# Patient Record
Sex: FEMALE | Race: WHITE | Employment: OTHER | URBAN - METROPOLITAN AREA
[De-identification: names, ages, dates, MRNs, and addresses within clinical notes are randomized per-mention and may not be internally consistent; named-entity substitution may affect disease eponyms.]

---

## 2018-08-01 ENCOUNTER — APPOINTMENT (OUTPATIENT)
Dept: GENERAL RADIOLOGY | Age: 83
End: 2018-08-01
Payer: MEDICARE

## 2018-08-01 ENCOUNTER — HOSPITAL ENCOUNTER (EMERGENCY)
Age: 83
Discharge: HOME | End: 2018-08-01
Payer: MEDICARE

## 2018-08-01 VITALS
RESPIRATION RATE: 14 BRPM | DIASTOLIC BLOOD PRESSURE: 81 MMHG | OXYGEN SATURATION: 97 % | WEIGHT: 220 LBS | HEART RATE: 81 BPM | TEMPERATURE: 98.3 F | SYSTOLIC BLOOD PRESSURE: 174 MMHG | HEIGHT: 62 IN | BODY MASS INDEX: 40.48 KG/M2

## 2018-08-01 DIAGNOSIS — M62.838 SPASM OF MUSCLE: Primary | ICD-10-CM

## 2018-08-01 DIAGNOSIS — M25.512 ARTHRALGIA OF LEFT ACROMIOCLAVICULAR JOINT: ICD-10-CM

## 2018-08-01 PROCEDURE — 73060 X-RAY EXAM OF HUMERUS: CPT

## 2018-08-01 PROCEDURE — 99283 EMERGENCY DEPT VISIT LOW MDM: CPT

## 2018-08-01 RX ORDER — LIDOCAINE 50 MG/G
1 PATCH TOPICAL DAILY
Qty: 30 PATCH | Refills: 0 | Status: SHIPPED | OUTPATIENT
Start: 2018-08-01

## 2018-08-01 ASSESSMENT — PAIN SCALES - GENERAL: PAINLEVEL_OUTOF10: 10

## 2018-08-01 ASSESSMENT — PAIN DESCRIPTION - LOCATION: LOCATION: ARM

## 2018-08-01 ASSESSMENT — PAIN DESCRIPTION - ORIENTATION: ORIENTATION: LEFT

## 2018-08-01 ASSESSMENT — PAIN DESCRIPTION - PAIN TYPE: TYPE: ACUTE PAIN

## 2018-08-01 NOTE — ED PROVIDER NOTES
[facility]      HPI:  8/1/18,   Time: 5:28 PM         Luna Willis is a 82 y.o. female presenting to the ED for  left upper arm pain for 3 days. Patient states that she slept on her arm  underneath of her and woke up with a lump in her upper arm. Patient states that it only hurts when she lays on her arm at night. She denies  any chest pain shortness of breath nausea vomiting jaw pain or neck pain. Patient states that she can feel a tender lump in her arm. The patient denies any direct trauma or injury. He shouldn't denies any numbness tingling or weakness to the bilateral upper extremities. ROS:   Pertinent positives and negatives are stated within HPI, all other systems reviewed and are negative.  --------------------------------------------- PAST HISTORY ---------------------------------------------  Past Medical History:  has a past medical history of Arthritis; CAD (coronary artery disease); Depression; Diabetes mellitus ([facility] [address] H/O cardiovascular stress test; History of kidney stones; Hx of blood clots; Hyperlipidemia; Hypertension; Myocardial infarct; Preoperative clearance; Reflux; Thyroid disease; and Vitamin D deficiency. Past Surgical History:  has a past surgical history that includes Hysterectomy; Parathyroid gland surgery (2720'D); other surgical history (Right, 5/23/2013); Cardiac catheterization (1997 and 1998); Total knee arthroplasty (Right, 2/19/15); and Knee Arthroplasty (Left, 07/19/2016). Social History:  reports that she quit smoking about 44 years ago. She has never used smokeless tobacco. She reports that she does not drink alcohol or use drugs. Family History: family history is not on file. The patients home medications have been reviewed. Allergies: Patient has no known allergies.     -------------------------------------------------- RESULTS -------------------------------------------------  All laboratory and radiology results have been personally reviewed by myself   LABS:  No results found for this visit on 08/01/18. RADIOLOGY:  Interpreted by Radiologist.  XR HUMERUS LEFT (MIN 2 VIEWS)   Final Result   Symptoms should be correlated with clinical findings regarding the   possibility of rotator cuff impingement, given narrowing of the   acromial-humeral interspace. No evidence of fracture or other significant acute pathology. MRI could better characterize rotator cuff anatomy and upper arm soft   tissues at further imaging is clinically warranted. ------------------------- NURSING NOTES AND VITALS REVIEWED ---------------------------   The nursing notes within the ED encounter and vital signs as below have been reviewed. BP (!) 174/81   Pulse 81   Temp 98.3 °F (36.8 °C) (Oral)   Resp 14   Ht 5' 2\" (1.575 m)   Wt 220 lb (99.8 kg)   SpO2 97%   BMI 40.24 kg/m²   Oxygen Saturation Interpretation: Normal      ---------------------------------------------------PHYSICAL EXAM--------------------------------------      Constitutional/General: Alert and oriented x3, well appearing, non toxic in NAD  Head: NC/AT  Eyes: PERRL, EOMI  Mouth: Oropharynx clear, handling secretions, no trismus  Neck: Supple, full ROM, no meningeal signs  Pulmonary: Lungs clear to auscultation bilaterally, no wheezes, rales, or rhonchi. Not in respiratory distress  Cardiovascular:  Regular rate and rhythm, no murmurs, gallops, or rubs. 2+ distal pulses  Abdomen: Soft, non tender, non distended,   Extremities: Moves all extremities x 4. Warm and well perfused. Muscle spasming felt to the left deltoid. Range of motion to the shoulder and elbow without pain. Positive impingement sign. Negative erythema ecchymosis or edema. Brachial radial and ulnar pulses are 2+ capillary refill is brisk. Skin is warm dry and intact.   Skin: warm and dry without rash  Neurologic: GCS 15,  Psych: Normal Affect      ------------------------------ ED COURSE/MEDICAL DECISION

## 2021-01-27 ENCOUNTER — IMMUNIZATION (OUTPATIENT)
Dept: PRIMARY CARE CLINIC | Age: 86
End: 2021-01-27
Payer: MEDICARE

## 2021-01-27 PROCEDURE — 0001A COVID-19, PFIZER VACCINE 30MCG/0.3ML DOSE: CPT | Performed by: PHYSICIAN ASSISTANT

## 2021-01-27 PROCEDURE — 91300 COVID-19, PFIZER VACCINE 30MCG/0.3ML DOSE: CPT | Performed by: PHYSICIAN ASSISTANT

## 2021-02-17 ENCOUNTER — IMMUNIZATION (OUTPATIENT)
Dept: PRIMARY CARE CLINIC | Age: 86
End: 2021-02-17
Payer: MEDICARE

## 2021-02-17 PROCEDURE — 0002A COVID-19, PFIZER VACCINE 30MCG/0.3ML DOSE: CPT | Performed by: NURSE PRACTITIONER

## 2021-02-17 PROCEDURE — 91300 COVID-19, PFIZER VACCINE 30MCG/0.3ML DOSE: CPT | Performed by: NURSE PRACTITIONER

## 2021-05-17 RX ORDER — LEVOTHYROXINE SODIUM 0.12 MG/1
125 TABLET ORAL DAILY
COMMUNITY

## 2021-05-17 RX ORDER — ASPIRIN 325 MG
325 TABLET ORAL DAILY
COMMUNITY

## 2021-05-17 RX ORDER — BLOOD SUGAR DIAGNOSTIC
STRIP MISCELLANEOUS
COMMUNITY
Start: 2021-02-16

## 2021-05-17 RX ORDER — OMEPRAZOLE 20 MG/1
20 CAPSULE, DELAYED RELEASE ORAL DAILY
COMMUNITY

## 2021-05-17 NOTE — PROGRESS NOTES
[facility] PRE-ADMISSION TESTING INSTRUCTIONS    The Preadmission Testing patient is instructed accordingly using the following criteria (check applicable):    ARRIVAL INSTRUCTIONS:  [x] Parking the day of Surgery is located in the Main Entrance lot. Upon entering the door, make an immediate right to the surgery reception desk    [x] Bring photo ID and insurance card    [x] Bring in a copy of Living will or Durable Power of  papers. [x] Please be sure to arrange for responsible adult to provide transportation to and from the hospital    [x] Please arrange for responsible adult to be with you for the 24 hour period post procedure due to having anesthesia      GENERAL INSTRUCTIONS:    [x] Nothing by mouth after midnight, including gum, candy, mints or water    [x] You may brush your teeth, but do not swallow any water    [x] Take medications as instructed with 1-2 oz of water    [x] Stop herbal supplements and vitamins 5 days prior to procedure    [x] Follow preop dosing of blood thinners per physician instructions    [] Take 1/2 dose of evening insulin, but no insulin after midnight    [] No oral diabetic medications after midnight    [] If diabetic and have low blood sugar or feel symptomatic, take 1-2oz apple juice only    [] Bring inhalers day of surgery    [] Bring C-PAP/ Bi-Pap day of surgery    [] Bring urine specimen day of surgery    [x] Shower or bath with soap, lather and rinse well, AM of Surgery, no lotion, powders or creams to surgical site    [] Follow bowel prep as instructed per surgeon    [x] No tobacco products within 24 hours of surgery     [x] No alcohol or illegal drug use within 24 hours of surgery.     [x] Jewelry, body piercing's, eyeglasses, contact lenses and dentures are not permitted into surgery (bring cases)      [x] Please do not wear any nail polish, make up or hair products on the day of surgery    [x] You can expect a call the business day prior to procedure to notify you if your arrival time changes    [x] If you receive a survey after surgery we would greatly appreciate your comments    [] Parent/guardian of a minor must accompany their child and remain on the premises  the entire time they are under our care     [] Pediatric patients may bring favorite toy, blanket or comfort item with them    [] A caregiver or family member must remain with the patient during their stay if they are mentally handicapped, have dementia, disoriented or unable to use a call light or would be a safety concern if left unattended    [x] Please notify surgeon if you develop any illness between now and time of surgery (cold, cough, sore throat, fever, nausea, vomiting) or any signs of infections  including skin, wounds, and dental.    [x]  The Outpatient Pharmacy is available to fill your prescription here on your day of surgery, ask your preop nurse for details    [] Other instructions    EDUCATIONAL MATERIALS PROVIDED:    [] PAT Preoperative Education Packet/Booklet     [] Medication List    [] Transfusion bracelet applied with instructions    [] Shower with soap, lather and rinse well, and use CHG wipes provided the evening before surgery as instructed    [] Incentive spirometer with instructions

## 2021-05-19 ENCOUNTER — PREP FOR PROCEDURE (OUTPATIENT)
Dept: SURGERY | Age: 86
End: 2021-05-19

## 2021-05-20 ENCOUNTER — PREP FOR PROCEDURE (OUTPATIENT)
Dept: SURGERY | Age: 86
End: 2021-05-20

## 2021-05-20 ENCOUNTER — ANESTHESIA EVENT (OUTPATIENT)
Dept: OPERATING ROOM | Age: 86
End: 2021-05-20
Payer: MEDICARE

## 2021-05-20 ENCOUNTER — HOSPITAL ENCOUNTER (OUTPATIENT)
Age: 86
Setting detail: OUTPATIENT SURGERY
Discharge: HOME | End: 2021-05-20
Attending: ORTHOPAEDIC SURGERY | Admitting: ORTHOPAEDIC SURGERY
Payer: MEDICARE

## 2021-05-20 ENCOUNTER — ANESTHESIA (OUTPATIENT)
Dept: OPERATING ROOM | Age: 86
End: 2021-05-20
Payer: MEDICARE

## 2021-05-20 ENCOUNTER — APPOINTMENT (OUTPATIENT)
Dept: GENERAL RADIOLOGY | Age: 86
End: 2021-05-20
Attending: ORTHOPAEDIC SURGERY
Payer: MEDICARE

## 2021-05-20 VITALS
DIASTOLIC BLOOD PRESSURE: 52 MMHG | TEMPERATURE: 96.8 F | HEART RATE: 72 BPM | OXYGEN SATURATION: 96 % | WEIGHT: 210 LBS | SYSTOLIC BLOOD PRESSURE: 118 MMHG | BODY MASS INDEX: 38.64 KG/M2 | RESPIRATION RATE: 18 BRPM | HEIGHT: 62 IN

## 2021-05-20 VITALS — DIASTOLIC BLOOD PRESSURE: 62 MMHG | SYSTOLIC BLOOD PRESSURE: 126 MMHG | OXYGEN SATURATION: 100 %

## 2021-05-20 LAB — METER GLUCOSE: 170 MG/DL (ref 74–99)

## 2021-05-20 PROCEDURE — 3600000002 HC SURGERY LEVEL 2 BASE: Performed by: ORTHOPAEDIC SURGERY

## 2021-05-20 PROCEDURE — 7100000010 HC PHASE II RECOVERY - FIRST 15 MIN: Performed by: ORTHOPAEDIC SURGERY

## 2021-05-20 PROCEDURE — 6360000004 HC RX CONTRAST MEDICATION: Performed by: ORTHOPAEDIC SURGERY

## 2021-05-20 PROCEDURE — 3700000001 HC ADD 15 MINUTES (ANESTHESIA): Performed by: ORTHOPAEDIC SURGERY

## 2021-05-20 PROCEDURE — 82962 GLUCOSE BLOOD TEST: CPT

## 2021-05-20 PROCEDURE — 7100000011 HC PHASE II RECOVERY - ADDTL 15 MIN: Performed by: ORTHOPAEDIC SURGERY

## 2021-05-20 PROCEDURE — 2500000003 HC RX 250 WO HCPCS: Performed by: ORTHOPAEDIC SURGERY

## 2021-05-20 PROCEDURE — 2580000003 HC RX 258: Performed by: NURSE ANESTHETIST, CERTIFIED REGISTERED

## 2021-05-20 PROCEDURE — 3700000000 HC ANESTHESIA ATTENDED CARE: Performed by: ORTHOPAEDIC SURGERY

## 2021-05-20 PROCEDURE — 3600000012 HC SURGERY LEVEL 2 ADDTL 15MIN: Performed by: ORTHOPAEDIC SURGERY

## 2021-05-20 PROCEDURE — 3209999900 FLUORO FOR SURGICAL PROCEDURES

## 2021-05-20 PROCEDURE — 6360000002 HC RX W HCPCS: Performed by: ORTHOPAEDIC SURGERY

## 2021-05-20 PROCEDURE — 6360000002 HC RX W HCPCS: Performed by: NURSE ANESTHETIST, CERTIFIED REGISTERED

## 2021-05-20 PROCEDURE — 2709999900 HC NON-CHARGEABLE SUPPLY: Performed by: ORTHOPAEDIC SURGERY

## 2021-05-20 RX ORDER — SODIUM CHLORIDE 0.9 % (FLUSH) 0.9 %
5-40 SYRINGE (ML) INJECTION PRN
Status: DISCONTINUED | OUTPATIENT
Start: 2021-05-20 | End: 2021-05-20 | Stop reason: HOSPADM

## 2021-05-20 RX ORDER — LIDOCAINE HYDROCHLORIDE 10 MG/ML
INJECTION, SOLUTION EPIDURAL; INFILTRATION; INTRACAUDAL; PERINEURAL PRN
Status: DISCONTINUED | OUTPATIENT
Start: 2021-05-20 | End: 2021-05-20 | Stop reason: ALTCHOICE

## 2021-05-20 RX ORDER — SODIUM CHLORIDE 0.9 % (FLUSH) 0.9 %
5-40 SYRINGE (ML) INJECTION EVERY 12 HOURS SCHEDULED
Status: CANCELLED | OUTPATIENT
Start: 2021-05-20

## 2021-05-20 RX ORDER — SODIUM CHLORIDE 0.9 % (FLUSH) 0.9 %
5-40 SYRINGE (ML) INJECTION EVERY 12 HOURS SCHEDULED
Status: DISCONTINUED | OUTPATIENT
Start: 2021-05-20 | End: 2021-05-20 | Stop reason: HOSPADM

## 2021-05-20 RX ORDER — SODIUM CHLORIDE 9 MG/ML
INJECTION, SOLUTION INTRAVENOUS CONTINUOUS PRN
Status: DISCONTINUED | OUTPATIENT
Start: 2021-05-20 | End: 2021-05-20 | Stop reason: SDUPTHER

## 2021-05-20 RX ORDER — SODIUM CHLORIDE 0.9 % (FLUSH) 0.9 %
5-40 SYRINGE (ML) INJECTION PRN
Status: CANCELLED | OUTPATIENT
Start: 2021-05-20

## 2021-05-20 RX ORDER — SODIUM CHLORIDE 9 MG/ML
25 INJECTION, SOLUTION INTRAVENOUS PRN
Status: DISCONTINUED | OUTPATIENT
Start: 2021-05-20 | End: 2021-05-20 | Stop reason: HOSPADM

## 2021-05-20 RX ORDER — SODIUM CHLORIDE 9 MG/ML
25 INJECTION, SOLUTION INTRAVENOUS PRN
Status: CANCELLED | OUTPATIENT
Start: 2021-05-20

## 2021-05-20 RX ORDER — PROPOFOL 10 MG/ML
INJECTION, EMULSION INTRAVENOUS CONTINUOUS PRN
Status: DISCONTINUED | OUTPATIENT
Start: 2021-05-20 | End: 2021-05-20 | Stop reason: SDUPTHER

## 2021-05-20 RX ADMIN — PROPOFOL 100 MCG/KG/MIN: 10 INJECTION, EMULSION INTRAVENOUS at 08:05

## 2021-05-20 RX ADMIN — SODIUM CHLORIDE: 9 INJECTION, SOLUTION INTRAVENOUS at 08:01

## 2021-05-20 ASSESSMENT — PAIN DESCRIPTION - DESCRIPTORS: DESCRIPTORS: ACHING

## 2021-05-20 ASSESSMENT — PAIN - FUNCTIONAL ASSESSMENT: PAIN_FUNCTIONAL_ASSESSMENT: 0-10

## 2021-05-20 ASSESSMENT — PULMONARY FUNCTION TESTS
PIF_VALUE: 0

## 2021-05-20 ASSESSMENT — PAIN SCALES - GENERAL: PAINLEVEL_OUTOF10: 0

## 2021-05-20 NOTE — OP NOTE
[address] [address]                                OPERATIVE REPORT    PATIENT NAME: Lelo Trejo                  :        1935  MED REC NO:   [id number]                            ROOM:  ACCOUNT NO:   [de-identified]                           ADMIT DATE: 2021  PROVIDER:     Walter Sherwood MD    DATE OF PROCEDURE:  2021    PREOPERATIVE DIAGNOSES:  Left hip pain with osteoarthritis. POSTOPERATIVE DIAGNOSES:  Left hip pain with osteoarthritis. PROCEDURES PERFORMED:  Left hip intra-articular steroid injection with  arthrogram using fluoroscopic guidance for needle placement. SURGEON:  PASCUAL [place]:  None. ANESTHESIA:  MAC.    ESTIMATED BLOOD LOSS:  Zero. COMPLICATIONS:  None. CONDITION:  Stable to Recovery. INDICATIONS:  The patient is an 45-year-old woman, who has developed  pain in the left hip and thigh with radiation to the knee. Radiographs  consistent with degenerative joint disease of the left hip. Intra-articular steroid injection was offered for both diagnostic and  therapeutic purpose following explanation of the risks, benefits,  alternatives, and limitations of the procedure. Informed consent  obtained. DESCRIPTION OF PROCEDURE:  The patient met in the preoperative holding  area. Left hip was marked as the correct operative site, taken to the  operating room and placed supine. Intravenous sedation was administered  by Anesthesia Department. The left distal flank, groin, and thigh were  prepped with ChloraPrep. Sterile field was demarcated with towels. Following a surgical timeout, C-arm fluoroscopy identified the left hip  joint, which showed evidence of osteoarthritis, but no bone-on-bone  contact. The skin was infiltrated with 5 mL of 1% lidocaine.   I  advanced a 3.5-inch 22-gauge spinal needle down to the femoral neck and  1 mL of contrast was delivered for the arthrogram, confirming  intra-articular placement of the needle and the hip was then injected  with 80 mg of Kenalog plus Marcaine and lidocaine. The needle was  withdrawn. There was no bleeding. Puncture site was covered with a  Band-Aid and sedation reversed. The patient tolerated the procedure  well. She was transported from operating table onto her hospital bed  and taken to Recovery in stable condition.         Clare Perkins MD    D: 05/20/2021 8:31:41       T: 05/20/2021 8:40:17     [email or web address]  Job#: [id number]     Doc#: [id number]    CC:

## 2021-05-20 NOTE — BRIEF OP NOTE
Brief Postoperative Note      Patient: Boris Almazan  YOB: 1935  MRN: [id number]    Date of Procedure: 5/20/2021    Pre-Op Diagnosis: Left hip pain with osteoarthritis    Post-Op Diagnosis: Same       Procedure(s):  LEFT HIP INJECTION ARTHROGRAM    Surgeon(s):  Oscar Garcia MD    Assistant:  * No surgical staff found *    Anesthesia: Monitor Anesthesia Care    Estimated Blood Loss (mL): 0    Complications: None    Specimens:   * No specimens in log *    Implants:  * No implants in log *      Drains: * No LDAs found *    Findings:  Moderate DJD    Electronically signed by Oscar Garcia MD on 5/20/2021 at 8:24 AM

## 2021-05-20 NOTE — ANESTHESIA PRE PROCEDURE
Department of Anesthesiology  Preprocedure Note       Name:  Doc Sheridan   Age:  82 y.o.  :  1935                                          MRN:  [id number]         Date:  2021      Surgeon: Chrissy Gould):  Jimbo Thomas MD    Procedure: Procedure(s):  LEFT HIP INJECTION ARTHROGRAM    Medications prior to admission:   Prior to Admission medications    Medication Sig Start Date End Date Taking? Authorizing Provider   levothyroxine (SYNTHROID) 125 MCG tablet Take 125 mcg by mouth Daily   Yes Historical Provider, MD   aspirin 325 MG tablet Take 325 mg by mouth daily Last taken 05/15/21   Yes Historical Provider, MD   omeprazole (PRILOSEC) 20 MG delayed release capsule Take 20 mg by mouth daily Take am dos 2021   Yes Historical Provider, MD   lidocaine (LIDODERM) 5 % Place 1 patch onto the skin daily 12 hours on, 12 hours off. 18  Yes ION Baldwin CNP   cholestyramine light 4 G packet Take 4 g by mouth daily    Yes Historical Provider, MD   isosorbide mononitrate (IMDUR) 30 MG CR tablet Take 30 mg by mouth daily Instructed to take with sip water am of procedure, 2021   Yes Historical Provider, MD   buPROPion SR (WELLBUTRIN SR) 150 MG SR tablet Take 150 mg by mouth 2 times daily Instructed to take with sip water am of procedure, 21   Yes Historical Provider, MD   lisinopril (PRINIVIL;ZESTRIL) 10 MG tablet Take 10 mg by mouth daily. Yes Historical Provider, MD   Metoprolol Tartrate (LOPRESSOR PO) Take 50 mg by mouth 2 times daily Instructed to take with sip water am of procedure. 2021   Yes Historical Provider, MD   Potassium Citrate (UROCIT-K 10 PO) Take 2 tablets by mouth 3 times daily    Yes Historical Provider, MD   allopurinol (ZYLOPRIM) 300 MG tablet Take 300 mg by mouth daily.  Taking for kidney stones   Yes Historical Provider, MD   Cholecalciferol (VITAMIN D3) 2000 UNITS CAPS Take 2,000 Units by mouth daily Last dose 21   Yes Historical Provider, MD multivitamin-iron-minerals-folic acid (CENTRUM) chewable tablet Take 1 tablet by mouth daily Last dose 05/17/21   Yes Historical Provider, MD   [facility]  2/16/21   Historical Provider, MD       Current medications:    No current facility-administered medications for this encounter.        Allergies:  No Known Allergies    Problem List:    Patient Active Problem List   Diagnosis Code    Primary osteoarthritis of right hip M16.11    Gout M10.9    Depression F32.9    Hyperlipidemia E78.5    HBP (high blood pressure) I10    Hypothyroid E03.9    H/O total hip arthroplasty (11-21-13: [facility])(Esequiel Lomeli MD) F58.301    CAD (coronary artery disease) I25.10    Primary osteoarthritis of right knee M17.11    S/P R total knee arthroplasty (2-19-15: Dr. Constantino Braswell) G02.074    Primary osteoarthritis of left knee M17.12    S/P L total knee arthroplasty (7-19-16: Arvind Alcocer M.D.) I69.575    Diabetes mellitus [facility]) E11.9       Past Medical History:        Diagnosis Date    Arthritis     osteo    CAD (coronary artery disease) 1997    Depression     Diabetes mellitus ([facility] [address]     borderline     H/O cardiovascular stress test 06/29/2016    report on chart    Heartburn     History of kidney stones     Hx of blood clots 1973    h/o    Hyperlipidemia     Hypertension     Left hip pain 05/2021    Myocardial infarct [facility]) 1997    3 mild episodes    Preoperative clearance 07/06/2016    Medical clearance from Dr. Brigido Whyte on paper chart (for OR 7-19-16, left total knee)    Thyroid disease     Use of cane as ambulatory aid     Vitamin D deficiency        Past Surgical History:        Procedure Laterality Date    CARDIAC CATHETERIZATION  1997 and [address] Bilateral     HYSTERECTOMY      JOINT REPLACEMENT      KNEE ARTHROPLASTY Left 07/19/2016    OTHER SURGICAL HISTORY Right 5/23/2013    hip arthrogram    PARATHYROID GLAND SURGERY  1990's    TOTAL KNEE ARTHROPLASTY Right 2/19/15    RIGHT TOTAL KNEE ARTHROPLASTY        Social History:    Social History     Tobacco Use    Smoking status: Former Smoker     Quit date: 1974     Years since quittin.0    Smokeless tobacco: Never Used   Substance Use Topics    Alcohol use: Yes     Comment: very rare                                Counseling given: Not Answered      Vital Signs (Current):   Vitals:    21 1421 21 0620   BP:  (!) 160/71   Pulse:  74   Resp:  18   Temp:  97 °F (36.1 °C)   TempSrc:  Temporal   SpO2:  95%   Weight: 210 lb (95.3 kg) 210 lb (95.3 kg)   Height: 5' 2\" (1.575 m) 5' 2\" (1.575 m)                                              BP Readings from Last 3 Encounters:   21 (!) 160/71   18 (!) 174/81   16 93/50       NPO Status: Time of last liquid consumption: 2200                        Time of last solid consumption: 1800                        Date of last liquid consumption: 21                        Date of last solid food consumption: 21    BMI:   Wt Readings from Last 3 Encounters:   21 210 lb (95.3 kg)   18 220 lb (99.8 kg)   16 223 lb (101.2 kg)     Body mass index is 38.41 kg/m². CBC:   Lab Results   Component Value Date    WBC 6.8 2016    RBC 4.11 2016    HGB 11.3 2016    HCT 34.6 2016    MCV 96.1 2016    RDW 15.0 2016     2016       CMP:   Lab Results   Component Value Date     2016    K 5.2 2016     2016    CO2 21 2016    BUN 27 2016    CREATININE 1.0 2016    GFRAA >60 2016    LABGLOM 53 2016    GLUCOSE 173 2016    GLUCOSE 122 2012    PROT 6.7 11/15/2013    CALCIUM 8.7 2016    BILITOT 0.7 11/15/2013    ALKPHOS 63 11/15/2013    AST 20 11/15/2013    ALT 22 11/15/2013       POC Tests: No results for input(s): POCGLU, POCNA, POCK, POCCL, POCBUN, POCHEMO, POCHCT in the last 72 hours.     Coags:   Lab Results   Component Value Date    PROTIME 17.4 11/24/2013    INR 2.1 11/24/2013       HCG (If Applicable): No results found for: PREGTESTUR, PREGSERUM, HCG, HCGQUANT     ABGs: No results found for: PHART, PO2ART, PNB3ULW, LHX4SJZ, BEART, E0JRZTRJ     Type & Screen (If Applicable):  No results found for: LABABO, LABRH    Drug/Infectious Status (If Applicable):  No results found for: HIV, HEPCAB    COVID-19 Screening (If Applicable): No results found for: COVID19        Anesthesia Evaluation  Patient summary reviewed and Nursing notes reviewed no history of anesthetic complications:   Airway: Mallampati: III  TM distance: >3 FB   Neck ROM: limited  Mouth opening: > = 3 FB Dental: normal exam         Pulmonary:Negative Pulmonary ROS breath sounds clear to auscultation                             Cardiovascular:  Exercise tolerance: good (>4 METS),   (+) hypertension:, past MI: > 6 months, CAD:, hyperlipidemia        Rhythm: regular  Rate: normal                    Neuro/Psych:   (+) psychiatric history:            GI/Hepatic/Renal: Neg GI/Hepatic/Renal ROS            Endo/Other:    (+) DiabetesType II DM, no interval change, , hypothyroidism::., .                 Abdominal:           Vascular: negative vascular ROS. Anesthesia Plan      MAC     ASA 3       Induction: intravenous. Anesthetic plan and risks discussed with patient.       Plan discussed with CRNA and surgical team.                  Fuentes Cleveland MD   5/20/2021

## 2021-08-04 ENCOUNTER — PREP FOR PROCEDURE (OUTPATIENT)
Dept: SURGERY | Age: 86
End: 2021-08-04

## 2021-08-04 DIAGNOSIS — M16.12 PRIMARY OSTEOARTHRITIS OF LEFT HIP: Primary | ICD-10-CM

## 2021-08-04 RX ORDER — ACETAMINOPHEN 325 MG/1
1000 TABLET ORAL ONCE
Status: CANCELLED | OUTPATIENT
Start: 2021-08-04 | End: 2021-08-04

## 2021-08-04 RX ORDER — SODIUM CHLORIDE 0.9 % (FLUSH) 0.9 %
5-40 SYRINGE (ML) INJECTION EVERY 12 HOURS SCHEDULED
Status: CANCELLED | OUTPATIENT
Start: 2021-08-04

## 2021-08-04 RX ORDER — SODIUM CHLORIDE 9 MG/ML
25 INJECTION, SOLUTION INTRAVENOUS PRN
Status: CANCELLED | OUTPATIENT
Start: 2021-08-04

## 2021-08-04 RX ORDER — SODIUM CHLORIDE 0.9 % (FLUSH) 0.9 %
5-40 SYRINGE (ML) INJECTION PRN
Status: CANCELLED | OUTPATIENT
Start: 2021-08-04

## 2021-08-04 RX ORDER — SODIUM CHLORIDE, SODIUM LACTATE, POTASSIUM CHLORIDE, CALCIUM CHLORIDE 600; 310; 30; 20 MG/100ML; MG/100ML; MG/100ML; MG/100ML
INJECTION, SOLUTION INTRAVENOUS CONTINUOUS
Status: CANCELLED | OUTPATIENT
Start: 2021-08-04

## 2021-08-04 RX ORDER — CELECOXIB 100 MG/1
100 CAPSULE ORAL ONCE
Status: CANCELLED | OUTPATIENT
Start: 2021-08-04 | End: 2021-08-04

## 2021-08-13 ENCOUNTER — HOSPITAL ENCOUNTER (OUTPATIENT)
Dept: PREADMISSION TESTING | Age: 86
Discharge: HOME | End: 2021-08-13
Payer: MEDICARE

## 2021-08-13 VITALS
HEART RATE: 77 BPM | SYSTOLIC BLOOD PRESSURE: 109 MMHG | WEIGHT: 210 LBS | HEIGHT: 62 IN | OXYGEN SATURATION: 96 % | BODY MASS INDEX: 38.64 KG/M2 | DIASTOLIC BLOOD PRESSURE: 69 MMHG | RESPIRATION RATE: 18 BRPM | TEMPERATURE: 97.7 F

## 2021-08-13 DIAGNOSIS — M16.12 PRIMARY OSTEOARTHRITIS OF LEFT HIP: ICD-10-CM

## 2021-08-13 LAB
ANION GAP SERPL CALCULATED.3IONS-SCNC: 9 MMOL/L (ref 7–16)
BACTERIA: ABNORMAL /HPF
BILIRUBIN URINE: NEGATIVE
BLOOD, URINE: ABNORMAL
BUN BLDV-MCNC: 27 MG/DL (ref 6–23)
CALCIUM SERPL-MCNC: 9.6 MG/DL (ref 8.6–10.2)
CHLORIDE BLD-SCNC: 103 MMOL/L (ref 98–107)
CLARITY: ABNORMAL
CO2: 26 MMOL/L (ref 22–29)
COLOR: YELLOW
CREAT SERPL-MCNC: 1.1 MG/DL (ref 0.5–1)
EKG ATRIAL RATE: 74 BPM
EKG P AXIS: 18 DEGREES
EKG P-R INTERVAL: 152 MS
EKG Q-T INTERVAL: 406 MS
EKG QRS DURATION: 84 MS
EKG QTC CALCULATION (BAZETT): 450 MS
EKG R AXIS: -24 DEGREES
EKG VENTRICULAR RATE: 74 BPM
GFR AFRICAN AMERICAN: 57
GFR NON-AFRICAN AMERICAN: 47 ML/MIN/1.73
GLUCOSE BLD-MCNC: 212 MG/DL (ref 74–99)
GLUCOSE URINE: NEGATIVE MG/DL
HCT VFR BLD CALC: 42.5 % (ref 34–48)
HEMOGLOBIN: 13.3 G/DL (ref 11.5–15.5)
KETONES, URINE: NEGATIVE MG/DL
LEUKOCYTE ESTERASE, URINE: ABNORMAL
MCH RBC QN AUTO: 31.9 PG (ref 26–35)
MCHC RBC AUTO-ENTMCNC: 31.3 % (ref 32–34.5)
MCV RBC AUTO: 101.9 FL (ref 80–99.9)
NITRITE, URINE: POSITIVE
PDW BLD-RTO: 14.3 FL (ref 11.5–15)
PH UA: 6 (ref 5–9)
PLATELET # BLD: 272 E9/L (ref 130–450)
PMV BLD AUTO: 9.8 FL (ref 7–12)
POTASSIUM SERPL-SCNC: 4.4 MMOL/L (ref 3.5–5)
PROTEIN UA: NEGATIVE MG/DL
RBC # BLD: 4.17 E12/L (ref 3.5–5.5)
RBC UA: ABNORMAL /HPF (ref 0–2)
SODIUM BLD-SCNC: 138 MMOL/L (ref 132–146)
SPECIFIC GRAVITY UA: 1.02 (ref 1–1.03)
UROBILINOGEN, URINE: 0.2 E.U./DL
WBC # BLD: 7.1 E9/L (ref 4.5–11.5)
WBC UA: ABNORMAL /HPF (ref 0–5)

## 2021-08-13 PROCEDURE — 80048 BASIC METABOLIC PNL TOTAL CA: CPT

## 2021-08-13 PROCEDURE — 87081 CULTURE SCREEN ONLY: CPT

## 2021-08-13 PROCEDURE — 81001 URINALYSIS AUTO W/SCOPE: CPT

## 2021-08-13 PROCEDURE — 85027 COMPLETE CBC AUTOMATED: CPT

## 2021-08-13 PROCEDURE — 93010 ELECTROCARDIOGRAM REPORT: CPT | Performed by: INTERNAL MEDICINE

## 2021-08-13 PROCEDURE — 36415 COLL VENOUS BLD VENIPUNCTURE: CPT

## 2021-08-13 PROCEDURE — 93005 ELECTROCARDIOGRAM TRACING: CPT

## 2021-08-13 ASSESSMENT — HOOS JR
LYING IN BED (TURNING OVER, MAINTAINING HIP POSITION): 3
WALKING ON UNEVEN SURFACE: 3
SITTING: 2
BENDING TO THE FLOOR TO PICK UP OBJECT: 2
HOOS JR RAW SCORE: 17
GOING UP OR DOWN STAIRS: 4
RISING FROM SITTING: 3

## 2021-08-13 NOTE — PROGRESS NOTES
[facility] PRE-ADMISSION TESTING INSTRUCTIONS  Surgery date 8-19-21   Arrival time 5:45 a.m. The Preadmission Testing patient is instructed accordingly using the following criteria (check applicable):    ARRIVAL INSTRUCTIONS:  [x] Parking the day of Surgery is located in the Main Entrance lot. Upon entering the door, make an immediate right to the surgery reception desk    [x] Bring photo ID and insurance card    [x] Bring in a copy of Living will or Durable Power of  papers. [] Please be sure to arrange for responsible adult to provide transportation to and from the hospital    [] Please arrange for responsible adult to be with you for the 24 hour period post procedure due to having anesthesia      GENERAL INSTRUCTIONS:    [x] Nothing by mouth after midnight, including gum, candy, mints or water    [x] You may brush your teeth, but do not swallow any water    [x] Take medications as instructed with 1-2 oz of water    [x] Stop herbal supplements and vitamins 5 days prior to procedure    [x] Follow preop dosing of blood thinners per physician instructions    [] Take 1/2 dose of evening insulin, but no insulin after midnight    [] No oral diabetic medications after midnight    [] If diabetic and have low blood sugar or feel symptomatic, take 1-2oz apple juice only    [] Bring inhalers day of surgery    [] Bring C-PAP/ Bi-Pap day of surgery    [] Bring urine specimen day of surgery    [x] Shower or bath with soap, lather and rinse well, AM of Surgery, no lotion, powders or creams to surgical site    [] Follow bowel prep as instructed per surgeon    [x] No tobacco products within 24 hours of surgery     [x] No alcohol or illegal drug use within 24 hours of surgery.     [x] Jewelry, body piercing's, eyeglasses, contact lenses and dentures are not permitted into surgery (bring cases)      [x] Please do not wear any nail polish, make up or hair products on the day of surgery    [x] You can expect a call the business day prior to procedure to notify you if your arrival time changes    [x] If you receive a survey after surgery we would greatly appreciate your comments    [] Parent/guardian of a minor must accompany their child and remain on the premises  the entire time they are under our care     [] Pediatric patients may bring favorite toy, blanket or comfort item with them    [] A caregiver or family member must remain with the patient during their stay if they are mentally handicapped, have dementia, disoriented or unable to use a call light or would be a safety concern if left unattended    [x] Please notify surgeon if you develop any illness between now and time of surgery (cold, cough, sore throat, fever, nausea, vomiting) or any signs of infections  including skin, wounds, and dental.    [x]  The Outpatient Pharmacy is available to fill your prescription here on your day of surgery, ask your preop nurse for details    [x] Other instructions: Wear comfortable clothing    EDUCATIONAL MATERIALS PROVIDED:    [x] PAT Preoperative Education Packet/Booklet     [x] Medication List    [] Transfusion bracelet applied with instructions    [x] Shower with soap, lather and rinse well, and use CHG wipes provided the evening before surgery as instructed    [x] Incentive spirometer with instructions        Have you been tested for COVID  No (vaccinated)          Have you been told you were positive for COVID No  Have you had any known exposure to someone that is positive for COVID No  Do you have a cough                   No              Do you have shortness of breath No                 Do you have a sore throat            No                Are you having chills                    No                Are you having muscle aches. No                    Please come to the hospital wearing a mask and have your significant other wear a mask as well.   Both of you should check your temperature before leaving to come here,  if it is 100 or higher please call [phone] for instruction.

## 2021-08-14 LAB — MRSA CULTURE ONLY: NORMAL

## 2021-08-19 ENCOUNTER — ANESTHESIA (OUTPATIENT)
Dept: OPERATING ROOM | Age: 86
DRG: 470 | End: 2021-08-19
Payer: MEDICARE

## 2021-08-19 ENCOUNTER — ANESTHESIA EVENT (OUTPATIENT)
Dept: OPERATING ROOM | Age: 86
DRG: 470 | End: 2021-08-19
Payer: MEDICARE

## 2021-08-19 ENCOUNTER — APPOINTMENT (OUTPATIENT)
Dept: GENERAL RADIOLOGY | Age: 86
DRG: 470 | End: 2021-08-19
Attending: ORTHOPAEDIC SURGERY
Payer: MEDICARE

## 2021-08-19 ENCOUNTER — HOSPITAL ENCOUNTER (INPATIENT)
Age: 86
LOS: 1 days | Discharge: NURSING, REHAB OR LONG-TERM CARE | DRG: 470 | End: 2021-08-20
Attending: ORTHOPAEDIC SURGERY | Admitting: ORTHOPAEDIC SURGERY
Payer: MEDICARE

## 2021-08-19 VITALS — DIASTOLIC BLOOD PRESSURE: 58 MMHG | OXYGEN SATURATION: 98 % | SYSTOLIC BLOOD PRESSURE: 117 MMHG

## 2021-08-19 DIAGNOSIS — M16.12 PRIMARY OSTEOARTHRITIS OF LEFT HIP: Primary | ICD-10-CM

## 2021-08-19 LAB
METER GLUCOSE: 138 MG/DL (ref 74–99)
METER GLUCOSE: 138 MG/DL (ref 74–99)
METER GLUCOSE: 139 MG/DL (ref 74–99)

## 2021-08-19 PROCEDURE — 3700000001 HC ADD 15 MINUTES (ANESTHESIA): Performed by: ORTHOPAEDIC SURGERY

## 2021-08-19 PROCEDURE — 7100000001 HC PACU RECOVERY - ADDTL 15 MIN: Performed by: ORTHOPAEDIC SURGERY

## 2021-08-19 PROCEDURE — G0378 HOSPITAL OBSERVATION PER HR: HCPCS

## 2021-08-19 PROCEDURE — 3600000005 HC SURGERY LEVEL 5 BASE: Performed by: ORTHOPAEDIC SURGERY

## 2021-08-19 PROCEDURE — 6370000000 HC RX 637 (ALT 250 FOR IP): Performed by: PHYSICIAN ASSISTANT

## 2021-08-19 PROCEDURE — 1200000000 HC SEMI PRIVATE

## 2021-08-19 PROCEDURE — 2500000003 HC RX 250 WO HCPCS: Performed by: ORTHOPAEDIC SURGERY

## 2021-08-19 PROCEDURE — 2580000003 HC RX 258: Performed by: PHYSICIAN ASSISTANT

## 2021-08-19 PROCEDURE — 2580000003 HC RX 258

## 2021-08-19 PROCEDURE — C1776 JOINT DEVICE (IMPLANTABLE): HCPCS | Performed by: ORTHOPAEDIC SURGERY

## 2021-08-19 PROCEDURE — L8690 AUD OSSEO DEV, INT/EXT COMP: HCPCS | Performed by: ORTHOPAEDIC SURGERY

## 2021-08-19 PROCEDURE — 73501 X-RAY EXAM HIP UNI 1 VIEW: CPT

## 2021-08-19 PROCEDURE — 3700000000 HC ANESTHESIA ATTENDED CARE: Performed by: ORTHOPAEDIC SURGERY

## 2021-08-19 PROCEDURE — 88311 DECALCIFY TISSUE: CPT

## 2021-08-19 PROCEDURE — 6370000000 HC RX 637 (ALT 250 FOR IP): Performed by: ORTHOPAEDIC SURGERY

## 2021-08-19 PROCEDURE — 6360000002 HC RX W HCPCS: Performed by: ANESTHESIOLOGY

## 2021-08-19 PROCEDURE — 0SRB0J9 REPLACEMENT OF LEFT HIP JOINT WITH SYNTHETIC SUBSTITUTE, CEMENTED, OPEN APPROACH: ICD-10-PCS | Performed by: ORTHOPAEDIC SURGERY

## 2021-08-19 PROCEDURE — 2709999900 HC NON-CHARGEABLE SUPPLY: Performed by: ORTHOPAEDIC SURGERY

## 2021-08-19 PROCEDURE — 2720000010 HC SURG SUPPLY STERILE: Performed by: ORTHOPAEDIC SURGERY

## 2021-08-19 PROCEDURE — 7100000000 HC PACU RECOVERY - FIRST 15 MIN: Performed by: ORTHOPAEDIC SURGERY

## 2021-08-19 PROCEDURE — 3600000015 HC SURGERY LEVEL 5 ADDTL 15MIN: Performed by: ORTHOPAEDIC SURGERY

## 2021-08-19 PROCEDURE — 6360000002 HC RX W HCPCS: Performed by: PHYSICIAN ASSISTANT

## 2021-08-19 PROCEDURE — 97161 PT EVAL LOW COMPLEX 20 MIN: CPT

## 2021-08-19 PROCEDURE — 6360000002 HC RX W HCPCS

## 2021-08-19 PROCEDURE — 2580000003 HC RX 258: Performed by: ORTHOPAEDIC SURGERY

## 2021-08-19 PROCEDURE — 2500000003 HC RX 250 WO HCPCS

## 2021-08-19 PROCEDURE — 6360000002 HC RX W HCPCS: Performed by: ORTHOPAEDIC SURGERY

## 2021-08-19 PROCEDURE — 88304 TISSUE EXAM BY PATHOLOGIST: CPT

## 2021-08-19 PROCEDURE — 2500000003 HC RX 250 WO HCPCS: Performed by: PHYSICIAN ASSISTANT

## 2021-08-19 PROCEDURE — 97165 OT EVAL LOW COMPLEX 30 MIN: CPT

## 2021-08-19 PROCEDURE — 82962 GLUCOSE BLOOD TEST: CPT

## 2021-08-19 PROCEDURE — C1713 ANCHOR/SCREW BN/BN,TIS/BN: HCPCS | Performed by: ORTHOPAEDIC SURGERY

## 2021-08-19 DEVICE — RESTRICTOR CEM M DIA24MM UNIV POLYMER IM INSRT DISP: Type: IMPLANTABLE DEVICE | Site: HIP | Status: FUNCTIONAL

## 2021-08-19 DEVICE — VERSYS DISTAL CENTRALIZER 12MM: Type: IMPLANTABLE DEVICE | Site: HIP | Status: FUNCTIONAL

## 2021-08-19 DEVICE — CEMENT BNE 20ML 41GM FULL DOSE PMMA W/ TOBRA M VISC RADPQ: Type: IMPLANTABLE DEVICE | Site: HIP | Status: FUNCTIONAL

## 2021-08-19 DEVICE — IMPLANTABLE DEVICE: Type: IMPLANTABLE DEVICE | Site: HIP | Status: FUNCTIONAL

## 2021-08-19 DEVICE — IMPL SHELL ACET 3 HL 50MM G7: Type: IMPLANTABLE DEVICE | Site: HIP | Status: FUNCTIONAL

## 2021-08-19 DEVICE — LINER ACET 36 MM HIP: Type: IMPLANTABLE DEVICE | Site: HIP | Status: FUNCTIONAL

## 2021-08-19 RX ORDER — DEXAMETHASONE SODIUM PHOSPHATE 10 MG/ML
8 INJECTION, SOLUTION INTRAMUSCULAR; INTRAVENOUS ONCE
Status: DISCONTINUED | OUTPATIENT
Start: 2021-08-19 | End: 2021-08-19 | Stop reason: HOSPADM

## 2021-08-19 RX ORDER — SODIUM CHLORIDE 0.9 % (FLUSH) 0.9 %
5-40 SYRINGE (ML) INJECTION PRN
Status: DISCONTINUED | OUTPATIENT
Start: 2021-08-19 | End: 2021-08-19 | Stop reason: HOSPADM

## 2021-08-19 RX ORDER — SODIUM CHLORIDE, SODIUM LACTATE, POTASSIUM CHLORIDE, CALCIUM CHLORIDE 600; 310; 30; 20 MG/100ML; MG/100ML; MG/100ML; MG/100ML
INJECTION, SOLUTION INTRAVENOUS CONTINUOUS PRN
Status: DISCONTINUED | OUTPATIENT
Start: 2021-08-19 | End: 2021-08-19 | Stop reason: SDUPTHER

## 2021-08-19 RX ORDER — SODIUM CHLORIDE 0.9 % (FLUSH) 0.9 %
5-40 SYRINGE (ML) INJECTION PRN
Status: DISCONTINUED | OUTPATIENT
Start: 2021-08-19 | End: 2021-08-20 | Stop reason: HOSPADM

## 2021-08-19 RX ORDER — CELECOXIB 100 MG/1
100 CAPSULE ORAL ONCE
Status: COMPLETED | OUTPATIENT
Start: 2021-08-19 | End: 2021-08-19

## 2021-08-19 RX ORDER — LISINOPRIL 10 MG/1
10 TABLET ORAL DAILY
Status: DISCONTINUED | OUTPATIENT
Start: 2021-08-19 | End: 2021-08-20 | Stop reason: HOSPADM

## 2021-08-19 RX ORDER — SODIUM CHLORIDE 9 MG/ML
INJECTION, SOLUTION INTRAVENOUS CONTINUOUS
Status: DISCONTINUED | OUTPATIENT
Start: 2021-08-19 | End: 2021-08-20 | Stop reason: HOSPADM

## 2021-08-19 RX ORDER — SODIUM CHLORIDE 9 MG/ML
25 INJECTION, SOLUTION INTRAVENOUS PRN
Status: DISCONTINUED | OUTPATIENT
Start: 2021-08-19 | End: 2021-08-19 | Stop reason: HOSPADM

## 2021-08-19 RX ORDER — ISOSORBIDE MONONITRATE 30 MG/1
30 TABLET, EXTENDED RELEASE ORAL DAILY
Status: DISCONTINUED | OUTPATIENT
Start: 2021-08-20 | End: 2021-08-20 | Stop reason: HOSPADM

## 2021-08-19 RX ORDER — SODIUM CHLORIDE 9 MG/ML
25 INJECTION, SOLUTION INTRAVENOUS PRN
Status: DISCONTINUED | OUTPATIENT
Start: 2021-08-19 | End: 2021-08-20 | Stop reason: HOSPADM

## 2021-08-19 RX ORDER — OXYCODONE HYDROCHLORIDE 5 MG/1
5 TABLET ORAL EVERY 4 HOURS PRN
Status: DISCONTINUED | OUTPATIENT
Start: 2021-08-19 | End: 2021-08-20 | Stop reason: HOSPADM

## 2021-08-19 RX ORDER — FENTANYL CITRATE 50 UG/ML
50 INJECTION, SOLUTION INTRAMUSCULAR; INTRAVENOUS EVERY 5 MIN PRN
Status: DISCONTINUED | OUTPATIENT
Start: 2021-08-19 | End: 2021-08-19 | Stop reason: HOSPADM

## 2021-08-19 RX ORDER — SODIUM CHLORIDE, SODIUM LACTATE, POTASSIUM CHLORIDE, CALCIUM CHLORIDE 600; 310; 30; 20 MG/100ML; MG/100ML; MG/100ML; MG/100ML
INJECTION, SOLUTION INTRAVENOUS CONTINUOUS
Status: DISCONTINUED | OUTPATIENT
Start: 2021-08-19 | End: 2021-08-19

## 2021-08-19 RX ORDER — ONDANSETRON 2 MG/ML
4 INJECTION INTRAMUSCULAR; INTRAVENOUS EVERY 6 HOURS PRN
Status: DISCONTINUED | OUTPATIENT
Start: 2021-08-19 | End: 2021-08-20 | Stop reason: HOSPADM

## 2021-08-19 RX ORDER — MORPHINE SULFATE 2 MG/ML
1 INJECTION, SOLUTION INTRAMUSCULAR; INTRAVENOUS EVERY 4 HOURS PRN
Status: DISCONTINUED | OUTPATIENT
Start: 2021-08-19 | End: 2021-08-20 | Stop reason: HOSPADM

## 2021-08-19 RX ORDER — MULTIVITAMIN WITH IRON
1 TABLET ORAL DAILY
Status: DISCONTINUED | OUTPATIENT
Start: 2021-08-19 | End: 2021-08-20 | Stop reason: HOSPADM

## 2021-08-19 RX ORDER — LEVOTHYROXINE SODIUM 0.12 MG/1
125 TABLET ORAL DAILY
Status: DISCONTINUED | OUTPATIENT
Start: 2021-08-19 | End: 2021-08-20 | Stop reason: HOSPADM

## 2021-08-19 RX ORDER — SENNA AND DOCUSATE SODIUM 50; 8.6 MG/1; MG/1
1 TABLET, FILM COATED ORAL 2 TIMES DAILY
Status: DISCONTINUED | OUTPATIENT
Start: 2021-08-19 | End: 2021-08-20 | Stop reason: HOSPADM

## 2021-08-19 RX ORDER — SODIUM CHLORIDE 0.9 % (FLUSH) 0.9 %
5-40 SYRINGE (ML) INJECTION EVERY 12 HOURS SCHEDULED
Status: DISCONTINUED | OUTPATIENT
Start: 2021-08-19 | End: 2021-08-19 | Stop reason: HOSPADM

## 2021-08-19 RX ORDER — PROPOFOL 10 MG/ML
INJECTION, EMULSION INTRAVENOUS PRN
Status: DISCONTINUED | OUTPATIENT
Start: 2021-08-19 | End: 2021-08-19 | Stop reason: SDUPTHER

## 2021-08-19 RX ORDER — SODIUM CHLORIDE 0.9 % (FLUSH) 0.9 %
5-40 SYRINGE (ML) INJECTION EVERY 12 HOURS SCHEDULED
Status: DISCONTINUED | OUTPATIENT
Start: 2021-08-19 | End: 2021-08-20 | Stop reason: HOSPADM

## 2021-08-19 RX ORDER — METOPROLOL TARTRATE 50 MG/1
50 TABLET, FILM COATED ORAL 2 TIMES DAILY
Status: DISCONTINUED | OUTPATIENT
Start: 2021-08-19 | End: 2021-08-20 | Stop reason: HOSPADM

## 2021-08-19 RX ORDER — MEPERIDINE HYDROCHLORIDE 25 MG/ML
12.5 INJECTION INTRAMUSCULAR; INTRAVENOUS; SUBCUTANEOUS EVERY 5 MIN PRN
Status: DISCONTINUED | OUTPATIENT
Start: 2021-08-19 | End: 2021-08-19 | Stop reason: HOSPADM

## 2021-08-19 RX ORDER — BUPROPION HYDROCHLORIDE 150 MG/1
150 TABLET, EXTENDED RELEASE ORAL 2 TIMES DAILY
Status: DISCONTINUED | OUTPATIENT
Start: 2021-08-19 | End: 2021-08-20 | Stop reason: HOSPADM

## 2021-08-19 RX ORDER — ALLOPURINOL 300 MG/1
300 TABLET ORAL DAILY
Status: DISCONTINUED | OUTPATIENT
Start: 2021-08-19 | End: 2021-08-20 | Stop reason: HOSPADM

## 2021-08-19 RX ORDER — FENTANYL CITRATE 50 UG/ML
INJECTION, SOLUTION INTRAMUSCULAR; INTRAVENOUS PRN
Status: DISCONTINUED | OUTPATIENT
Start: 2021-08-19 | End: 2021-08-19 | Stop reason: SDUPTHER

## 2021-08-19 RX ORDER — ACETAMINOPHEN 500 MG
1000 TABLET ORAL ONCE
Status: COMPLETED | OUTPATIENT
Start: 2021-08-19 | End: 2021-08-19

## 2021-08-19 RX ORDER — PHENYLEPHRINE HCL IN 0.9% NACL 1 MG/10 ML
SYRINGE (ML) INTRAVENOUS PRN
Status: DISCONTINUED | OUTPATIENT
Start: 2021-08-19 | End: 2021-08-19 | Stop reason: SDUPTHER

## 2021-08-19 RX ORDER — ACETAMINOPHEN 325 MG/1
650 TABLET ORAL EVERY 6 HOURS
Status: DISCONTINUED | OUTPATIENT
Start: 2021-08-19 | End: 2021-08-20 | Stop reason: HOSPADM

## 2021-08-19 RX ORDER — ONDANSETRON 2 MG/ML
4 INJECTION INTRAMUSCULAR; INTRAVENOUS
Status: COMPLETED | OUTPATIENT
Start: 2021-08-19 | End: 2021-08-19

## 2021-08-19 RX ORDER — ONDANSETRON 4 MG/1
4 TABLET, ORALLY DISINTEGRATING ORAL EVERY 8 HOURS PRN
Status: DISCONTINUED | OUTPATIENT
Start: 2021-08-19 | End: 2021-08-20 | Stop reason: HOSPADM

## 2021-08-19 RX ORDER — FENTANYL CITRATE 50 UG/ML
25 INJECTION, SOLUTION INTRAMUSCULAR; INTRAVENOUS EVERY 5 MIN PRN
Status: DISCONTINUED | OUTPATIENT
Start: 2021-08-19 | End: 2021-08-19 | Stop reason: HOSPADM

## 2021-08-19 RX ORDER — PROPOFOL 10 MG/ML
INJECTION, EMULSION INTRAVENOUS CONTINUOUS PRN
Status: DISCONTINUED | OUTPATIENT
Start: 2021-08-19 | End: 2021-08-19 | Stop reason: SDUPTHER

## 2021-08-19 RX ORDER — EPINEPHRINE 1 MG/ML(1)
AMPUL (ML) INJECTION PRN
Status: DISCONTINUED | OUTPATIENT
Start: 2021-08-19 | End: 2021-08-19 | Stop reason: ALTCHOICE

## 2021-08-19 RX ORDER — VITAMIN B COMPLEX
2000 TABLET ORAL DAILY
Status: DISCONTINUED | OUTPATIENT
Start: 2021-08-19 | End: 2021-08-20 | Stop reason: HOSPADM

## 2021-08-19 RX ORDER — MIDAZOLAM HYDROCHLORIDE 1 MG/ML
INJECTION INTRAMUSCULAR; INTRAVENOUS PRN
Status: DISCONTINUED | OUTPATIENT
Start: 2021-08-19 | End: 2021-08-19 | Stop reason: SDUPTHER

## 2021-08-19 RX ORDER — LIDOCAINE HYDROCHLORIDE 10 MG/ML
INJECTION, SOLUTION EPIDURAL; INFILTRATION; INTRACAUDAL; PERINEURAL PRN
Status: DISCONTINUED | OUTPATIENT
Start: 2021-08-19 | End: 2021-08-19 | Stop reason: SDUPTHER

## 2021-08-19 RX ORDER — BUPIVACAINE HYDROCHLORIDE 7.5 MG/ML
INJECTION, SOLUTION INTRASPINAL PRN
Status: DISCONTINUED | OUTPATIENT
Start: 2021-08-19 | End: 2021-08-19 | Stop reason: SDUPTHER

## 2021-08-19 RX ORDER — CHOLESTYRAMINE LIGHT 4 G/5.7G
4 POWDER, FOR SUSPENSION ORAL DAILY
Status: DISCONTINUED | OUTPATIENT
Start: 2021-08-19 | End: 2021-08-20 | Stop reason: HOSPADM

## 2021-08-19 RX ORDER — VANCOMYCIN HYDROCHLORIDE 1 G/20ML
INJECTION, POWDER, LYOPHILIZED, FOR SOLUTION INTRAVENOUS PRN
Status: DISCONTINUED | OUTPATIENT
Start: 2021-08-19 | End: 2021-08-19 | Stop reason: ALTCHOICE

## 2021-08-19 RX ADMIN — Medication 100 MCG: at 09:09

## 2021-08-19 RX ADMIN — FENTANYL CITRATE 25 MCG: 50 INJECTION, SOLUTION INTRAMUSCULAR; INTRAVENOUS at 08:57

## 2021-08-19 RX ADMIN — Medication 100 MCG: at 09:37

## 2021-08-19 RX ADMIN — DOCUSATE SODIUM 50 MG AND SENNOSIDES 8.6 MG 1 TABLET: 8.6; 5 TABLET, FILM COATED ORAL at 20:48

## 2021-08-19 RX ADMIN — Medication 100 MCG: at 09:22

## 2021-08-19 RX ADMIN — ONDANSETRON 4 MG: 2 INJECTION INTRAMUSCULAR; INTRAVENOUS at 12:53

## 2021-08-19 RX ADMIN — TRANEXAMIC ACID 1000 MG: 1 INJECTION, SOLUTION INTRAVENOUS at 12:15

## 2021-08-19 RX ADMIN — Medication 2000 MG: at 08:59

## 2021-08-19 RX ADMIN — METOPROLOL TARTRATE 50 MG: 50 TABLET, FILM COATED ORAL at 20:49

## 2021-08-19 RX ADMIN — CELECOXIB 100 MG: 100 CAPSULE ORAL at 07:07

## 2021-08-19 RX ADMIN — Medication 100 MCG: at 09:20

## 2021-08-19 RX ADMIN — MIDAZOLAM 1 MG: 1 INJECTION INTRAMUSCULAR; INTRAVENOUS at 08:44

## 2021-08-19 RX ADMIN — BUPIVACAINE HYDROCHLORIDE IN DEXTROSE 1.8 ML: 7.5 INJECTION, SOLUTION SUBARACHNOID at 08:57

## 2021-08-19 RX ADMIN — Medication 100 MCG: at 10:07

## 2021-08-19 RX ADMIN — Medication 200 MCG: at 09:59

## 2021-08-19 RX ADMIN — FAMOTIDINE 20 MG: 10 INJECTION, SOLUTION INTRAVENOUS at 15:41

## 2021-08-19 RX ADMIN — APIXABAN 2.5 MG: 2.5 TABLET, FILM COATED ORAL at 20:55

## 2021-08-19 RX ADMIN — TRANEXAMIC ACID 1000 MG: 1 INJECTION, SOLUTION INTRAVENOUS at 09:11

## 2021-08-19 RX ADMIN — LIDOCAINE HYDROCHLORIDE 20 MG: 10 INJECTION, SOLUTION EPIDURAL; INFILTRATION; INTRACAUDAL; PERINEURAL at 08:57

## 2021-08-19 RX ADMIN — Medication 200 MCG: at 10:15

## 2021-08-19 RX ADMIN — PROPOFOL 40 MG: 10 INJECTION, EMULSION INTRAVENOUS at 09:12

## 2021-08-19 RX ADMIN — SODIUM CHLORIDE, POTASSIUM CHLORIDE, SODIUM LACTATE AND CALCIUM CHLORIDE: 600; 310; 30; 20 INJECTION, SOLUTION INTRAVENOUS at 10:20

## 2021-08-19 RX ADMIN — PROPOFOL 50 MCG/KG/MIN: 10 INJECTION, EMULSION INTRAVENOUS at 09:12

## 2021-08-19 RX ADMIN — ACETAMINOPHEN 1000 MG: 500 TABLET ORAL at 07:06

## 2021-08-19 RX ADMIN — Medication 100 MCG: at 09:27

## 2021-08-19 RX ADMIN — SODIUM CHLORIDE: 9 INJECTION, SOLUTION INTRAVENOUS at 15:38

## 2021-08-19 RX ADMIN — Medication 2000 MG: at 17:44

## 2021-08-19 RX ADMIN — ACETAMINOPHEN 650 MG: 325 TABLET ORAL at 20:49

## 2021-08-19 RX ADMIN — Medication 100 MCG: at 09:52

## 2021-08-19 RX ADMIN — BUPROPION HYDROCHLORIDE 150 MG: 150 TABLET, EXTENDED RELEASE ORAL at 20:48

## 2021-08-19 RX ADMIN — Medication 200 MCG: at 09:47

## 2021-08-19 RX ADMIN — SODIUM CHLORIDE, POTASSIUM CHLORIDE, SODIUM LACTATE AND CALCIUM CHLORIDE: 600; 310; 30; 20 INJECTION, SOLUTION INTRAVENOUS at 08:42

## 2021-08-19 RX ADMIN — Medication 100 MCG: at 09:11

## 2021-08-19 ASSESSMENT — PULMONARY FUNCTION TESTS
PIF_VALUE: 1
PIF_VALUE: 0
PIF_VALUE: 1
PIF_VALUE: 0
PIF_VALUE: 1

## 2021-08-19 ASSESSMENT — PAIN SCALES - GENERAL
PAINLEVEL_OUTOF10: 1
PAINLEVEL_OUTOF10: 0

## 2021-08-19 ASSESSMENT — PAIN - FUNCTIONAL ASSESSMENT: PAIN_FUNCTIONAL_ASSESSMENT: 0-10

## 2021-08-19 NOTE — ANESTHESIA PRE PROCEDURE
Department of Anesthesiology  Preprocedure Note       Name:  Juanita Tuttle   Age:  82 y.o.  :  1935                                          MRN:  [id number]         Date:  2021      Surgeon: Alexander Colon):  Pineda Vazquez MD    Procedure: Procedure(s):  LEFT HIP TOTAL ARTHROPLASTY  ++BIOMET++    Medications prior to admission:   Prior to Admission medications    Medication Sig Start Date End Date Taking? Authorizing Provider   levothyroxine (SYNTHROID) 125 MCG tablet Take 125 mcg by mouth Daily   Yes Historical Provider, MD   omeprazole (PRILOSEC) 20 MG delayed release capsule Take 20 mg by mouth daily Instructed to take morning of surgery with a sip of water   Yes Historical Provider, MD   ACCU-CHEK JOELLE PLUS strip  21  Yes Historical Provider, MD   lidocaine (LIDODERM) 5 % Place 1 patch onto the skin daily 12 hours on, 12 hours off. 18  Yes Mohini Lombardi, APRN - CNP   cholestyramine light 4 G packet Take 4 g by mouth daily    Yes Historical Provider, MD   isosorbide mononitrate (IMDUR) 30 MG CR tablet Take 30 mg by mouth daily Instructed to take morning of surgery with a sip of water   Yes Historical Provider, MD   buPROPion SR (WELLBUTRIN SR) 150 MG SR tablet Take 150 mg by mouth 2 times daily Instructed to take morning of surgery with a sip of water   Yes Historical Provider, MD   lisinopril (PRINIVIL;ZESTRIL) 10 MG tablet Take 10 mg by mouth daily. Yes Historical Provider, MD   Metoprolol Tartrate (LOPRESSOR PO) Take 50 mg by mouth 2 times daily Instructed to take morning of surgery with a sip of water   Yes Historical Provider, MD   Potassium Citrate (UROCIT-K 10 PO) Take 2 tablets by mouth 2 times daily    Yes Historical Provider, MD   allopurinol (ZYLOPRIM) 300 MG tablet Take 300 mg by mouth daily.  Taking for kidney stones   Yes Historical Provider, MD   aspirin 325 MG tablet Take 325 mg by mouth daily Last dose 21    Historical Provider, MD   Cholecalciferol (VITAMIN D3) 2000 UNITS CAPS Take 2,000 Units by mouth daily Last dose 08/12/21    Historical Provider, MD   multivitamin-iron-minerals-folic acid (CENTRUM) chewable tablet Take 1 tablet by mouth daily Last dose 8-12-21    Historical Provider, MD       Current medications:    Current Facility-Administered Medications   Medication Dose Route Frequency Provider Last Rate Last Admin    0.9 % sodium chloride infusion  25 mL Intravenous PRN NASREEN Musa        acetaminophen (TYLENOL) tablet 1,000 mg  1,000 mg Oral Once NASREEN Musa        ceFAZolin (ANCEF) 2000 mg in sterile water 20 mL IV syringe  2,000 mg Intravenous On Call to OR NASREEN Musa        celecoxib (CELEBREX) capsule 100 mg  100 mg Oral Once NASREEN Musa        dexamethasone (PF) (DECADRON) injection 8 mg  8 mg Intravenous Once NASREEN Musa        lactated ringers infusion   Intravenous Continuous NASREEN Musa        sodium chloride flush 0.9 % injection 5-40 mL  5-40 mL Intravenous 2 times per day NASREEN Musa        sodium chloride flush 0.9 % injection 5-40 mL  5-40 mL Intravenous PRN NASREEN Musa        tranexamic acid (CYKLOKAPRON) 1,000 mg in dextrose 5 % 100 mL IVPB  1,000 mg Intravenous On Call to [address] [place] [address]           Allergies:  No Known Allergies    Problem List:    Patient Active Problem List   Diagnosis Code    Primary osteoarthritis of right hip M16.11    Gout M10.9    Depression F32.9    Hyperlipidemia E78.5    HBP (high blood pressure) I10    Hypothyroid E03.9    H/O total hip arthroplasty (11-21-13: Hybrid R SHA)(Esequiel Lomeli MD) C70.683    CAD (coronary artery disease) I25.10    Primary osteoarthritis of right knee M17.11    S/P R total knee arthroplasty (2-19-15: Dr. Tato Blackwell) W91.020    Primary osteoarthritis of left knee M17.12    S/P L total knee arthroplasty (7-19-16: Romelia Hernandes M.D.) F83.247    Diabetes mellitus ([facility] E11.9    Primary osteoarthritis of 05/20/21 210 lb (95.3 kg)     Body mass index is 36.58 kg/m². CBC:   Lab Results   Component Value Date    WBC 7.1 08/13/2021    RBC 4.17 08/13/2021    HGB 13.3 08/13/2021    HCT 42.5 08/13/2021    .9 08/13/2021    RDW 14.3 08/13/2021     08/13/2021       CMP:   Lab Results   Component Value Date     08/13/2021    K 4.4 08/13/2021     08/13/2021    CO2 26 08/13/2021    BUN 27 08/13/2021    CREATININE 1.1 08/13/2021    GFRAA 57 08/13/2021    LABGLOM 47 08/13/2021    GLUCOSE 212 08/13/2021    GLUCOSE 122 01/27/2012    PROT 6.7 11/15/2013    CALCIUM 9.6 08/13/2021    BILITOT 0.7 11/15/2013    ALKPHOS 63 11/15/2013    AST 20 11/15/2013    ALT 22 11/15/2013       POC Tests: No results for input(s): POCGLU, POCNA, POCK, POCCL, POCBUN, POCHEMO, POCHCT in the last 72 hours.     Coags:   Lab Results   Component Value Date    PROTIME 17.4 11/24/2013    INR 2.1 11/24/2013       HCG (If Applicable): No results found for: PREGTESTUR, PREGSERUM, HCG, HCGQUANT     ABGs: No results found for: PHART, PO2ART, TUB5TMB, YUJ2EWX, BEART, S2UUYQOO     Type & Screen (If Applicable):  No results found for: LABABO, LABRH    Drug/Infectious Status (If Applicable):  No results found for: HIV, HEPCAB    COVID-19 Screening (If Applicable): No results found for: COVID19      EKG Narrative & Impression    Normal sinus rhythm  Low voltage QRS  Borderline ECG  No previous ECGs available  Confirmed by Eleanor Brink ([id number]) on 8/13/2021 3:53:18 PM             Anesthesia Evaluation  Patient summary reviewed and Nursing notes reviewed no history of anesthetic complications:   Airway: Mallampati: II  TM distance: >3 FB   Neck ROM: limited  Mouth opening: > = 3 FB Dental:    (+) caps      Pulmonary:normal exam  breath sounds clear to auscultation                            ROS comment: -ex smoker   Cardiovascular:    (+) hypertension: mild, past MI: > 6 months, CAD:, hyperlipidemia      ECG reviewed  Rhythm: regular  Rate: normal           Beta Blocker:  Dose within 24 Hrs      ROS comment: -HX of chest pain and MI but no intervention necessary and is no longer followed by cardiologist.      Neuro/Psych:   (+) depression/anxiety             GI/Hepatic/Renal:   (+) GERD: well controlled,          ROS comment: -kidney stones  -Elevated BUN/Creatinine on previous BMP. Endo/Other:    (+) DiabetesType II DM, well controlled, , hypothyroidism: arthritis:., .                 Abdominal:             Vascular:           ROS comment: -HX of blood clots, DVT in left leg. Other Findings:           Anesthesia Plan      spinal     ASA 3       Induction: intravenous. Anesthetic plan and risks discussed with patient. Use of blood products discussed with patient whom. Plan discussed with CRNA and attending.                 Slade Peterson RN   8/19/2021

## 2021-08-19 NOTE — ANESTHESIA POSTPROCEDURE EVALUATION
Department of Anesthesiology  Postprocedure Note    Patient: Lolita Bass  MRN: [id number]  [place]: 1935  Date of evaluation: 8/19/2021  Time:  11:35 AM     Procedure Summary     Date: 08/19/21 Room / Location: [facility] OR 04 / [facility]    Anesthesia Start: 3073 Anesthesia Stop: 8378    Procedure: LEFT HIP TOTAL ARTHROPLASTY (Left Hip) Diagnosis: (OSTEOARTHRITIS)    Surgeons: Author Reilly MD Responsible Provider: Za Garibay MD    Anesthesia Type: spinal ASA Status: 3          Anesthesia Type: spinal    Carleen Phase I: Carleen Score: 10    Carleen Phase II:      Last vitals: Reviewed and per EMR flowsheets.        Anesthesia Post Evaluation    Patient location during evaluation: PACU  Patient participation: complete - patient participated  Level of consciousness: awake  Pain score: 3  Airway patency: patent  Nausea & Vomiting: no nausea  Complications: no  Cardiovascular status: blood pressure returned to baseline  Respiratory status: acceptable  Hydration status: euvolemic

## 2021-08-19 NOTE — PROGRESS NOTES
Occupational Therapy  OCCUPATIONAL THERAPY INITIAL EVALUATION  [address]    Date: 2021     Patient Name: Twan Stephens  MRN: [id number]  : 1935  Room: [address] [place] [address]    Evaluating OT: Suzie Tucker, OTR/L - [id number]    Referring Provider: Luiza Carolina MD  Specific Provider Orders/Date: \"OT eval and treat\" - 2021    Diagnosis: Primary osteoarthritis of left hip [M16.12]    Surgery: Patient underwent L SHA on 2021.   Pertinent Medical History: h/o R SHA (per patient report), CAD, DM, HTN, OA  Past Medical History:   Diagnosis Date    Arthritis     osteo    CAD (coronary artery disease)     follows with PCP    Depression     Diabetes mellitus ([facility]     borderline     H/O cardiovascular stress test 2016    report on chart    Heartburn     History of kidney stones     Hx of blood clots     h/o    Hyperlipidemia     Hypertension     Left hip pain 2021    Myocardial infarct ([facility]     3 mild episodes    Thyroid disease     Use of cane as ambulatory aid     Vitamin D deficiency      Past Surgical History:   Procedure Laterality Date    ARTHROGRAPHY Left 2021    LEFT HIP INJECTION ARTHROGRAM performed by Luiza Carolina MD at [address] [place] [address] Bilateral     HYSTERECTOMY      JOINT REPLACEMENT Right     hip    KNEE ARTHROPLASTY Left 2016    OTHER SURGICAL HISTORY Right 2013    hip arthrogram    PARATHYROID GLAND SURGERY  's    TOTAL HIP ARTHROPLASTY Left 2021    LEFT HIP TOTAL ARTHROPLASTY performed by Luiza Carolina MD at [place] [address] Right 2/19/15    RIGHT TOTAL KNEE ARTHROPLASTY        Precautions: fall risk, posterolateral hip precautions, FWBAT    Assessment of Current Deficits:    [x] Functional mobility   [x]ADLs  [x] Strength [x]Cognition   [x] Functional transfers   [x] IADLs         [x] Safety Awareness   [x]Endurance   [] Fine Coordination              [x] Balance      [] Vision/perception   [x]Sensation    []Gross Motor Coordination  [] ROM  [] Delirium                   [] Motor Control     OT PLAN OF CARE   OT POC is based on physician orders, patient diagnosis, and results of clinical assessment. Frequency/Duration 2-5 days/week for 2 weeks PRN   Specific OT Treatment Interventions to Include:   * Instruction/training on adapted ADL techniques and AE recommendations to increase functional independence within precautions       * Training on energy conservation strategies, correct breathing pattern and techniques to improve independence/tolerance for self-care routine  * Functional transfer/mobility training/DME recommendations for increased independence, safety, and fall prevention  * Patient/Family education to increase follow through with safety techniques and functional independence  * Recommendation of environmental modifications for increased safety with functional transfers/mobility and ADLs  * Therapeutic exercise to improve motor endurance, ROM, and functional strength for ADLs/functional transfers  * Therapeutic activities to facilitate/challenge dynamic balance, stand tolerance for increased safety and independence with ADLs  * Neuro-muscular re-education: facilitation of righting/equilibrium reactions, midline orientation, scapular stability/mobility, normalization of muscle tone, and facilitation of volitional active controled movement    Recommended Adaptive Equipment: TBD    Home Living: Patient lives alone in a one-floor home; laundry is in the basement.   Bathroom Setup: tub shower and walk-in shower (with seat available) - no grab bars; elevated toilet seat  Equipment Owned: cane, walker    Prior Level of Function (PLOF): Patient reported that she was independent with ADLs, IADLs, and functional mobility (with cane recently). Patient stated that her daughter-in-law (who lives in [place]) will stay with her for about a week upon patient's return home. Pain Level: Patient reported experiencing pain in her L hip with mobility, but did not rate her pain. Cognition: Patient alert and oriented grossly. WFL command follow demonstrated. Mild confusion noted. Patient pleasant and cooperative. Memory: Impaired  Sequencing: Fair  Problem Solving: Fair  Judgement/Safety: Fair    Functional Assessment:  AM-PAC Daily Activity Raw Score: 15/24   Initial Eval Status  Date: 8/19/2021 Treatment Status  Date:  Short Term Goals = Long Term Goals   Feeding Setup     Grooming Min A  Mod I  (seated/standing at sink)   UB Dressing Min A  SBA   LB Dressing Max A  SBA while demonstrating Good adherence to posterolateral hip precautions and Good understanding AE use. Bathing Max A  SBA while demonstrating Good adherence to posterolateral hip precautions and Good understanding AE/DME use. Toileting Max A  Min A   Bed Mobility  Supine-to-Sit: Mod A     Functional Transfers Sit-to-Stand: Mod A   from EOB  Cues needed to maximize safety. SBA   Functional Mobility Min A   (with walker) for few side steps toward HOB. Limited secondary to nausea and dizziness. Supervision with functional mobility (with device, as needed/appropriate) in order to maximize independence with ADLs/IADLs and other functional tasks. Balance Sitting: Good-  (at EOB)  Standing: Fair-  (with walker)  Fair+ dynamic standing balance during completion of ADLs/IADLs and other functional tasks. Activity Tolerance Limited secondary to dizziness and nausea; nursing aware. Patient will demonstrate Good understanding and consistent implementation of energy conservation techniques and work simplification techniques into ADL/IADL routines.    Visual/  Perceptual WFL     N/A     Additional Long-Term Goal: Patient will increase functional independence to PLOF in order to allow patient to live in least restrictive environment. Strength: ROM: Additional Information:    R UE  4-/5  WFL    L UE 4-/5 WFL      Hearing: WFL  Sensation: No complaints of numbness/tingling in B UEs. Tone: WFL  Edema: No    Comments: RN approved patient's participation in [address] [place] [address] activities. Upon arrival, patient supine in bed. At end of session, patient supine in bed with call light and phone within reach and all lines and tubes intact. Patient would benefit from continued skilled OT to increase safety and independence with completion of ADL/IADL tasks for functional independence and quality of life. Rehab Potential: Good for established goals. Patient / Family Goal: Patient wants to go to SNF/[facility]. Patient and/or family were instructed on functional diagnosis, prognosis/goals, and OT plan of care. Demonstrated Fair understanding. Eval Complexity: Low    Time In: 1600  Time Out: 1620  Total Treatment Time: 0 minutes      Minutes Units   OT Eval Low 69768 20 1   OT Eval Medium 63904     OT Eval High 36277     OT Re-Eval [id number]     Therapeutic Ex 33821     Therapeutic Activities 14401     ADL/Self Care 22379     Orthotic Management 02580     Neuro Re-Ed 81495     Non-Billable Time N/A ---     Evaluation time includes thorough review of current medical information, gathering information on past medical history/social history and prior level of function, completion of standardized testing/informal observation of tasks, assessment of data, and education on plan of care and goals. Lauren L. Vann Brunner, HANNAH/L  License Number: [id number]

## 2021-08-19 NOTE — BRIEF OP NOTE
Brief Postoperative Note      Patient: Reji Mccloud  YOB: 1935  MRN: [id number]    Date of Procedure: 8/19/2021    Pre-Op Diagnosis: Severe left hip OSTEOARTHRITIS    Post-Op Diagnosis: Same       Procedure(s):  LEFT HIP TOTAL ARTHROPLASTY    Surgeon(s):  Ramon Paniagua MD    Assistant:  Surgical Assistant: Lio Miner RN  Physician Assistant: NASREEN Bautista    Anesthesia: Spinal    Estimated Blood Loss (mL): 360    Complications: None    Specimens:   ID Type Source Tests Collected by Time Destination   A : LEFT FEMORAL HEAD Bone Bone SURGICAL PATHOLOGY Ramon Paniagua MD 8/19/2021 2753        Implants:  Implant Name Type Inv.  Item Serial No.  Lot No. LRB No. Used Action   CEMENT BNE 20ML 41GM FULL DOSE PMMA W/ TOBRA M VISC RADPQ  CEMENT BNE 20ML 41GM FULL DOSE PMMA W/ TOBRA M VISC RADPQ  [employer] [facility] [id number] Left 1 Implanted   CEMENT BNE 20ML 41GM FULL DOSE PMMA W/ TOBRA M VISC RADPQ  CEMENT BNE 20ML 41GM FULL DOSE PMMA W/ TOBRA M VISC RADPQ  [employer] [facility] [id number] Left 1 Implanted   IMPL SHELL ACET 3 HL 50MM G7 Leg/Ankle/Foot/Toe IMPL SHELL ACET 3 HL 50MM G7  VIANCA [facility] [id number] Left 1 Implanted   LINER ACET 36 MM HIP  LINER ACET 36 MM HIP  [facility] [id number] Left 1 Implanted   RESTRICTOR SLIM M [id number] UNIV POLYMER IM INSRT DISP  RESTRICTOR SLIM M [id number] UNIV POLYMER IM INSRT DISP  [employer] [facility] [id number] Left 1 Implanted   VERSYS DISTAL CENTRALIZER 12MM  VERSYS DISTAL CENTRALIZER 12MM  [employer] [facility] [id number] Left 1 Implanted   VERSYS ADVOCATE CEMENTED STEM [id number] STANDARD  VERSYS ADVOCATE CEMENTED STEM [id number] STANDARD  [employer]- [id number] Left 1 Implanted   VIANCA BIOMET COCR FEMORAL HEAD 36MM     [id number] Left 1 Implanted         Drains:   Urethral Catheter Non-latex;Straight-tip 16 fr (Active)       Findings: Bone-on-bone DJD with osteopenia    Electronically signed by Lew Cameron Aleksey Sy MD on 8/19/2021 at 11:20 AM

## 2021-08-19 NOTE — ANESTHESIA PROCEDURE NOTES
Spinal Block    Patient location during procedure: OR  Reason for block: primary anesthetic and at surgeon's request  Staffing  Performed: other anesthesia staff   Anesthesiologist: Danya Gray MD  Resident/CRNA: Mortimer Ohara, APRN - CRNA  Other anesthesia staff: Rosamaria Prieto RN  Preanesthetic Checklist  Completed: patient identified, IV checked, site marked, risks and benefits discussed, surgical consent, monitors and equipment checked, pre-op evaluation, timeout performed, anesthesia consent given, oxygen available and patient being monitored  Spinal Block  Patient position: sitting  Prep: Betadine  Patient monitoring: cardiac monitor, continuous pulse ox, frequent blood pressure checks and continuous capnometry  Approach: midline  Location: L2/L3  Provider prep: mask and sterile gloves  Local infiltration: lidocaine  Agent: bupivacaine  Adjuvant: fentanyl  Needle  Needle type: Quincke   Needle gauge: 22 G  Needle length: 3.5 in  Assessment  Swirl obtained: Yes  CSF: clear  Attempts: 1  Hemodynamics: stable

## 2021-08-19 NOTE — PROGRESS NOTES
Physical Therapy    Facility/Department: [facility] SURGERY  Initial Assessment    NAME: Alanna Sosa  : 1935  MRN: [id number]    Date of Service: 2021       REQUIRES PT FOLLOW UP: Yes       Patient Diagnosis(es): There were no encounter diagnoses. has a past medical history of Arthritis, CAD (coronary artery disease), Depression, Diabetes mellitus ([facility] H/O cardiovascular stress test, Heartburn, History of kidney stones, Hx of blood clots, Hyperlipidemia, Hypertension, Left hip pain, Myocardial infarct [facility]), Thyroid disease, Use of cane as ambulatory aid, and Vitamin D deficiency. has a past surgical history that includes Hysterectomy; Parathyroid gland surgery (8003'E); other surgical history (Right, 2013); Cardiac catheterization ( and ); Total knee arthroplasty (Right, 2/19/15); Knee Arthroplasty (Left, 2016); Cataract removal with implant (Bilateral); arthrography (Left, 2021); joint replacement (Right, ); and Total hip arthroplasty (Left, 2021). Evaluating Therapist: Abdirahman Osei, PT     rec ww     Referring Provider:  Dr. Talia Lyn     PT order :  PT eval and treat     Room #: 575   DIAGNOSIS:  OA s/p L SHA 2021     PRECAUTIONS: falls, posterolateral hip precautions, FWBAT      Social:  Pt lives alone  in a  1  floor plan  1+1  steps to enter. Prior to admission pt walked with  [email or web address]      Initial Evaluation  Date:  2021  Treatment      Short Term/ Long Term   Goals   Was pt agreeable to Eval/treatment? yes      Does pt have pain? L hip pain and  nausea      Bed Mobility  Rolling: NT   Supine to sit:  Mod assist  Sit to supine: Mod assist   Scooting: Mod assist    SBA    Transfers Sit to stand:   Mod assist   Stand to sit: mod assist   Stand pivot:  NT    SBA    Ambulation     4 feet side stepping with ww   with min assist    150  feet with  ww  with SBA        Stair negotiation: ascended and descended NT    4 steps with B  rail with  CGA LE ROM  Decreased throughout L hip, distally WFL      LE strength  2- to 3-/ 5 L hip, 3+ to 4-/ 5 distally   3-/ 5 L hip    AM- PAC RAW score  11/ 24            Pt is alert and Oriented x  3. Some mild confusion noted      Balance:  Min assist . High fall risk due to weakness     Endurance: poor        ASSESSMENT  Pt displays functional ability as noted in the objective portion of this evaluation. Conditions Requiring Skilled Therapeutic Intervention:    [x]Decreased strength     [x]Decreased ROM  [x]Decreased functional mobility  [x]Decreased balance   [x]Decreased endurance   [x]Decreased posture  []Decreased sensation  [x]Decreased coordination   []Decreased vision  [x]Decreased safety awareness   [x]Increased pain         Treatment/Education:    Mobility as above. Pt instructed in hip precautions prior to mobility. Pt reports nausea throughout session, RN aware. Cues for technique with bed mobility. Once seated EOB, pt with emesis. Cues for hand and foot placement with transfers. Due to nausea anddizziness , pt side stepped to [facility] and RTB for safety. RN informed     Pt educated on fall risk, safe and proper technique with mobility, LE exercises, hip precautions        Patient response to education:   Pt verbalized understanding Pt demonstrated skill Pt requires further education in this area    x  with cues/assist   x       Comments:  Pt left  In bed after session, with call light in reach. Rehab potential is Good for reaching above PT goals. Pts/ family goals   1. Rehab     Patient and or family understand(s) diagnosis, prognosis, and plan of care. -  Yes     PLAN  PT care will be provided in accordance with the objectives noted above. Whenever appropriate, clear delegation orders will be provided for nursing staff. Exercises and functional mobility practice will be used as well as appropriate assistive devices or modalities to obtain goals.  Patient and family education will also be administered as needed. PLAN OF CARE:    Current Treatment Recommendations     [x] Strengthening to improve independence with functional mobility   [x] ROM to improve independence with functional mobility   [x] Balance Training to improve static/dynamic balance and to reduce fall risk  [x] Endurance Training to improve activity tolerance during functional mobility   [x] Transfer Training to improve safety and independence with all functional transfers   [x] Gait Training to improve gait mechanics, endurance and assess need for appropriate assistive device  [x] Stair Training in preparation for safe discharge home and/or into the community   [x] Positioning to prevent skin breakdown and contractures  [x] Safety and Education Training   [x] Patient/Caregiver Education   [x] HEP  [] Other     Frequency of treatments will be BID x 2-3 days. Time in: 1557   Time out:  1620       Evaluation Time includes thorough review of current medical information, gathering information on past medical history/social history and prior level of function, completion of standardized testing/informal observation of tasks, assessment of data and education on plan of care and goals.     CPT codes:  [x] Low Complexity PT evaluation 07439  [] Moderate Complexity PT evaluation 20649  [] High Complexity PT evaluation 98059  [] PT Re-evaluation 36145  [] Gait training 79549  minutes  [] Therapeutic activities 46883  minutes  [] Therapeutic exercises 78244  minutes  [] Neuromuscular reeducation 53596  minutes       Tenisha Lan number:  PT 4146

## 2021-08-20 VITALS
DIASTOLIC BLOOD PRESSURE: 54 MMHG | BODY MASS INDEX: 36.8 KG/M2 | OXYGEN SATURATION: 95 % | HEIGHT: 62 IN | TEMPERATURE: 98 F | WEIGHT: 200 LBS | HEART RATE: 83 BPM | SYSTOLIC BLOOD PRESSURE: 122 MMHG | RESPIRATION RATE: 16 BRPM

## 2021-08-20 LAB
ANION GAP SERPL CALCULATED.3IONS-SCNC: 6 MMOL/L (ref 7–16)
BUN BLDV-MCNC: 31 MG/DL (ref 6–23)
CALCIUM SERPL-MCNC: 8.6 MG/DL (ref 8.6–10.2)
CHLORIDE BLD-SCNC: 108 MMOL/L (ref 98–107)
CO2: 25 MMOL/L (ref 22–29)
CREAT SERPL-MCNC: 1.3 MG/DL (ref 0.5–1)
GFR AFRICAN AMERICAN: 47
GFR NON-AFRICAN AMERICAN: 39 ML/MIN/1.73
GLUCOSE BLD-MCNC: 159 MG/DL (ref 74–99)
HCT VFR BLD CALC: 35.8 % (ref 34–48)
HEMOGLOBIN: 11.1 G/DL (ref 11.5–15.5)
MCH RBC QN AUTO: 32.3 PG (ref 26–35)
MCHC RBC AUTO-ENTMCNC: 31 % (ref 32–34.5)
MCV RBC AUTO: 104.1 FL (ref 80–99.9)
METER GLUCOSE: 142 MG/DL (ref 74–99)
METER GLUCOSE: 168 MG/DL (ref 74–99)
PDW BLD-RTO: 14 FL (ref 11.5–15)
PLATELET # BLD: 196 E9/L (ref 130–450)
PMV BLD AUTO: 9.9 FL (ref 7–12)
POTASSIUM SERPL-SCNC: 5.2 MMOL/L (ref 3.5–5)
RBC # BLD: 3.44 E12/L (ref 3.5–5.5)
SARS-COV-2, NAAT: NOT DETECTED
SODIUM BLD-SCNC: 139 MMOL/L (ref 132–146)
WBC # BLD: 9 E9/L (ref 4.5–11.5)

## 2021-08-20 PROCEDURE — 97530 THERAPEUTIC ACTIVITIES: CPT

## 2021-08-20 PROCEDURE — 96375 TX/PRO/DX INJ NEW DRUG ADDON: CPT

## 2021-08-20 PROCEDURE — 97110 THERAPEUTIC EXERCISES: CPT

## 2021-08-20 PROCEDURE — 80048 BASIC METABOLIC PNL TOTAL CA: CPT

## 2021-08-20 PROCEDURE — 2580000003 HC RX 258: Performed by: ORTHOPAEDIC SURGERY

## 2021-08-20 PROCEDURE — 96374 THER/PROPH/DIAG INJ IV PUSH: CPT

## 2021-08-20 PROCEDURE — 36415 COLL VENOUS BLD VENIPUNCTURE: CPT

## 2021-08-20 PROCEDURE — 6360000002 HC RX W HCPCS: Performed by: ORTHOPAEDIC SURGERY

## 2021-08-20 PROCEDURE — 82962 GLUCOSE BLOOD TEST: CPT

## 2021-08-20 PROCEDURE — 2500000003 HC RX 250 WO HCPCS: Performed by: ORTHOPAEDIC SURGERY

## 2021-08-20 PROCEDURE — 87635 SARS-COV-2 COVID-19 AMP PRB: CPT

## 2021-08-20 PROCEDURE — 6370000000 HC RX 637 (ALT 250 FOR IP): Performed by: ORTHOPAEDIC SURGERY

## 2021-08-20 PROCEDURE — G0378 HOSPITAL OBSERVATION PER HR: HCPCS

## 2021-08-20 PROCEDURE — 97535 SELF CARE MNGMENT TRAINING: CPT

## 2021-08-20 PROCEDURE — 6370000000 HC RX 637 (ALT 250 FOR IP): Performed by: INTERNAL MEDICINE

## 2021-08-20 PROCEDURE — 85027 COMPLETE CBC AUTOMATED: CPT

## 2021-08-20 RX ORDER — DEXTROSE MONOHYDRATE 50 MG/ML
100 INJECTION, SOLUTION INTRAVENOUS PRN
Status: DISCONTINUED | OUTPATIENT
Start: 2021-08-20 | End: 2021-08-20 | Stop reason: HOSPADM

## 2021-08-20 RX ORDER — OXYCODONE HYDROCHLORIDE 5 MG/1
5 TABLET ORAL EVERY 4 HOURS PRN
Qty: 42 TABLET | Refills: 0 | Status: SHIPPED | OUTPATIENT
Start: 2021-08-20 | End: 2021-08-27

## 2021-08-20 RX ORDER — DEXTROSE MONOHYDRATE 25 G/50ML
12.5 INJECTION, SOLUTION INTRAVENOUS PRN
Status: DISCONTINUED | OUTPATIENT
Start: 2021-08-20 | End: 2021-08-20 | Stop reason: HOSPADM

## 2021-08-20 RX ORDER — SENNA AND DOCUSATE SODIUM 50; 8.6 MG/1; MG/1
1 TABLET, FILM COATED ORAL 2 TIMES DAILY
Qty: 60 TABLET | Refills: 0 | Status: SHIPPED | OUTPATIENT
Start: 2021-08-20 | End: 2021-09-19

## 2021-08-20 RX ORDER — NICOTINE POLACRILEX 4 MG
15 LOZENGE BUCCAL PRN
Status: DISCONTINUED | OUTPATIENT
Start: 2021-08-20 | End: 2021-08-20 | Stop reason: HOSPADM

## 2021-08-20 RX ADMIN — OXYCODONE 5 MG: 5 TABLET ORAL at 03:19

## 2021-08-20 RX ADMIN — DOCUSATE SODIUM 50 MG AND SENNOSIDES 8.6 MG 1 TABLET: 8.6; 5 TABLET, FILM COATED ORAL at 09:41

## 2021-08-20 RX ADMIN — METOPROLOL TARTRATE 50 MG: 50 TABLET, FILM COATED ORAL at 09:41

## 2021-08-20 RX ADMIN — ISOSORBIDE MONONITRATE 30 MG: 30 TABLET, EXTENDED RELEASE ORAL at 09:41

## 2021-08-20 RX ADMIN — Medication 2000 UNITS: at 09:40

## 2021-08-20 RX ADMIN — ACETAMINOPHEN 650 MG: 325 TABLET ORAL at 14:03

## 2021-08-20 RX ADMIN — INSULIN LISPRO 1 UNITS: 100 INJECTION, SOLUTION INTRAVENOUS; SUBCUTANEOUS at 09:42

## 2021-08-20 RX ADMIN — INSULIN LISPRO 1 UNITS: 100 INJECTION, SOLUTION INTRAVENOUS; SUBCUTANEOUS at 11:37

## 2021-08-20 RX ADMIN — FAMOTIDINE 20 MG: 10 INJECTION, SOLUTION INTRAVENOUS at 09:41

## 2021-08-20 RX ADMIN — Medication 2000 MG: at 00:58

## 2021-08-20 RX ADMIN — CHOLESTYRAMINE 4 G: 4 POWDER, FOR SUSPENSION ORAL at 09:41

## 2021-08-20 RX ADMIN — BUPROPION HYDROCHLORIDE 150 MG: 150 TABLET, EXTENDED RELEASE ORAL at 09:40

## 2021-08-20 RX ADMIN — SODIUM CHLORIDE, PRESERVATIVE FREE 10 ML: 5 INJECTION INTRAVENOUS at 09:41

## 2021-08-20 RX ADMIN — APIXABAN 2.5 MG: 2.5 TABLET, FILM COATED ORAL at 09:40

## 2021-08-20 RX ADMIN — LEVOTHYROXINE SODIUM 125 MCG: 125 TABLET ORAL at 05:59

## 2021-08-20 RX ADMIN — MULTIVITAMIN TABLET 1 TABLET: TABLET at 09:40

## 2021-08-20 RX ADMIN — ACETAMINOPHEN 650 MG: 325 TABLET ORAL at 09:41

## 2021-08-20 ASSESSMENT — ENCOUNTER SYMPTOMS
COLOR CHANGE: 0
APNEA: 0
SINUS PRESSURE: 0
PHOTOPHOBIA: 0
CHEST TIGHTNESS: 0
VOMITING: 0
SORE THROAT: 0
ABDOMINAL PAIN: 0
WHEEZING: 0
DIARRHEA: 0
CONSTIPATION: 0
ABDOMINAL DISTENTION: 0
SHORTNESS OF BREATH: 0
BACK PAIN: 0

## 2021-08-20 ASSESSMENT — PAIN DESCRIPTION - PROGRESSION
CLINICAL_PROGRESSION: NOT CHANGED
CLINICAL_PROGRESSION: NOT CHANGED

## 2021-08-20 ASSESSMENT — PAIN DESCRIPTION - PAIN TYPE: TYPE: SURGICAL PAIN

## 2021-08-20 ASSESSMENT — PAIN SCALES - GENERAL
PAINLEVEL_OUTOF10: 0
PAINLEVEL_OUTOF10: 6
PAINLEVEL_OUTOF10: 0

## 2021-08-20 ASSESSMENT — PAIN DESCRIPTION - DESCRIPTORS: DESCRIPTORS: ACHING;DISCOMFORT;SORE

## 2021-08-20 ASSESSMENT — PAIN - FUNCTIONAL ASSESSMENT: PAIN_FUNCTIONAL_ASSESSMENT: PREVENTS OR INTERFERES SOME ACTIVE ACTIVITIES AND ADLS

## 2021-08-20 ASSESSMENT — PAIN DESCRIPTION - ORIENTATION: ORIENTATION: LEFT

## 2021-08-20 ASSESSMENT — PAIN DESCRIPTION - ONSET: ONSET: ON-GOING

## 2021-08-20 ASSESSMENT — PAIN DESCRIPTION - LOCATION: LOCATION: HIP

## 2021-08-20 NOTE — CARE COORDINATION
Met with patient about diagnosis and discharge plan of care. Post op day 1 left SHA. Pt lives alone in ranch home. Pt would like rehab and [facility]. Referral called to endy Randhawa, she accepted and pre cert started-o    Discharge to [facility] today at 430 pm. garrett to pick pt up. Forms on chart. Pt, staff, facility notified.  Forms on chart-[facility] [place]

## 2021-08-20 NOTE — CONSULTS
Internal Medicine consultation  08/20/21  Primary Care:  Mary Lou Lopez DO  [address] / [place] [address]        CC: Left hip pain    HPI:  Patient is an 82year old female who presented to [address] [place] [address] due to continued left hip pain that failed conservative treatments. She underwent left total hip replacement on 8/19/2021 by Dr. Wesley Tabares. Patient has been doing well post procedure. Her pain is very well controlled. She denies any fevers, chills, cough, shortness of breath, chest pains, nausea, vomiting or diarrhea. She has not passed gas. She is irritated with her catheter as she feels like she needs to pee. Prior to Visit Medications    Medication Sig Taking? Authorizing Provider   levothyroxine (SYNTHROID) 125 MCG tablet Take 125 mcg by mouth Daily Yes Historical Provider, MD   aspirin 325 MG tablet Take 325 mg by mouth daily Last dose 8-12-21 Yes Historical Provider, MD   omeprazole (PRILOSEC) 20 MG delayed release capsule Take 20 mg by mouth daily Instructed to take morning of surgery with a sip of water Yes Historical Provider, MD   [address] [place] [address]  Yes Historical Provider, MD   lidocaine (LIDODERM) 5 % Place 1 patch onto the skin daily 12 hours on, 12 hours off. Yes ION Vasques CNP   cholestyramine light 4 G packet Take 4 g by mouth daily  Yes Historical Provider, MD   isosorbide mononitrate (IMDUR) 30 MG CR tablet Take 30 mg by mouth daily Instructed to take morning of surgery with a sip of water Yes Historical Provider, MD   buPROPion SR (WELLBUTRIN SR) 150 MG SR tablet Take 150 mg by mouth 2 times daily Instructed to take morning of surgery with a sip of water Yes Historical Provider, MD   lisinopril (PRINIVIL;ZESTRIL) 10 MG tablet Take 10 mg by mouth daily.  Yes Historical Provider, MD   Metoprolol Tartrate (LOPRESSOR PO) Take 50 mg by mouth 2 times daily Instructed to take morning of surgery with a sip of water Yes Historical Provider, MD Potassium Citrate (UROCIT-K 10 PO) Take 2 tablets by mouth 2 times daily  Yes Historical Provider, MD   allopurinol (ZYLOPRIM) 300 MG tablet Take 300 mg by mouth daily. Taking for kidney stones Yes Historical Provider, MD   Cholecalciferol (VITAMIN D3) 2000 UNITS CAPS Take 2,000 Units by mouth daily Last dose 21 Yes Historical Provider, MD   multivitamin-iron-minerals-folic acid (CENTRUM) chewable tablet Take 1 tablet by mouth daily Last dose 21 Yes Historical Provider, MD     Social History     Tobacco Use    Smoking status: Former Smoker     Quit date: 1974     Years since quittin.3    Smokeless tobacco: Never Used   Vaping Use    Vaping Use: Never used   Substance Use Topics    Alcohol use: Not Currently     Comment: very rare    Drug use: No     History reviewed. No pertinent family history.   Past Surgical History:   Procedure Laterality Date    ARTHROGRAPHY Left 2021    LEFT HIP INJECTION ARTHROGRAM performed by Johnnie Calderón MD at [facility] and [address] Bilateral     HYSTERECTOMY      JOINT REPLACEMENT Right     hip    KNEE ARTHROPLASTY Left 2016    OTHER SURGICAL HISTORY Right 2013    hip arthrogram    PARATHYROID GLAND SURGERY  1990's    TOTAL HIP ARTHROPLASTY Left 2021    LEFT HIP TOTAL ARTHROPLASTY performed by Johnnie Calderón MD at [address] Right 2/19/15    RIGHT TOTAL KNEE ARTHROPLASTY      Past Medical History:   Diagnosis Date    Arthritis     osteo    CAD (coronary artery disease)     follows with PCP    Depression     Diabetes mellitus ([facility] [address]     borderline     H/O cardiovascular stress test 2016    report on chart    Heartburn     History of kidney stones     Hx of blood clots     h/o    Hyperlipidemia     Hypertension     Left hip pain 2021    Myocardial infarct [facility]) 1997    3 mild episodes    Thyroid disease     Use of cane as ambulatory aid     Vitamin D deficiency      Review of Systems   Constitutional: Positive for activity change. Negative for chills, fatigue and fever. HENT: Negative for congestion, ear discharge, ear pain, sinus pressure and sore throat. Eyes: Negative for photophobia and visual disturbance. Respiratory: Negative for apnea, chest tightness, shortness of breath and wheezing. Cardiovascular: Negative for chest pain, palpitations and leg swelling. Gastrointestinal: Negative for abdominal distention, abdominal pain, constipation, diarrhea and vomiting. Endocrine: Negative for polyphagia and polyuria. Genitourinary: Negative for dysuria, frequency and hematuria. Musculoskeletal: Positive for arthralgias and gait problem. Negative for back pain, neck pain and neck stiffness. Skin: Negative for color change and pallor. Allergic/Immunologic: Negative for environmental allergies and food allergies. Neurological: Negative for dizziness and light-headedness. Hematological: Negative for adenopathy. Does not bruise/bleed easily. Psychiatric/Behavioral: Negative for agitation and confusion. Vitals:    08/20/21 0317   BP: (!) 146/59   Pulse: 84   Resp: 16   Temp: 97.5 °F (36.4 °C)   SpO2: 98%       Physical Exam  Constitutional:       Appearance: Normal appearance. HENT:      Head: Normocephalic and atraumatic. Right Ear: External ear normal.      Left Ear: External ear normal.      Nose: Nose normal. No congestion. Mouth/Throat:      Mouth: Mucous membranes are moist.      Pharynx: Oropharynx is clear. Eyes:      Extraocular Movements: Extraocular movements intact. Conjunctiva/sclera: Conjunctivae normal.      Pupils: Pupils are equal, round, and reactive to light. Cardiovascular:      Rate and Rhythm: Normal rate and regular rhythm. Pulses: Normal pulses. Pulmonary:      Effort: Pulmonary effort is normal.      Breath sounds: Normal breath sounds.  No wheezing, rhonchi or rales. Abdominal:      General: Bowel sounds are normal. There is no distension. Palpations: Abdomen is soft. Tenderness: There is no abdominal tenderness. There is no guarding or rebound. Genitourinary:     Comments: Mchugh catheter intact. Musculoskeletal:      Cervical back: Normal range of motion and neck supple. Right lower leg: No edema. Left lower leg: No edema. Comments: Left hip incision bandaged clean and dry. Skin:     General: Skin is warm and dry. Capillary Refill: Capillary refill takes less than 2 seconds. Neurological:      General: No focal deficit present. Mental Status: She is alert. Cranial Nerves: No cranial nerve deficit. Psychiatric:         Mood and Affect: Mood normal.         Behavior: Behavior normal.         Principal Problem:    Primary osteoarthritis of left hip  Resolved Problems:    * No resolved hospital problems. *  1. Left hip osteoarthritis s/p SHA  2. Postoperative anemia  3. CECY  4. Hyperkalemia  5. Hypertension  6. Diabetes Mellitus Type II     Plan:  Hold lisinopril and nephrotoxins. Continue on weight bearing, pain medication and dvt prophylaxis per Orthopedics. Continue on IVF. Repeat BMP this afternoon. DVT Prophylaxis: eliquis  Code Status: Full     Thank you Dr. Vijay Weiss for the consult.      Nery Haile DO      Electronically signed by Nery Haile DO on 8/20/2021 at 6:18 AM

## 2021-08-20 NOTE — DISCHARGE INSTR - COC
Continuity of Care Form    Patient Name: Gala Lamas   :  1935  MRN:  [id number]    Admit date:  2021  Discharge date:  ***    Code Status Order: Full Code   Advance Directives:   Advance Care Flowsheet Documentation       Date/Time Healthcare Directive Type of Healthcare Directive Copy in [address] Agent's Name Healthcare Agent's Phone Number    [phone]  Yes, patient has an advance directive for healthcare treatment  Durable power of  for health care;Living will  Yes, copy in chart  Adult Children  Cristela Marsh  , son   and   Silvia Moore [phone]                                             Jewel Eagle  [phone]            Admitting Physician:  Jillian Jefferson MD  PCP: Ofelia Frazier DO    Discharging Nurse: [facility] Unit/Room#: 1328/4040-X  Discharging Unit Phone Number: ***    Emergency Contact:   Extended Emergency Contact Information  Primary Emergency Contact: [facility] Phone: [phone]  Relation: Child  Secondary Emergency Contact: Enzo Abbott/Lety Heard)   [address] Phone: [phone]  Mobile Phone: [phone]  Relation: Child    Past Surgical History:  Past Surgical History:   Procedure Laterality Date    ARTHROGRAPHY Left 2021    LEFT HIP INJECTION ARTHROGRAM performed by Jillian Jefferson MD at [facility] and [address] [place] [address] 9W Bilateral    [address] Right     hip    KNEE ARTHROPLASTY Left 2016    OTHER SURGICAL HISTORY Right 2013    hip arthrogram    PARATHYROID GLAND SURGERY  's    TOTAL HIP ARTHROPLASTY Left 2021    LEFT HIP TOTAL ARTHROPLASTY performed by Jillian Jefferson MD at [facility] Right 2/19/15    RIGHT TOTAL KNEE ARTHROPLASTY        Immunization History:   Immunization History   Administered Date(s) Administered    COVID-19, Pfizer, PF, 30mcg/0.3mL 01/27/2021, 02/17/2021       Active Problems:  Patient Active Problem List   Diagnosis Code    Primary osteoarthritis of right hip M16.11    Gout M10.9    Depression F32.9    Hyperlipidemia E78.5    HBP (high blood pressure) I10    Hypothyroid E03.9    H/O total hip arthroplasty (11-21-13: Hybrid R SHA)(Esequiel Lomeli MD) I81.895    CAD (coronary artery disease) I25.10    Primary osteoarthritis of right knee M17.11    S/P R total knee arthroplasty (2-19-15: Dr. Toby Laboy) H28.123    Primary osteoarthritis of left knee M17.12    S/P L total knee arthroplasty (7-19-16: Ann Berrios M.D.) S84.502    Diabetes mellitus [facility]) E11.9    Primary osteoarthritis of left hip M16.12       Isolation/Infection:   Isolation            No Isolation          Patient Infection Status       None to display            Nurse Assessment:  Last Vital Signs: BP (!) 146/59   Pulse 84   Temp 97.5 °F (36.4 °C) (Axillary)   Resp 16   Ht 5' 2\" (1.575 m)   Wt 200 lb (90.7 kg)   SpO2 98%   BMI 36.58 kg/m²     Last documented pain score (0-10 scale): Pain Level: 6  Last Weight:   Wt Readings from Last 1 Encounters:   08/19/21 200 lb (90.7 kg)     Mental Status:  oriented, alert, coherent, logical, thought processes intact and able to concentrate and follow conversation    IV Access:  - None    Nursing Mobility/ADLs:  Walking   Assisted  Transfer  Assisted  Bathing  Assisted  Dressing  Assisted  Toileting  Assisted  Feeding  [address]  Assisted  Med Delivery   whole    Wound Care Documentation and Therapy:        Elimination:  Continence:   · Bowel: Yes  · Bladder: Yes  Urinary Catheter: None   Colostomy/Ileostomy/Ileal Conduit: No       Date of Last BM: 8/18      Intake/Output Summary (Last 24 hours) at 8/20/2021 0738  Last data filed at 8/20/2021 0700  Gross per 24 hour   Intake 2000 ml   Output 850 ml   Net 1150 ml     I/O last 3 completed shifts:   In: 2000 [I.V.:2000]  Out: 850 [Urine:600; Blood:250]    Safety Concerns:     History of Falls (last 30 days) and At Risk for Falls    Impairments/Disabilities:      None    Nutrition Therapy:  Current Nutrition Therapy:   - Oral Diet:  General and Carb Control 3 carbs/meal (1500kcals/day)    Routes of Feeding: Oral  Liquids: No Restrictions  Daily Fluid Restriction: no  Last Modified Barium Swallow with Video (Video Swallowing Test): not done    Treatments at the Time of Hospital Discharge:   Respiratory Treatments: ***  Oxygen Therapy:  is not on home oxygen therapy.   Ventilator:    - No ventilator support    Rehab Therapies: Physical Therapy and Occupational Therapy  Weight Bearing Status/Restrictions: No weight bearing restirctions  Other Medical Equipment (for information only, NOT a DME order):  walker, bath bench and hospital bed  Other Treatments: ***    Patient's personal belongings (please select all that are sent with patient):  [place]    RN SIGNATURE:  Electronically signed by Vamshi Yoder RN on 8/20/21 at 3:34 PM EDT    CASE MANAGEMENT/SOCIAL WORK SECTION    Inpatient Status Date: ***    Readmission Risk Assessment Score:  Readmission Risk              Risk of Unplanned Readmission:  12           Discharging to Facility/ Agency   · Name: Greg Smith at [address]  · [address] [address]  Phone   Phone: [phone]         Fax: [phone]        ·     Dialysis Facility (if applicable)   · Name:  · Address:  · Dialysis Schedule:  · Phone:  · Fax:    / signature: {Esignature:[id number]:::0}    PHYSICIAN SECTION    Prognosis: {Prognosis:7006918263:::0}    Condition at Discharge: [facility] Patient Condition:538012310:::0}    Rehab Potential (if transferring to Rehab): {Prognosis:0945401440:::0}    Recommended Labs or Other Treatments After Discharge: ***    Physician Certification: I certify the above information and transfer of Sheldon Ness  is necessary for the continuing treatment of the diagnosis listed and that she requires {Admit to Appropriate Level of Care:55183:::0} for {GREATER/LESS:129719104} 30 days.      Update Admission H&P: {CHP DME Changes in HandP:643755351:::0}    PHYSICIAN SIGNATURE:  Electronically signed by NASREEN Jesus on 8/20/21 at 7:38 AM EDT

## 2021-08-20 NOTE — OP NOTE
[address] [address]                                OPERATIVE REPORT    PATIENT NAME: Sindi Nice                  :        1935  MED REC NO:   [id number]                            ROOM:  ACCOUNT NO:   [de-identified]                           ADMIT DATE: 2021  PROVIDER:     Jared Burdick MD    DATE OF PROCEDURE:  2021    PREOPERATIVE DIAGNOSIS:  Severe left hip osteoarthritis. POSTOPERATIVE DIAGNOSIS:  Severe left hip osteoarthritis. PROCEDURE PERFORMED:  Left total hip arthroplasty. SURGEON:  PASCUAL Ram Glow:  Sera Sanderson PA-C. No qualified surgical resident  available. ANESTHESIA:  Spinal.    ESTIMATED BLOOD LOSS:  250. SPECIMEN:  Bone, left femoral head. COMPLICATIONS:  None. CONDITION:  Stable to recovery room. IMPLANTS:  1.  A Biomet 50-mm G7 acetabular shell with a 36-mm neutral longevity  cross-linked polyethylene liner, size 14 Advocate cemented femoral stem  with standard offset and a 36-mm cobalt chrome femoral head, standard. 2.  Simplex with tobramycin bone cement x2 packages. INDICATION FOR PROCEDURE:  The patient is an 60-year-old woman with  severe left hip pain secondary to bone-on-bone osteoarthritis  unresponsive to conservative treatment. The risks, benefits,  alternatives, and limitations to elective hip arthroplasty were  discussed and informed consent obtained. DESCRIPTION OF PROCEDURE:  The patient met in the preoperative holding  area. The left hip was marked as the correct operative sites. She was  taken to the operating room, where spinal anesthesia was administered. Mchugh catheter placed. Intravenous antibiotics were given as  prophylaxis. She was positioned on her right side, secured on a  pegboard. An axillary roll was placed. All bony prominences well  padded.   The left lower extremity prepped and draped in the usual  sterile fashion. Following a surgical time-out, I accessed the left hip  through a posterolateral incision. Skin was incised sharply through the  adipose layer down to the level of the fascia. Fascia was split in line  with the incision. Charnley retractor was placed. The Piriformis and  posterior capsule were released as a continuous sleeve and tagged with  #1 Ethibond. Hip was then gently dislocated posteriorly. I reproduced  the femoral neck osteotomy above the lesser trochanter based on our  templating. Inspection of the femoral head revealed a large area of  full-thickness cartilage loss with eburnated bone through the superior  weightbearing portion of the femoral head. I then mobilized the femur  anteriorly, placed our acetabular retractors. The labrum and the  pulvinar were excised. I sequentially and gently reamed the socket into  a hemisphere up to size 49 mm. Size 50 G7 shell was impacted in 40  degrees of vertical opening and about 20 degrees of anteversion. There  was full seating of the cup with a firm press fit. The dome plug was  placed followed by our 36 neutral liner with an intact locking  mechanism. I then turned our attention to the femur. I gained access  to the canal with an awl. I used a tapered reamer and bur for  appropriate lateralization. She demonstrated a diminished bone density  and given her body habitus, I elected for cemented fixation consistent  with her contralateral side. The hip was then broached in native  anteversion up to a size 14, at which time we had good canal fill. Trial reduction with the standard offset neck produced a stable hip in  all planes with no evidence of bony or component impingement. Leg-lengths were restored. Trials were removed. The cement restrictor  was placed into the diaphysis. The canal was then copiously lavaged  with high-pressure pulsatile irrigation using a long-tipped .    Dry sponge was then placed into the canal.  On the back table, we  assembled our components. I mixed two packs of antibiotic bone cement  using third generation technique. The canal was then injected with the  cement and appropriately pressurized. The size 14 stem with a 12-mm  distal centralizer was inserted through the center of the cement mantle  and held down against the osteotomy resection in native anteversion  until the cement had fully dried. Final trial reduction confirmed the  36 standard head. This was impacted over a clean and dry trunnion. The  hip was then reduced with stability confirmed in all planes. I then  lavaged the hip with dilute 1 liter of Betadine solution followed by  saline. One gram of vancomycin powder placed in the joint followed by  closure of the capsule with #1 Ethibond. Cocktail injection was given  throughout the muscular and the capsular layer for postoperative pain  control. The fascial and adipose layers were closed with a running #1  Stratafix. The skin was closed in layers followed by application of a  sterile dressing and hip abduction pillow. The patient tolerated the  procedure well without intraoperative complications. At the conclusion  of the case, all sponge and needle counts were correct. She was  transferred from the operating room table onto her hospital bed and  transported to recovery room in stable condition. Of note, physician's  assistant was present throughout the entirety of the case. His presence  was necessary to aid with patient positioning, draping, limb  positioning, wound closure, application of surgical dressing, and  patient transport from the operating room table. No qualified surgical  resident available.         Kamilah Ontiveros MD    D: 08/19/2021 13:06:24       T: 08/19/2021 13:09:29     [email or web address]  Job#: [id number]     Doc#: [id number]    CC:

## 2021-08-20 NOTE — PROGRESS NOTES
Physical Therapy    Facility/Department: [address] ORTHO SURGERY  Treatment Note    NAME: Italia Wheeler  : 1935  MRN: [id number]    Date of Service: 2021               Patient Diagnosis(es): The encounter diagnosis was Primary osteoarthritis of left hip.     has a past medical history of Arthritis, CAD (coronary artery disease), Depression, Diabetes mellitus ([facility] [address] H/O cardiovascular stress test, Heartburn, History of kidney stones, Hx of blood clots, Hyperlipidemia, Hypertension, Left hip pain, Myocardial infarct [facility]), Thyroid disease, Use of cane as ambulatory aid, and Vitamin D deficiency. has a past surgical history that includes Hysterectomy; Parathyroid gland surgery (5332'M); other surgical history (Right, 2013); Cardiac catheterization ( and ); Total knee arthroplasty (Right, 2/19/15); Knee Arthroplasty (Left, 2016); Cataract removal with implant (Bilateral); arthrography (Left, 2021); joint replacement (Right, ); and Total hip arthroplasty (Left, 2021). Evaluating Therapist: Lennox Moros, PT     rec ww     Referring Provider:  Dr. Markie Bishop     PT order :  PT eval and treat     Room #: 517   DIAGNOSIS:  OA s/p L SHA 2021     PRECAUTIONS: falls, posterolateral hip precautions, FWBAT      Social:  Pt lives alone  in a  1  floor plan  1+1  steps to enter. Prior to admission pt walked with  [employer]      Initial Evaluation  Date:  2021  Treatment Date: 2021 PM      Short Term/ Long Term   Goals   Was pt agreeable to Eval/treatment?  yes  Yes    Does pt have pain? L hip pain and  nausea  Reported L hip pain, did not quantify. Bed Mobility  Rolling: NT   Supine to sit:  Mod assist  Sit to supine: Mod assist   Scooting: Mod assist  Rolling: NT  Supine to sit: NT  Sit to supine: NT  Scooting: Mod A  SBA    Transfers Sit to stand:   Mod assist   Stand to sit: mod assist   Stand pivot:  NT  NT  SBA    Ambulation     4 feet side stepping with ww   with min assist  NT, pt not willing  150  feet with  ww  with SBA        Stair negotiation: ascended and descended NT  NT  4 steps with B  rail with  CGA    LE ROM  Decreased throughout L hip, distally WFL      LE strength  2- to 3-/ 5 L hip, 3+ to 4-/ 5 distally   3-/ 5 L hip    AM- PAC RAW score  11/ 24 12/24      Pt is alert and Oriented x  3. Some mild confusion noted. Poor compliance to hip precautions. Balance:  Min assist . High fall risk due to weakness     Endurance: poor        ASSESSMENT  Pt displays functional ability as noted in the objective portion of this evaluation. Conditions Requiring Skilled Therapeutic Intervention:    [x]Decreased strength     [x]Decreased ROM  [x]Decreased functional mobility  [x]Decreased balance   [x]Decreased endurance   [x]Decreased posture  []Decreased sensation  [x]Decreased coordination   []Decreased vision  [x]Decreased safety awareness   [x]Increased pain         Treatment/Education:    Hip precautions  Bed mobility  Exercises: Supine: LLE: GS/QS 10x2, HS 10x2, AA hip ABD/ADD 10x2, APs 20x    Pt educated on fall risk, safe and proper technique with mobility, LE exercises, hip precautions        Patient response to education:   Pt verbalized understanding Pt demonstrated skill Pt requires further education in this area    x  with cues/assist   x       Comments:  Pt found in semi [employer] and reported going to the nursing home for rehab. Pt continued to have confusion and provided answers not pertinent to questions asked. Able to follow commands 50% of the time. Pt not able to recite hip precautions without cues. Pt not willing to get up out of bed as she reported fatigue but agreeable to exercise. Performed exercises as noted above. Pt positioned to the [facility] following exercise and abductor pillow placed due to inability to recall precautions. Call light and tray table in reach.        PLAN  Patient is making steady progress toward established physical therapy goals. Continue with current plan of care. PLAN OF CARE:    Current Treatment Recommendations     [x] Strengthening to improve independence with functional mobility   [x] ROM to improve independence with functional mobility   [x] Balance Training to improve static/dynamic balance and to reduce fall risk  [x] Endurance Training to improve activity tolerance during functional mobility   [x] Transfer Training to improve safety and independence with all functional transfers   [x] Gait Training to improve gait mechanics, endurance and assess need for appropriate assistive device  [x] Stair Training in preparation for safe discharge home and/or into the community   [x] Positioning to prevent skin breakdown and contractures  [x] Safety and Education Training   [x] Patient/Caregiver Education   [x] HEP  [] Other     Frequency of treatments will be BID x 2-3 days.     Time in: 1415    Time out:  1430    CPT codes:  [] Low Complexity PT evaluation 72645  [] Moderate Complexity PT evaluation 69081  [] High Complexity PT evaluation 50864  [] PT Re-evaluation 99588  [] Gait training 63135  minutes  [] Therapeutic activities 67409  minutes  [x] Therapeutic exercises 58408 15 minutes  [] Neuromuscular reeducation 90423  minutes       Josiane Cherry, PT, DPT  License [id number]

## 2021-08-20 NOTE — PROGRESS NOTES
Occupational Therapy  OT BEDSIDE TREATMENT NOTE      Date:2021  Patient Name: Andi Kayser  MRN: [id number]  : 1935  Room: [address] [place], [place] [address]     Evaluating OT: Jossy Jaffe, OTR/L - [id number]     Referring Provider: Briana Roberts MD  Specific Provider Orders/Date: \"OT eval and treat\" - 2021     Diagnosis: Primary osteoarthritis of left hip [M16.12]    Surgery: Patient underwent L SHA on 2021. Pertinent Medical History: h/o R SHA (per patient report), CAD, DM, HTN, OA  Past Medical History        Past Medical History:   Diagnosis Date    Arthritis       osteo    CAD (coronary artery disease)      follows with PCP    Depression      Diabetes mellitus ([facility] [address]       borderline     H/O cardiovascular stress test 2016     report on chart    Heartburn      History of kidney stones      Hx of blood clots      h/o    Hyperlipidemia      Hypertension      Left hip pain 2021    Myocardial infarct ([facility] [address]      3 mild episodes    Thyroid disease      Use of cane as ambulatory aid      Vitamin D deficiency           Past Surgical History         Past Surgical History:   Procedure Laterality Date    ARTHROGRAPHY Left 2021     LEFT HIP INJECTION ARTHROGRAM performed by Briana Roberts MD at [facility] Bilateral      HYSTERECTOMY        JOINT REPLACEMENT Right      hip    KNEE ARTHROPLASTY Left 2016    OTHER SURGICAL HISTORY Right 2013     hip arthrogram    PARATHYROID GLAND SURGERY   's    TOTAL HIP ARTHROPLASTY Left 2021     LEFT HIP TOTAL ARTHROPLASTY performed by Briana Roberts MD at [address] [place] [address] Right 2/19/15     RIGHT TOTAL KNEE ARTHROPLASTY             Precautions: fall risk, posterolateral hip precautions, FWBAT     Assessment of Current Deficits:    [x]? Functional mobility             [x]?ADLs           [x]?  Strength [x]?Cognition   [x]? Functional transfers           [x]? IADLs         [x]? Safety Awareness   [x]? Endurance   []? Fine Coordination              [x]? Balance      []? Vision/perception   [x]? Sensation     []? Gross Motor Coordination  []? ROM           []? Delirium                   []? Motor Control      OT PLAN OF CARE   OT POC is based on physician orders, patient diagnosis, and results of clinical assessment. Frequency/Duration 2-5 days/week for 2 weeks PRN   Specific OT Treatment Interventions to Include:   * Instruction/training on adapted ADL techniques and AE recommendations to increase functional independence within precautions       * Training on energy conservation strategies, correct breathing pattern and techniques to improve independence/tolerance for self-care routine  * Functional transfer/mobility training/DME recommendations for increased independence, safety, and fall prevention  * Patient/Family education to increase follow through with safety techniques and functional independence  * Recommendation of environmental modifications for increased safety with functional transfers/mobility and ADLs  * Therapeutic exercise to improve motor endurance, ROM, and functional strength for ADLs/functional transfers  * Therapeutic activities to facilitate/challenge dynamic balance, stand tolerance for increased safety and independence with ADLs  * Neuro-muscular re-education: facilitation of righting/equilibrium reactions, midline orientation, scapular stability/mobility, normalization of muscle tone, and facilitation of volitional active controled movement     Recommended Adaptive Equipment: TBD     Home Living: Patient lives alone in a one-floor home; laundry is in the basement.   Bathroom Setup: tub shower and walk-in shower (with seat available) - no grab bars; elevated toilet seat  Equipment Owned: cane, walker     Prior Level of Function (PLOF): Patient reported that she was independent with ADLs, IADLs, and functional mobility (with cane recently). Patient stated that her daughter-in-law (who lives in [place]) will stay with her for about a week upon patient's return home.     Pain Level: L hip- Moderate  Cognition: Patient alert and oriented with intermittent confusion exhibited during td     Functional Assessment:                  AM-PAC Daily Activity Raw Score: 15/24    Initial Eval Status  Date: 8/19/2021 Treatment Status  Date: 8/20/21 Short Term Goals = Long Term Goals   Feeding Setup       Grooming Min A S/U  Mod I  (seated/standing at sink)   UB Dressing Min A  Min A  To doff/ don gown SBA   LB Dressing Max A Mod A  Pt doffed/ donned socks using reacher/ sock aid. Pt demonstrated difficulty donning socks on sock aid due to decreased strength  SBA while demonstrating Good adherence to posterolateral hip precautions and Good understanding AE use. Bathing Max A  UE: S/U  LE: Max A SBA while demonstrating Good adherence to posterolateral hip precautions and Good understanding AE/DME use. Toileting Max A   Min A   Bed Mobility  Supine-to-Sit: Mod A Supine to sit: Mod A      Functional Transfers Sit-to-Stand: Mod A   from EOB  Cues needed to maximize safety.  STS: Min A  From EOB and chair SBA   Functional Mobility Min A   (with walker) for few side steps toward HOB. Limited secondary to nausea and dizziness.  Min A  Using ww in room Supervision with functional mobility (with device, as needed/appropriate) in order to maximize independence with ADLs/IADLs and other functional tasks. Balance Sitting: Good-  (at EOB)  Standing: Fair-  (with walker) Sitting: Independent  Standing: Min A Fair+ dynamic standing balance during completion of ADLs/IADLs and other functional tasks.    Activity Tolerance Limited secondary to dizziness and nausea; nursing aware.  Poor+ Patient will demonstrate Good understanding and consistent implementation of energy conservation techniques and work simplification techniques into ADL/IADL routines. Visual/  Perceptual WFL      N/A          Treatment: Upon arrival pt was supine in bed. Extended time required during all tasks due to fatigue. Pt unable to recall hip precautons therefore precautions were reviewed with pt. Mod vc required to adhere to hip precautions during LE dressing. Pt displayed fair return demonstration for use of LE AE.  AE not issued due to SNF planned at D/C. At end of session pt was transferred to chair with all lines and tubes intact and call light within reach. Education: transfer training, hip precautions, safety training, AD management and AE training    · Pt has made good progress towards set goals.        Treatment Charges: Mins Units   Ther Ex  51910     Manual Therapy Parva Vinicio [id number] 10 1   ADL/Home Mgt 85327 15 1   Neuro Re-ed 17096     Group Therapy      Orthotic manage/training  81895     Non-Billable Time       Time In: 9:00  Time Out: 9:25  Total Time: [address] MARQUEZ/L [id number]

## 2021-08-20 NOTE — PROGRESS NOTES
Physical Therapy    Facility/Department: [address] ORTHO SURGERY  Treatment Note    NAME: Andi Kayser  : 1935  MRN: [id number]    Date of Service: 2021               Patient Diagnosis(es): The encounter diagnosis was Primary osteoarthritis of left hip.     has a past medical history of Arthritis, CAD (coronary artery disease), Depression, Diabetes mellitus ([facility] [address] H/O cardiovascular stress test, Heartburn, History of kidney stones, Hx of blood clots, Hyperlipidemia, Hypertension, Left hip pain, Myocardial infarct [facility]), Thyroid disease, Use of cane as ambulatory aid, and Vitamin D deficiency. has a past surgical history that includes Hysterectomy; Parathyroid gland surgery (7202'H); other surgical history (Right, 2013); Cardiac catheterization ( and ); Total knee arthroplasty (Right, 2/19/15); Knee Arthroplasty (Left, 2016); Cataract removal with implant (Bilateral); arthrography (Left, 2021); joint replacement (Right, ); and Total hip arthroplasty (Left, 2021). Evaluating Therapist: Flores Pritchett, PT     rec ww     Referring Provider:  Dr. Ever Weiner     PT order :  PT eval and treat     Room #: 086   DIAGNOSIS:  OA s/p L SHA 2021     PRECAUTIONS: falls, posterolateral hip precautions, FWBAT      Social:  Pt lives alone  in a  1  floor plan  1+1  steps to enter. Prior to admission pt walked with  [email or web address]      Initial Evaluation  Date:  2021  Treatment Date: 2021 AM      Short Term/ Long Term   Goals   Was pt agreeable to Eval/treatment?  yes  Yes    Does pt have pain? L hip pain and  nausea  Reported L hip pain, did not quantify. Bed Mobility  Rolling: NT   Supine to sit:  Mod assist  Sit to supine: Mod assist   Scooting: Mod assist  Rolling: NT  Supine to sit: Mod A  Sit to supine: Mod A  Scooting: Mod A  SBA    Transfers Sit to stand: Mod assist   Stand to sit: mod assist   Stand pivot:  NT  Sit to stand:  Mod assist   Stand to sit: mod assist Stand pivot:  NT  SBA    Ambulation     4 feet side stepping with ww   with min assist  20 feet x 2 with [facility] with Min A  150  feet with  ww  with SBA        Stair negotiation: ascended and descended NT  NT  4 steps with B  rail with  CGA    LE ROM  Decreased throughout L hip, distally WFL      LE strength  2- to 3-/ 5 L hip, 3+ to 4-/ 5 distally   3-/ 5 L hip    AM- PAC RAW score  11/ 24 12/24      Pt is alert and Oriented x  3. Some mild confusion noted. Poor compliance to hip precautions. Balance:  Min assist . High fall risk due to weakness     Endurance: poor        ASSESSMENT  Pt displays functional ability as noted in the objective portion of this evaluation. Conditions Requiring Skilled Therapeutic Intervention:    [x]Decreased strength     [x]Decreased ROM  [x]Decreased functional mobility  [x]Decreased balance   [x]Decreased endurance   [x]Decreased posture  []Decreased sensation  [x]Decreased coordination   []Decreased vision  [x]Decreased safety awareness   [x]Increased pain         Treatment/Education:    Hip precautions  Gait training  Bed mobility  Transfer training      Pt educated on fall risk, safe and proper technique with mobility, LE exercises, hip precautions        Patient response to education:   Pt verbalized understanding Pt demonstrated skill Pt requires further education in this area    x  with cues/assist   x       Comments:  Approached by staff as patient asking to go to the restroom. Pt instructed in hip precautions prior to mobility and acknowledged understanding with poor carryover. Pt required assist with LLE to the EOB and with trunk righting. Pt denied dizziness with positional change. Pt with slow gait speed, forward flexed posture, and short stride length. Patient assisted to seated on the commode and was leaning forward on the walker and educated on hip flexion precautions with poor carryover.  Pt instructed with safe technique for hygiene as she was bending forward to complete task. Patient unable to ambulate farther and ambulated back to the bedside and assisted to seated in the bedside chair following bathroom use. Pt performed LAQs, APs while seated. Call light and tray table in reach. PLAN  Patient is making steady progress toward established physical therapy goals. Continue with current plan of care. PLAN OF CARE:    Current Treatment Recommendations     [x] Strengthening to improve independence with functional mobility   [x] ROM to improve independence with functional mobility   [x] Balance Training to improve static/dynamic balance and to reduce fall risk  [x] Endurance Training to improve activity tolerance during functional mobility   [x] Transfer Training to improve safety and independence with all functional transfers   [x] Gait Training to improve gait mechanics, endurance and assess need for appropriate assistive device  [x] Stair Training in preparation for safe discharge home and/or into the community   [x] Positioning to prevent skin breakdown and contractures  [x] Safety and Education Training   [x] Patient/Caregiver Education   [x] HEP  [] Other     Frequency of treatments will be BID x 2-3 days.     Time in: 0830    Time out:  0845    CPT codes:  [] Low Complexity PT evaluation 52342  [] Moderate Complexity PT evaluation 30546  [] High Complexity PT evaluation 82963  [] PT Re-evaluation 37164  [] Gait training 22783  minutes  [x] Therapeutic activities 46594 15 minutes  [] Therapeutic exercises 89080  minutes  [] Neuromuscular reeducation 56822  minutes       Betina Gloria, PT, DPT  License [id number]

## 2021-08-20 NOTE — PROGRESS NOTES
Orthopedics progress Note    Post op Day 1      S:   Complaints: No issues overnight. Alert and oriented. Resting comfortably in bed today. Tolerating diet. Pain controlled. O:     Vitals:    08/20/21 0317   BP: (!) 146/59   Pulse: 84   Resp: 16   Temp: 97.5 °F (36.4 °C)   SpO2: 98%        Dressing clean/dry/intact   Femoral/Sciatic intact   Calf - soft     H/H:   Recent Labs     08/20/21  0250   HGB 11.1*   HCT 35.8   Creatinine 1.3     PT/INR: No results for input(s): PROT, INR in the last 72 hours. A/P:   Status post left total hip arthroplasty doing well. Initiate PT/OT today. Discharge planning for subacute rehab due to no one at home with her. Continue with anticoagulation as prescribed.   Follow-up Dr. Markie Bishop in 2 weeks        Electronically signed by NASREEN Cruz on 8/20/2021 at 7:18 AM

## 2021-08-20 NOTE — PLAN OF CARE
Problem: Skin Integrity:  Goal: Will show no infection signs and symptoms  Description: Will show no infection signs and symptoms  Outcome: Ongoing  Goal: Absence of new skin breakdown  Description: Absence of new skin breakdown  Outcome: Ongoing     Problem: Falls - Risk of:  Goal: Will remain free from falls  Description: Will remain free from falls  Outcome: Ongoing  Goal: Absence of physical injury  Description: Absence of physical injury  Outcome: Ongoing     Problem: Discharge Planning:  Goal: Knowledge of discharge instructions  Description: Knowledge of discharge instructions  Outcome: Ongoing     Problem: Infection - Surgical Site:  Goal: Signs of wound healing will improve  Description: Signs of wound healing will improve  Outcome: Ongoing     Problem: Mobility - Impaired:  Goal: Achieve maximum mobility level  Description: Achieve maximum mobility level  Outcome: Ongoing     Problem: Pain - Acute:  Goal: Pain level will decrease  Description: Pain level will decrease  Outcome: Ongoing     Problem: Skin Integrity:  Goal: Will show no infection signs and symptoms  Description: Will show no infection signs and symptoms  Outcome: Ongoing  Goal: Absence of new skin breakdown  Description: Absence of new skin breakdown  Outcome: Ongoing     Problem: Falls - Risk of:  Goal: Will remain free from falls  Description: Will remain free from falls  Outcome: Ongoing  Goal: Absence of physical injury  Description: Absence of physical injury  Outcome: Ongoing     Problem: Discharge Planning:  Goal: Knowledge of discharge instructions  Description: Knowledge of discharge instructions  Outcome: Ongoing     Problem: Infection - Surgical Site:  Goal: Signs of wound healing will improve  Description: Signs of wound healing will improve  Outcome: Ongoing     Problem: Mobility - Impaired:  Goal: Achieve maximum mobility level  Description: Achieve maximum mobility level  Outcome: Ongoing     Problem: Pain - Acute:  Goal: Pain level will decrease  Description: Pain level will decrease  Outcome: Ongoing     Problem: Skin Integrity:  Goal: Will show no infection signs and symptoms  Description: Will show no infection signs and symptoms  Outcome: Ongoing  Goal: Absence of new skin breakdown  Description: Absence of new skin breakdown  Outcome: Ongoing     Problem: Falls - Risk of:  Goal: Will remain free from falls  Description: Will remain free from falls  Outcome: Ongoing  Goal: Absence of physical injury  Description: Absence of physical injury  Outcome: Ongoing     Problem: Discharge Planning:  Goal: Knowledge of discharge instructions  Description: Knowledge of discharge instructions  Outcome: Ongoing     Problem: Infection - Surgical Site:  Goal: Signs of wound healing will improve  Description: Signs of wound healing will improve  Outcome: Ongoing     Problem: Mobility - Impaired:  Goal: Achieve maximum mobility level  Description: Achieve maximum mobility level  Outcome: Ongoing     Problem: Pain - Acute:  Goal: Pain level will decrease  Description: Pain level will decrease  Outcome: Ongoing

## 2021-08-20 NOTE — PLAN OF CARE
Problem: Skin Integrity:  Goal: Will show no infection signs and symptoms  Description: Will show no infection signs and symptoms  Outcome: Ongoing  Goal: Absence of new skin breakdown  Description: Absence of new skin breakdown  Outcome: Ongoing     Problem: Falls - Risk of:  Goal: Will remain free from falls  Description: Will remain free from falls  Outcome: Ongoing  Goal: Absence of physical injury  Description: Absence of physical injury  Outcome: Ongoing     Problem: Discharge Planning:  Goal: Knowledge of discharge instructions  Description: Knowledge of discharge instructions  Outcome: Ongoing     Problem: Infection - Surgical Site:  Goal: Signs of wound healing will improve  Description: Signs of wound healing will improve  Outcome: Ongoing     Problem: Mobility - Impaired:  Goal: Achieve maximum mobility level  Description: Achieve maximum mobility level  Outcome: Ongoing     Problem: Pain - Acute:  Goal: Pain level will decrease  Description: Pain level will decrease  Outcome: Ongoing

## 2021-08-20 NOTE — PROGRESS NOTES
Nurse to nurse called to Patt Saenz at [place] papers and prescriptions faxed to facility per request

## 2021-08-21 ENCOUNTER — APPOINTMENT (OUTPATIENT)
Dept: GENERAL RADIOLOGY | Age: 86
End: 2021-08-21
Payer: MEDICARE

## 2021-08-21 ENCOUNTER — APPOINTMENT (OUTPATIENT)
Dept: CT IMAGING | Age: 86
End: 2021-08-21
Payer: MEDICARE

## 2021-08-21 ENCOUNTER — HOSPITAL ENCOUNTER (EMERGENCY)
Age: 86
Discharge: HOME | End: 2021-08-22
Attending: EMERGENCY MEDICINE
Payer: MEDICARE

## 2021-08-21 DIAGNOSIS — S09.90XA CLOSED HEAD INJURY, INITIAL ENCOUNTER: ICD-10-CM
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
## 2021-08-21 PROCEDURE — 99284 EMERGENCY DEPT VISIT MOD MDM: CPT

## 2021-08-21 PROCEDURE — 72125 CT NECK SPINE W/O DYE: CPT

## 2021-08-21 PROCEDURE — 73521 X-RAY EXAM HIPS BI 2 VIEWS: CPT

## 2021-08-21 PROCEDURE — 70450 CT HEAD/BRAIN W/O DYE: CPT

## 2021-08-21 NOTE — ED NOTES
Bed:  HA  Expected date: 8/21/21  Expected time:   Means of arrival: [facility]  Comments:  Shereen Siddiqi, [address]  08/21/21 0326

## 2021-08-21 NOTE — ED PROVIDER NOTES
HPI:  8/21/21, Time: 5:08 PM EDT         Lolita Bass is a 82 y.o. female presenting to the ED for fall at nursing home, beginning 1 day ago. The complaint has been persistent, moderate in severity, and worsened by nothing. Patient had 2 falls in the nursing home one last night and one this morning she did have her hip replaced on the left 2 days ago. She is therefore physical therapy rehab after hip replacement. She states that she rings a bell in order to have someone coming out of the bathroom however she continues in ring and nobody comes for a prolonged time so she decided to get up on her own and that is when she fell. patient mitts to left hip pain no worse than normal.. Patient denies fever/chills, sore throat, cough, congestion, chest pain, shortness of breath, edema, headache, visual disturbances, focal paresthesias, focal weakness, abdominal pain, nausea, vomiting, diarrhea, constipation, dysuria, hematuria, trauma, neck or back pain, rash or other complaints. ROS:   A complete review of systems was performed and all pertinent positives and negatives are stated within HPI, all other systems reviewed and are negative.      --------------------------------------------- PAST HISTORY ---------------------------------------------  Past Medical History:  has a past medical history of Arthritis, CAD (coronary artery disease), Depression, Diabetes mellitus ([facility] [address] H/O cardiovascular stress test, Heartburn, History of kidney stones, Hx of blood clots, Hyperlipidemia, Hypertension, Left hip pain, Myocardial infarct [facility]), Thyroid disease, Use of cane as ambulatory aid, and Vitamin D deficiency. Past Surgical History:  has a past surgical history that includes Hysterectomy; Parathyroid gland surgery (1206'G); other surgical history (Right, 5/23/2013); Cardiac catheterization (1997 and 1998); Total knee arthroplasty (Right, 2/19/15); Knee Arthroplasty (Left, 07/19/2016);  Cataract removal with implant (Bilateral); arthrography (Left, 5/20/2021); joint replacement (Right, 2013); and Total hip arthroplasty (Left, 8/19/2021). Social History:  reports that she quit smoking about 47 years ago. She has never used smokeless tobacco. She reports previous alcohol use. She reports that she does not use drugs. Family History: family history is not on file. The patients home medications have been reviewed. Allergies: Patient has no known allergies. ----------------------------------------PHYSICAL EXAM--------------------------------------  Constitutional:  Well developed, well nourished, no acute distress, non-toxic appearance   Eyes:  PERRL, conjunctiva normal, EOMI  HENT:  Atraumatic, external ears normal, nose normal, oropharynx moist, no pharyngeal exudates. Neck- normal range of motion, no nuchal rigidity   Respiratory:  No respiratory distress, normal breath sounds, no rales, no wheezing   Cardiovascular:  Normal rate, normal rhythm, no murmurs, no gallops, no rubs. Radial and DP pulses 2+ bilaterally. GI:  Soft, nondistended, normal bowel sounds, nontender, no organomegaly, no mass, no rebound, no guarding   :  No costovertebral angle tenderness   Musculoskeletal:  No edema, left anterior hip tenderness, no deformities. Back- no tenderness  Integument:  Well hydrated, no rash. Adequate perfusion. Lymphatic:  No cervical lymphadenopathy noted   Neurologic:  Alert & oriented x 3, CN 2-12 normal, normal motor function, normal sensory function, no focal deficits noted. Normal gait. Psychiatric:  Speech and behavior appropriate       -------------------------------------------------- RESULTS -------------------------------------------------  I have personally reviewed all laboratory and imaging results for this patient. Results are listed below. LABS:  No results found for this visit on 08/21/21.     RADIOLOGY:  Interpreted by Radiologist.  CT Head WO Contrast   Final Result   No acute intracranial abnormality. There is age-appropriate atrophy and small-vessel ischemic disease. CT cervical spine. There is no acute fracture or dislocation in the cervical spine. There is   diffuse degenerative changes from C3-T1 with osteophytes and multilevel disc   bulges. The prevertebral soft tissues are normal.      Impression      No acute fracture or dislocation in the cervical spine. Diffuse degenerative changes with multilevel disc bulges from C3-T1. CT Cervical Spine WO Contrast   Final Result   No acute intracranial abnormality. There is age-appropriate atrophy and small-vessel ischemic disease. CT cervical spine. There is no acute fracture or dislocation in the cervical spine. There is   diffuse degenerative changes from C3-T1 with osteophytes and multilevel disc   bulges. The prevertebral soft tissues are normal.      Impression      No acute fracture or dislocation in the cervical spine. Diffuse degenerative changes with multilevel disc bulges from C3-T1. XR HIP BILATERAL W AP PELVIS (2 VIEWS)   Final Result   No evidence of acute pelvic or hip fracture. Evaluation limited by technique and patient's large body habitus.                     ------------------------- NURSING NOTES AND VITALS REVIEWED ---------------------------  The nursing notes within the ED encounter and vital signs as below have been reviewed by myself. BP (!) 119/49   Pulse 90   Temp 98.7 °F (37.1 °C) (Temporal)   Resp 16   SpO2 96%   Oxygen Saturation Interpretation: Normal      The patients available past medical records and past encounters were reviewed.         ------------------------------ ED COURSE/MEDICAL DECISION MAKING----------------------  Medications - No data to display       MEDICATIONS  New Prescriptions    No medications on file           Medical Decision Making:    Patient presented to the emergency department for a fall that occurred at the nursing home 1 time earlier today and one time yesterday. She is currently there for rehabilitation after hip replacement on the left. She states that she fell because she supposed a week till she gets up with help however when she rings her belly and they do not come in instead of urinating herself she get tries to get up on her own to go to the bathroom. Patient presented due to multiple falls. X-ray of the left hip showed no acute changes or abnormalities x-ray right hip was also conducted which also within normal limits. Patient received CT head and neck which found some chronic change without any acute abnormalities. Patient was explicitly instructed on specific signs and symptoms on which to return to the emergency room for. Patient was instructed to return to the ER for any new or worsening symptoms. Additional discharge instructions were given verbally. All questions were answered. Patient is comfortable and agreeable with discharge plan. Patient in no acute distress and non-toxic in appearance. This patient's ED course included: a personal history and physicial examination, re-evaluation prior to disposition, multiple bedside re-evaluations, IV medications, cardiac monitoring, continuous pulse oximetry, complex medical decision making and emergency management and a personal history and physicial eaxmination    This patient has remained hemodynamically stable and been closely monitored during their ED course. Re-Evaluations:  Time: 1800   Re-evaluation. Patients symptoms show no change  Repeat physical examination is not changed      Counseling: The emergency provider has spoken with the patient and discussed todays results, in addition to providing specific details for the plan of care and counseling regarding the diagnosis and prognosis.   Questions are answered at this time and they are agreeable with the plan.    --------------------------- IMPRESSION AND DISPOSITION

## 2021-08-22 VITALS
HEART RATE: 75 BPM | DIASTOLIC BLOOD PRESSURE: 53 MMHG | TEMPERATURE: 98.7 F | RESPIRATION RATE: 20 BRPM | SYSTOLIC BLOOD PRESSURE: 110 MMHG | OXYGEN SATURATION: 96 %

## 2021-08-22 NOTE — ED NOTES
Spoke with PAS reports approx 1 hour and 45 minutes for return transport     Jocelyn Frankel RN  08/21/21 7104

## 2023-03-08 ENCOUNTER — APPOINTMENT (OUTPATIENT)
Dept: CT IMAGING | Age: 88
DRG: 871 | End: 2023-03-08
Payer: MEDICARE

## 2023-03-08 ENCOUNTER — APPOINTMENT (OUTPATIENT)
Dept: GENERAL RADIOLOGY | Age: 88
DRG: 871 | End: 2023-03-08
Payer: MEDICARE

## 2023-03-08 ENCOUNTER — HOSPITAL ENCOUNTER (INPATIENT)
Age: 88
LOS: 5 days | Discharge: NURSING, REHAB OR LONG-TERM CARE | DRG: 871 | End: 2023-03-13
Attending: EMERGENCY MEDICINE | Admitting: INTERNAL MEDICINE
Payer: MEDICARE

## 2023-03-08 DIAGNOSIS — J96.01 ACUTE HYPOXEMIC RESPIRATORY FAILURE (HCC): ICD-10-CM

## 2023-03-08 DIAGNOSIS — E86.0 DEHYDRATION: ICD-10-CM

## 2023-03-08 DIAGNOSIS — N17.9 AKI (ACUTE KIDNEY INJURY) (HCC): ICD-10-CM

## 2023-03-08 DIAGNOSIS — R11.2 NAUSEA AND VOMITING, UNSPECIFIED VOMITING TYPE: ICD-10-CM

## 2023-03-08 DIAGNOSIS — N12 PYELONEPHRITIS: Primary | ICD-10-CM

## 2023-03-08 DIAGNOSIS — A41.9 SEPTICEMIA (HCC): ICD-10-CM

## 2023-03-08 LAB
ACETAMINOPHEN LEVEL: <5 MCG/ML (ref 10–30)
ALBUMIN SERPL-MCNC: 3.5 G/DL (ref 3.5–5.2)
ALP BLD-CCNC: 76 U/L (ref 35–104)
ALT SERPL-CCNC: 7 U/L (ref 0–32)
ANION GAP SERPL CALCULATED.3IONS-SCNC: 13 MMOL/L (ref 7–16)
AST SERPL-CCNC: 12 U/L (ref 0–31)
BACTERIA: ABNORMAL /HPF
BASOPHILS ABSOLUTE: 0.05 E9/L (ref 0–0.2)
BASOPHILS RELATIVE PERCENT: 0.6 % (ref 0–2)
BILIRUB SERPL-MCNC: 1.9 MG/DL (ref 0–1.2)
BILIRUBIN DIRECT: 0.9 MG/DL (ref 0–0.3)
BILIRUBIN URINE: NEGATIVE
BILIRUBIN, INDIRECT: 1 MG/DL (ref 0–1)
BLOOD, URINE: ABNORMAL
BUN BLDV-MCNC: 20 MG/DL (ref 6–23)
BURR CELLS: ABNORMAL
CALCIUM SERPL-MCNC: 9.3 MG/DL (ref 8.6–10.2)
CHLORIDE BLD-SCNC: 102 MMOL/L (ref 98–107)
CLARITY: ABNORMAL
CO2: 20 MMOL/L (ref 22–29)
COLOR: YELLOW
CREAT SERPL-MCNC: 1.2 MG/DL (ref 0.5–1)
EKG ATRIAL RATE: 90 BPM
EKG P AXIS: 17 DEGREES
EKG P-R INTERVAL: 136 MS
EKG Q-T INTERVAL: 384 MS
EKG QRS DURATION: 90 MS
EKG QTC CALCULATION (BAZETT): 469 MS
EKG R AXIS: -12 DEGREES
EKG T AXIS: 69 DEGREES
EKG VENTRICULAR RATE: 90 BPM
EOSINOPHILS ABSOLUTE: 0.01 E9/L (ref 0.05–0.5)
EOSINOPHILS RELATIVE PERCENT: 0.1 % (ref 0–6)
EPITHELIAL CELLS, UA: ABNORMAL /HPF
ETHANOL: <10 MG/DL (ref 0–0.08)
GFR SERPL CREATININE-BSD FRML MDRD: 44 ML/MIN/1.73
GLUCOSE BLD-MCNC: 252 MG/DL (ref 74–99)
GLUCOSE URINE: NEGATIVE MG/DL
HCT VFR BLD CALC: 40.9 % (ref 34–48)
HEMOGLOBIN: 12.9 G/DL (ref 11.5–15.5)
IMMATURE GRANULOCYTES #: 0.05 E9/L
IMMATURE GRANULOCYTES %: 0.6 % (ref 0–5)
INFLUENZA A BY PCR: NOT DETECTED
INFLUENZA B BY PCR: NOT DETECTED
KETONES, URINE: 15 MG/DL
LACTIC ACID, SEPSIS: 2.5 MMOL/L (ref 0.5–1.9)
LACTIC ACID, SEPSIS: 2.7 MMOL/L (ref 0.5–1.9)
LACTIC ACID: 1.8 MMOL/L (ref 0.5–2.2)
LEUKOCYTE ESTERASE, URINE: ABNORMAL
LYMPHOCYTES ABSOLUTE: 0.46 E9/L (ref 1.5–4)
LYMPHOCYTES RELATIVE PERCENT: 5.1 % (ref 20–42)
MCH RBC QN AUTO: 31.2 PG (ref 26–35)
MCHC RBC AUTO-ENTMCNC: 31.5 % (ref 32–34.5)
MCV RBC AUTO: 98.8 FL (ref 80–99.9)
METER GLUCOSE: 154 MG/DL (ref 74–99)
METER GLUCOSE: 204 MG/DL (ref 74–99)
METER GLUCOSE: 232 MG/DL (ref 74–99)
METER GLUCOSE: 281 MG/DL (ref 74–99)
MONOCYTES ABSOLUTE: 0.46 E9/L (ref 0.1–0.95)
MONOCYTES RELATIVE PERCENT: 5.1 % (ref 2–12)
NEUTROPHILS ABSOLUTE: 7.91 E9/L (ref 1.8–7.3)
NEUTROPHILS RELATIVE PERCENT: 88.5 % (ref 43–80)
NITRITE, URINE: POSITIVE
OVALOCYTES: ABNORMAL
PDW BLD-RTO: 14.4 FL (ref 11.5–15)
PH UA: 6 (ref 5–9)
PLATELET # BLD: 167 E9/L (ref 130–450)
PMV BLD AUTO: 10.5 FL (ref 7–12)
POIKILOCYTES: ABNORMAL
POTASSIUM SERPL-SCNC: 4.6 MMOL/L (ref 3.5–5)
PRO-BNP: 8522 PG/ML (ref 0–450)
PROTEIN UA: 100 MG/DL
RBC # BLD: 4.14 E12/L (ref 3.5–5.5)
RBC UA: ABNORMAL /HPF (ref 0–2)
SALICYLATE, SERUM: <0.3 MG/DL (ref 0–30)
SARS-COV-2, NAAT: NOT DETECTED
SODIUM BLD-SCNC: 135 MMOL/L (ref 132–146)
SPECIFIC GRAVITY UA: 1.02 (ref 1–1.03)
TOTAL CK: 88 U/L (ref 20–180)
TOTAL PROTEIN: 6.3 G/DL (ref 6.4–8.3)
TRICYCLIC ANTIDEPRESSANTS SCREEN SERUM: NEGATIVE NG/ML
TROPONIN, HIGH SENSITIVITY: 27 NG/L (ref 0–9)
TROPONIN, HIGH SENSITIVITY: 32 NG/L (ref 0–9)
UROBILINOGEN, URINE: 0.2 E.U./DL
WBC # BLD: 8.9 E9/L (ref 4.5–11.5)
WBC UA: >20 /HPF (ref 0–5)

## 2023-03-08 PROCEDURE — 99285 EMERGENCY DEPT VISIT HI MDM: CPT

## 2023-03-08 PROCEDURE — 96365 THER/PROPH/DIAG IV INF INIT: CPT

## 2023-03-08 PROCEDURE — 83880 ASSAY OF NATRIURETIC PEPTIDE: CPT

## 2023-03-08 PROCEDURE — 97161 PT EVAL LOW COMPLEX 20 MIN: CPT

## 2023-03-08 PROCEDURE — 80076 HEPATIC FUNCTION PANEL: CPT

## 2023-03-08 PROCEDURE — 96361 HYDRATE IV INFUSION ADD-ON: CPT

## 2023-03-08 PROCEDURE — 6360000002 HC RX W HCPCS: Performed by: STUDENT IN AN ORGANIZED HEALTH CARE EDUCATION/TRAINING PROGRAM

## 2023-03-08 PROCEDURE — 6360000004 HC RX CONTRAST MEDICATION: Performed by: RADIOLOGY

## 2023-03-08 PROCEDURE — 97530 THERAPEUTIC ACTIVITIES: CPT

## 2023-03-08 PROCEDURE — 2500000003 HC RX 250 WO HCPCS: Performed by: STUDENT IN AN ORGANIZED HEALTH CARE EDUCATION/TRAINING PROGRAM

## 2023-03-08 PROCEDURE — 87150 DNA/RNA AMPLIFIED PROBE: CPT

## 2023-03-08 PROCEDURE — 87186 SC STD MICRODIL/AGAR DIL: CPT

## 2023-03-08 PROCEDURE — 2580000003 HC RX 258: Performed by: INTERNAL MEDICINE

## 2023-03-08 PROCEDURE — 80143 DRUG ASSAY ACETAMINOPHEN: CPT

## 2023-03-08 PROCEDURE — 2580000003 HC RX 258: Performed by: STUDENT IN AN ORGANIZED HEALTH CARE EDUCATION/TRAINING PROGRAM

## 2023-03-08 PROCEDURE — 73521 X-RAY EXAM HIPS BI 2 VIEWS: CPT

## 2023-03-08 PROCEDURE — 93005 ELECTROCARDIOGRAM TRACING: CPT | Performed by: STUDENT IN AN ORGANIZED HEALTH CARE EDUCATION/TRAINING PROGRAM

## 2023-03-08 PROCEDURE — 84484 ASSAY OF TROPONIN QUANT: CPT

## 2023-03-08 PROCEDURE — 87502 INFLUENZA DNA AMP PROBE: CPT

## 2023-03-08 PROCEDURE — 80307 DRUG TEST PRSMV CHEM ANLYZR: CPT

## 2023-03-08 PROCEDURE — 2700000000 HC OXYGEN THERAPY PER DAY

## 2023-03-08 PROCEDURE — 80179 DRUG ASSAY SALICYLATE: CPT

## 2023-03-08 PROCEDURE — 83605 ASSAY OF LACTIC ACID: CPT

## 2023-03-08 PROCEDURE — 81001 URINALYSIS AUTO W/SCOPE: CPT

## 2023-03-08 PROCEDURE — 85025 COMPLETE CBC W/AUTO DIFF WBC: CPT

## 2023-03-08 PROCEDURE — 87088 URINE BACTERIA CULTURE: CPT

## 2023-03-08 PROCEDURE — 6370000000 HC RX 637 (ALT 250 FOR IP): Performed by: INTERNAL MEDICINE

## 2023-03-08 PROCEDURE — 36415 COLL VENOUS BLD VENIPUNCTURE: CPT

## 2023-03-08 PROCEDURE — 82962 GLUCOSE BLOOD TEST: CPT

## 2023-03-08 PROCEDURE — 87040 BLOOD CULTURE FOR BACTERIA: CPT

## 2023-03-08 PROCEDURE — 6370000000 HC RX 637 (ALT 250 FOR IP): Performed by: STUDENT IN AN ORGANIZED HEALTH CARE EDUCATION/TRAINING PROGRAM

## 2023-03-08 PROCEDURE — 80048 BASIC METABOLIC PNL TOTAL CA: CPT

## 2023-03-08 PROCEDURE — 96375 TX/PRO/DX INJ NEW DRUG ADDON: CPT

## 2023-03-08 PROCEDURE — 97535 SELF CARE MNGMENT TRAINING: CPT

## 2023-03-08 PROCEDURE — 82077 ASSAY SPEC XCP UR&BREATH IA: CPT

## 2023-03-08 PROCEDURE — 2060000000 HC ICU INTERMEDIATE R&B

## 2023-03-08 PROCEDURE — 71045 X-RAY EXAM CHEST 1 VIEW: CPT

## 2023-03-08 PROCEDURE — 70450 CT HEAD/BRAIN W/O DYE: CPT

## 2023-03-08 PROCEDURE — 74177 CT ABD & PELVIS W/CONTRAST: CPT

## 2023-03-08 PROCEDURE — 72125 CT NECK SPINE W/O DYE: CPT

## 2023-03-08 PROCEDURE — 97165 OT EVAL LOW COMPLEX 30 MIN: CPT

## 2023-03-08 PROCEDURE — 82550 ASSAY OF CK (CPK): CPT

## 2023-03-08 PROCEDURE — 93010 ELECTROCARDIOGRAM REPORT: CPT | Performed by: INTERNAL MEDICINE

## 2023-03-08 PROCEDURE — 87635 SARS-COV-2 COVID-19 AMP PRB: CPT

## 2023-03-08 RX ORDER — ASPIRIN 325 MG
325 TABLET ORAL DAILY
Status: DISCONTINUED | OUTPATIENT
Start: 2023-03-08 | End: 2023-03-08 | Stop reason: DRUGHIGH

## 2023-03-08 RX ORDER — SODIUM CHLORIDE 9 MG/ML
INJECTION, SOLUTION INTRAVENOUS CONTINUOUS
Status: DISCONTINUED | OUTPATIENT
Start: 2023-03-08 | End: 2023-03-11

## 2023-03-08 RX ORDER — METRONIDAZOLE 500 MG/100ML
500 INJECTION, SOLUTION INTRAVENOUS ONCE
Status: COMPLETED | OUTPATIENT
Start: 2023-03-08 | End: 2023-03-08

## 2023-03-08 RX ORDER — SODIUM CHLORIDE 9 MG/ML
INJECTION, SOLUTION INTRAVENOUS PRN
Status: DISCONTINUED | OUTPATIENT
Start: 2023-03-08 | End: 2023-03-14 | Stop reason: HOSPADM

## 2023-03-08 RX ORDER — ENOXAPARIN SODIUM 100 MG/ML
40 INJECTION SUBCUTANEOUS DAILY
Status: DISCONTINUED | OUTPATIENT
Start: 2023-03-08 | End: 2023-03-09

## 2023-03-08 RX ORDER — SODIUM CHLORIDE 0.9 % (FLUSH) 0.9 %
10 SYRINGE (ML) INJECTION PRN
Status: DISCONTINUED | OUTPATIENT
Start: 2023-03-08 | End: 2023-03-14 | Stop reason: HOSPADM

## 2023-03-08 RX ORDER — ACETAMINOPHEN 325 MG/1
650 TABLET ORAL EVERY 6 HOURS PRN
Status: DISCONTINUED | OUTPATIENT
Start: 2023-03-08 | End: 2023-03-14 | Stop reason: HOSPADM

## 2023-03-08 RX ORDER — POTASSIUM CHLORIDE 7.45 MG/ML
10 INJECTION INTRAVENOUS PRN
Status: DISCONTINUED | OUTPATIENT
Start: 2023-03-08 | End: 2023-03-14 | Stop reason: HOSPADM

## 2023-03-08 RX ORDER — POTASSIUM CHLORIDE 20 MEQ/1
40 TABLET, EXTENDED RELEASE ORAL PRN
Status: DISCONTINUED | OUTPATIENT
Start: 2023-03-08 | End: 2023-03-14 | Stop reason: HOSPADM

## 2023-03-08 RX ORDER — ONDANSETRON 2 MG/ML
4 INJECTION INTRAMUSCULAR; INTRAVENOUS ONCE
Status: COMPLETED | OUTPATIENT
Start: 2023-03-08 | End: 2023-03-08

## 2023-03-08 RX ORDER — ONDANSETRON 2 MG/ML
4 INJECTION INTRAMUSCULAR; INTRAVENOUS EVERY 6 HOURS PRN
Status: DISCONTINUED | OUTPATIENT
Start: 2023-03-08 | End: 2023-03-14 | Stop reason: HOSPADM

## 2023-03-08 RX ORDER — CHOLESTYRAMINE LIGHT 4 G/5.7G
4 POWDER, FOR SUSPENSION ORAL DAILY
Status: DISCONTINUED | OUTPATIENT
Start: 2023-03-08 | End: 2023-03-14 | Stop reason: HOSPADM

## 2023-03-08 RX ORDER — 0.9 % SODIUM CHLORIDE 0.9 %
1000 INTRAVENOUS SOLUTION INTRAVENOUS ONCE
Status: COMPLETED | OUTPATIENT
Start: 2023-03-08 | End: 2023-03-08

## 2023-03-08 RX ORDER — ACETAMINOPHEN 325 MG/1
650 TABLET ORAL ONCE
Status: COMPLETED | OUTPATIENT
Start: 2023-03-08 | End: 2023-03-08

## 2023-03-08 RX ORDER — INSULIN LISPRO 100 [IU]/ML
0-4 INJECTION, SOLUTION INTRAVENOUS; SUBCUTANEOUS
Status: DISCONTINUED | OUTPATIENT
Start: 2023-03-08 | End: 2023-03-14 | Stop reason: HOSPADM

## 2023-03-08 RX ORDER — PANTOPRAZOLE SODIUM 40 MG/1
40 TABLET, DELAYED RELEASE ORAL
Status: DISCONTINUED | OUTPATIENT
Start: 2023-03-08 | End: 2023-03-14 | Stop reason: HOSPADM

## 2023-03-08 RX ORDER — ACETAMINOPHEN 650 MG/1
650 SUPPOSITORY RECTAL EVERY 6 HOURS PRN
Status: DISCONTINUED | OUTPATIENT
Start: 2023-03-08 | End: 2023-03-14 | Stop reason: HOSPADM

## 2023-03-08 RX ORDER — LEVOTHYROXINE SODIUM 0.12 MG/1
125 TABLET ORAL DAILY
Status: DISCONTINUED | OUTPATIENT
Start: 2023-03-08 | End: 2023-03-14 | Stop reason: HOSPADM

## 2023-03-08 RX ORDER — ONDANSETRON 4 MG/1
4 TABLET, ORALLY DISINTEGRATING ORAL EVERY 8 HOURS PRN
Status: DISCONTINUED | OUTPATIENT
Start: 2023-03-08 | End: 2023-03-14 | Stop reason: HOSPADM

## 2023-03-08 RX ORDER — INSULIN GLARGINE 100 [IU]/ML
5 INJECTION, SOLUTION SUBCUTANEOUS NIGHTLY
Status: DISCONTINUED | OUTPATIENT
Start: 2023-03-08 | End: 2023-03-14 | Stop reason: HOSPADM

## 2023-03-08 RX ORDER — SENNA PLUS 8.6 MG/1
1 TABLET ORAL DAILY PRN
Status: DISCONTINUED | OUTPATIENT
Start: 2023-03-08 | End: 2023-03-14 | Stop reason: HOSPADM

## 2023-03-08 RX ORDER — DEXTROSE MONOHYDRATE 100 MG/ML
INJECTION, SOLUTION INTRAVENOUS CONTINUOUS PRN
Status: DISCONTINUED | OUTPATIENT
Start: 2023-03-08 | End: 2023-03-14 | Stop reason: HOSPADM

## 2023-03-08 RX ORDER — SODIUM CHLORIDE 0.9 % (FLUSH) 0.9 %
10 SYRINGE (ML) INJECTION EVERY 12 HOURS SCHEDULED
Status: DISCONTINUED | OUTPATIENT
Start: 2023-03-08 | End: 2023-03-14 | Stop reason: HOSPADM

## 2023-03-08 RX ORDER — INSULIN LISPRO 100 [IU]/ML
0-4 INJECTION, SOLUTION INTRAVENOUS; SUBCUTANEOUS NIGHTLY
Status: DISCONTINUED | OUTPATIENT
Start: 2023-03-08 | End: 2023-03-14 | Stop reason: HOSPADM

## 2023-03-08 RX ORDER — ISOSORBIDE MONONITRATE 30 MG/1
30 TABLET, EXTENDED RELEASE ORAL DAILY
Status: DISCONTINUED | OUTPATIENT
Start: 2023-03-08 | End: 2023-03-14 | Stop reason: HOSPADM

## 2023-03-08 RX ORDER — ASPIRIN 81 MG/1
81 TABLET, CHEWABLE ORAL DAILY
Status: DISCONTINUED | OUTPATIENT
Start: 2023-03-09 | End: 2023-03-14 | Stop reason: HOSPADM

## 2023-03-08 RX ORDER — BUPROPION HYDROCHLORIDE 150 MG/1
150 TABLET, EXTENDED RELEASE ORAL 2 TIMES DAILY
Status: DISCONTINUED | OUTPATIENT
Start: 2023-03-08 | End: 2023-03-14 | Stop reason: HOSPADM

## 2023-03-08 RX ADMIN — BUPROPION HYDROCHLORIDE 150 MG: 150 TABLET, EXTENDED RELEASE ORAL at 20:04

## 2023-03-08 RX ADMIN — CEFTRIAXONE SODIUM 2000 MG: 2 INJECTION, POWDER, FOR SOLUTION INTRAMUSCULAR; INTRAVENOUS at 04:38

## 2023-03-08 RX ADMIN — INSULIN LISPRO 2 UNITS: 100 INJECTION, SOLUTION INTRAVENOUS; SUBCUTANEOUS at 09:57

## 2023-03-08 RX ADMIN — IOPAMIDOL 75 ML: 755 INJECTION, SOLUTION INTRAVENOUS at 04:54

## 2023-03-08 RX ADMIN — ACETAMINOPHEN 650 MG: 325 TABLET ORAL at 04:04

## 2023-03-08 RX ADMIN — PANTOPRAZOLE SODIUM 40 MG: 40 TABLET, DELAYED RELEASE ORAL at 07:54

## 2023-03-08 RX ADMIN — METRONIDAZOLE 500 MG: 500 INJECTION, SOLUTION INTRAVENOUS at 04:38

## 2023-03-08 RX ADMIN — SODIUM CHLORIDE 1000 ML: 9 INJECTION, SOLUTION INTRAVENOUS at 20:13

## 2023-03-08 RX ADMIN — SODIUM CHLORIDE, PRESERVATIVE FREE 10 ML: 5 INJECTION INTRAVENOUS at 20:06

## 2023-03-08 RX ADMIN — BUPROPION HYDROCHLORIDE 150 MG: 150 TABLET, EXTENDED RELEASE ORAL at 09:55

## 2023-03-08 RX ADMIN — ONDANSETRON 4 MG: 2 INJECTION INTRAMUSCULAR; INTRAVENOUS at 04:17

## 2023-03-08 RX ADMIN — SODIUM CHLORIDE 1000 ML: 9 INJECTION, SOLUTION INTRAVENOUS at 04:04

## 2023-03-08 RX ADMIN — CHOLESTYRAMINE 4 G: 4 POWDER, FOR SUSPENSION ORAL at 09:55

## 2023-03-08 RX ADMIN — SODIUM CHLORIDE: 9 INJECTION, SOLUTION INTRAVENOUS at 07:58

## 2023-03-08 RX ADMIN — INSULIN LISPRO 1 UNITS: 100 INJECTION, SOLUTION INTRAVENOUS; SUBCUTANEOUS at 12:21

## 2023-03-08 RX ADMIN — LEVOTHYROXINE SODIUM 125 MCG: 0.12 TABLET ORAL at 07:54

## 2023-03-08 RX ADMIN — ASPIRIN 325 MG: 325 TABLET ORAL at 09:55

## 2023-03-08 RX ADMIN — INSULIN GLARGINE 5 UNITS: 100 INJECTION, SOLUTION SUBCUTANEOUS at 20:05

## 2023-03-08 RX ADMIN — ISOSORBIDE MONONITRATE 30 MG: 30 TABLET, EXTENDED RELEASE ORAL at 09:56

## 2023-03-08 ASSESSMENT — PAIN - FUNCTIONAL ASSESSMENT: PAIN_FUNCTIONAL_ASSESSMENT: NONE - DENIES PAIN

## 2023-03-08 NOTE — PROGRESS NOTES
Physical Therapy  Facility/Department: [address] INTERNAL MEDICINE 2  Physical Therapy Initial Assessment    Name: Christelle Delgado  : 1935  MRN: [id number]  Date of Service: 3/8/2023      Patient Diagnosis(es): The primary encounter diagnosis was Pyelonephritis. Diagnoses of Nausea and vomiting, unspecified vomiting type, Dehydration, Septicemia ([address] CECY (acute kidney injury) ([address] and Acute hypoxemic respiratory failure ([address] were also pertinent to this visit. Past Medical History:  has a past medical history of Arthritis, CAD (coronary artery disease), Depression, Diabetes mellitus ([address] H/O cardiovascular stress test, Heartburn, History of kidney stones, Hx of blood clots, Hyperlipidemia, Hypertension, Left hip pain, Myocardial infarct [facility]), Thyroid disease, Use of cane as ambulatory aid, and Vitamin D deficiency. Past Surgical History:  has a past surgical history that includes Hysterectomy; Parathyroid gland surgery (9816'I); other surgical history (Right, 2013); Cardiac catheterization ( and ); Total knee arthroplasty (Right, 2/19/15); Knee Arthroplasty (Left, 2016); Cataract removal with implant (Bilateral); arthrography (Left, 2021); joint replacement (Right, ); and Total hip arthroplasty (Left, 2021). Evaluating Therapist: Jenni Corbin PT      Room #:  8507/9501-B  Diagnosis:  Dehydration [E86.0]  Pyelonephritis [N12]  Septicemia ([address] [A41.9]  CECY (acute kidney injury) ([facility] [address] [N17.9]  Acute hypoxemic respiratory failure (HCC) [J96.01]  Nausea and vomiting, unspecified vomiting type [R11.2]  PMHx/PSHx:  CAD, DM  Precautions:  falls, O2    Social:  Pt lives alone in a 1 floor plan with laundry in basement. Independent with ww. States she does everything at home on her own, cooking, laundry, cleaning, driving, groceries. Reports falls at times at home. States since her knee surgery sometimes her legs just give out on her.       Initial Evaluation  Date: 3/8/23 Treatment      Short Term/ Long Term   Goals   Was pt agreeable to Eval/treatment? yes     Does pt have pain? No c/o pain     Bed Mobility  Rolling: SBA  Supine to sit: SBA  Sit to supine: SBA  Scooting: SBA  independent   Transfers Sit to stand: CGA  Stand to sit: CGA  Stand pivot: CGA  independent   Ambulation    25 feet x2 with ww with CGA  100 feet with ww independent   Stair Negotiation  Ascended and descended  NT   4-12 steps with 1 rail independent   LE strength     3+/5    4/5   balance      Fair with ww     AM-PAC Raw score               16/24         Pt is alert and Oriented   LE ROM: WFL  Sensation: intact  Edema: none  Endurance: fair     ASSESSMENT:    Pt displays functional ability as noted in the objective portion of this evaluation. Patient education  Pt educated on importance of mobility    Patient response to education:   Pt verbalized understanding Pt demonstrated skill Pt requires further education in this area   yes    yes       Comments:  Pt CGA for balance with ambulation. Standing balance at sink SBA. Pt stood at sink x10 minutes to brush teeth and comb hair. Educated pt on importance of mobility and calling for assistance to walk to bathroom instead of using pure wick as she lives alone and is independent at baseline. Pt's/ family goals   1. To return home    Conditions Requiring Skilled Therapeutic Intervention:    [x]Decreased strength     []Decreased ROM  [x]Decreased functional mobility  [x]Decreased balance   [x]Decreased endurance   []Decreased posture  []Decreased sensation  []Decreased coordination   []Decreased vision  []Decreased safety awareness   []Increased pain       Patient and or family understand(s) diagnosis, prognosis, and plan of care.     Prognosis is good for reaching above PT goals    PHYSICAL THERAPY PLAN OF CARE:    PT POC is established based on physician order and patient diagnosis     Referring provider/PT Order: Carlota Singh, DO/ PT eval and treat      Current Treatment Recommendations:     [x] Strengthening to improve independence with functional mobility   [] ROM to improve independence with functional mobility   [x] Balance Training to improve static/dynamic balance and to reduce fall risk  [x] Endurance Training to improve activity tolerance during functional mobility   [x] Transfer Training to improve safety and independence with all functional transfers   [x] Gait Training to improve gait mechanics, endurance and assess need for appropriate assistive device  [x] Stair Training in preparation for safe discharge home and/or into the community   [] Positioning to prevent skin breakdown and contractures  [x] Safety and Education Training   [x] Patient/Caregiver Education   [] HEP  [] Other     PT long term treatment goals are located in above grid    Frequency of treatments: 2-5x/week x 5 days. Time in  0838  Time out  0902    Total Treatment Time  10 minutes     Evaluation Time includes thorough review of current medical information, gathering information on past medical history/social history and prior level of function, completion of standardized testing/informal observation of tasks, assessment of data and education on plan of care and goals.       CPT codes:  [x] Low Complexity PT evaluation 08934  [] Moderate Complexity PT evaluation 80193  [] High Complexity PT evaluation 49978  [] PT Re-evaluation 82057  [] Gait training 52249 minutes  [] Manual therapy 32137 minutes  [x] Therapeutic activities 62701 10  minutes  [] Therapeutic exercises 61246 minutes  [] Neuromuscular reeducation 30849 minutes     [facility] PSY[facility]ATRY PT [id number]

## 2023-03-08 NOTE — CARE COORDINATION
3/8/2023  Social Work Discharge Planning: This worker met with Pt and her son to discuss  role and transition of care/discharge planning. Miriam Manifold is 16/24. Pt is agreeable to [facility] and [facility]. Referral was made. Waiting reply. Electronically signed by JOSH He on 3/8/2023 at 1:15 PM    'Case Management Assessment  Initial Evaluation    Date/Time of Evaluation: 3/8/2023 1:23 PM  Assessment Completed by: JOSH He    If patient is discharged prior to next notation, then this note serves as note for discharge by case management. Patient Name: Bisi Gill                   YOB: 1935  Diagnosis: Dehydration [E86.0]  Pyelonephritis [N12]  Septicemia ([address] [A41.9]  CECY (acute kidney injury) ([address] [N17.9]  Acute hypoxemic respiratory failure ([address] [J96.01]  Nausea and vomiting, unspecified vomiting type [R11.2]                   Date / Time: 3/8/2023  3:30 AM    Patient Admission Status: Inpatient   Readmission Risk (Low < 19, Mod (19-27), High > 27): Readmission Risk Score: 11.9    Current PCP: Kwesi Szymanski, DO  PCP verified by CM? Yes    Chart Reviewed: Yes      History Provided by: Patient  Patient Orientation: Alert and Oriented    Patient Cognition: Alert    Hospitalization in the last 30 days (Readmission):  No    If yes, Readmission Assessment in CM Navigator will be completed. Advance Directives:      Code Status: Full Code   Patient's Primary Decision Maker is:        Discharge Planning:    Patient lives with: Children Type of Home: House  Primary Care Giver: Self  Patient Support Systems include: Children, Family Members   Current Financial resources:    Current community resources:    Current services prior to admission: None            Current DME:              Type of Home Care services:  OT, PT    ADLS  Prior functional level:  Independent in ADLs/IADLs  Current functional level: Assistance with the following:, Mobility    PT AM-PAC: /24  OT AM-PAC:   /24    Family can provide assistance at DC: No  Would you like Case Management to discuss the discharge plan with any other family members/significant others, and if so, who? Yes  Plans to Return to Present Housing: No  Other Identified Issues/Barriers to RETURNING to current housing: martina  Potential Assistance needed at discharge: Outpatient PT/OT            Potential DME:    Patient expects to discharge to: House  Plan for transportation at discharge:      Financial    Payor: Carlos Alcalde Deyanira / Plan: [place] / Product Type: *No Product type* /     Does insurance require precert for SNF: Yes    Potential assistance Purchasing Medications:    Meds-to-Beds request: Yes      Javier Valerio [address] [phone] Mickey Germán [address] [place] [address]  Phone: [phone] Fax: [phone]      Notes:    Factors facilitating achievement of predicted outcomes: Family support, Cooperative, and Pleasant    Barriers to discharge: Decreased endurance    Additional Case Management Notes: This worker met with Pt and her son to discuss  role and transition of care/discharge planning. Ronald Mcfarlandt is 16/24. Pt is agreeable to [facility] and [facility]. Referral was made. Waiting reply. The Plan for Transition of Care is related to the following treatment goals of Dehydration [E86.0]  Pyelonephritis [N12]  Septicemia ([facility] [address] [A41.9]  CECY (acute kidney injury) ([facility] [address] [N17.9]  Acute hypoxemic respiratory failure (HCC) [J96.01]  Nausea and vomiting, unspecified vomiting type [V29.6]    IF APPLICABLE: The Patient and/or patient representative Angelina Devlin and her family were provided with a choice of provider and agrees with the discharge plan.  Freedom of choice list with basic dialogue that supports the patient's individualized plan of care/goals and shares the quality data associated with the providers was provided to:     Patient Representative Name:       The Patient and/or Patient Representative Agree with the Discharge Plan?       Joseph Ludwig, [place]  Case Management Department  Ph: [phone] Fax: [phone]

## 2023-03-08 NOTE — Clinical Note
Discharge Plan[de-identified] Other/[facility])   Telemetry/Cardiac Monitoring Required?: Yes

## 2023-03-08 NOTE — PROGRESS NOTES
Occupational Therapy  OCCUPATIONAL THERAPY INITIAL EVALUATION  [facility] [address]    Date: 3/8/2023     Patient Name: Rahel Muniz  MRN: [id number]  : 1935  Room: [address] [place] [address]    Evaluating OT: Rom Tucker, OTR/TIMA - [id number]    Referring Provider: Janae Laguna DO  Specific Provider Orders/Date: \"OT eval and treat\" - 3/8/2023    Diagnosis: Dehydration [E86.0]  Pyelonephritis [N12]  Septicemia ([facility] [A41.9]  CECY (acute kidney injury) ([facility] [N17.9]  Acute hypoxemic respiratory failure (HCC) [J96.01]  Nausea and vomiting, unspecified vomiting type [R11.2]     Patient reported that she has fallen twice recently at home. Pertinent Medical History: CAD, OA, DM, depression, HTN, MI     Precautions: fall risk, O2 via nasal cannula, skin integrity    Assessment of Current Deficits:    [x] Functional mobility   [x]ADLs  [x] Strength               [x]Cognition   [x] Functional transfers   [x] IADLs         [x] Safety Awareness   [x]Endurance   [] Fine Coordination              [x] Balance      [] Vision/perception   [x]Sensation    []Gross Motor Coordination  [] ROM  [] Delirium                   [] Motor Control     OT PLAN OF CARE   OT POC is based on physician orders, patient diagnosis, and results of clinical assessment.   Frequency/Duration 2-5 days/week for 2 weeks PRN   Specific OT Treatment Interventions to Include:   * Instruction/training on adapted ADL techniques and AE recommendations to increase functional independence within precautions       * Training on energy conservation strategies, correct breathing pattern and techniques to improve independence/tolerance for self-care routine  * Functional transfer/mobility training/DME recommendations for increased independence, safety, and fall prevention  * Patient/Family education to increase follow through with safety techniques and functional independence  * Recommendation of environmental modifications for increased safety with functional transfers/mobility and ADLs  * Cognitive retraining/development of therapeutic activities to improve problem solving, judgement, memory, and attention for increased safety/participation in ADL/IADL tasks  * Therapeutic exercise to improve motor endurance, ROM, and functional strength for ADLs/functional transfers  * Therapeutic activities to facilitate/challenge dynamic balance, stand tolerance for increased safety and independence with ADLs  * Neuro-muscular re-education: facilitation of righting/equilibrium reactions, midline orientation, scapular stability/mobility, normalization of muscle tone, and facilitation of volitional active controled movement  * Positioning to improve skin integrity, interaction with environment and functional independence    Recommended Adaptive Equipment: TBD     Home Living: Patient lives alone in a one-floor home; laundry is in the basement. Bathroom Setup: walk-in shower in basement; patient noted that she is unable to get in/out of the tub shower on the main living level  Equipment Owned: cane    Prior Level of Function (PLOF): Patient reported that she was independent with ADLs, IADLs, and functional mobility (with use of cane when out of her home) prior to this hospitalization. Driving: Yes    Pain Level: Patient denied experiencing pain. Cognition: Patient alert and oriented grossly; confusion demonstrated occasionally. Fair+ command follow demonstrated. Patient pleasant and cooperative; decreased insight to current abilities/limitations demonstrated. AM-PAC Applied Cognitive completed this date; see below for details.   Memory: Impaired   Sequencing: Fair  Problem Solving: Fair  Judgement/Safety: Fair    Functional Assessment:  -PAC Daily Activity Raw Score: 16/24   Initial Eval Status  Date: 3/8/2023 Treatment Status  Date:  Short Term Goals = Long Term Goals   Feeding Setup  Independent Grooming Min A for hand hygiene and oral care tasks while standing at sink. Mod I (seated/standing at sink)   UB Dressing Min A  Mod I (including item retrieval)   LB Dressing Max A needed to adjust socks. Min A - with use of AE, as needed/appropriate   Bathing Max A  Min A - with use of AE/DME, as needed/appropriate   Toileting Min A with toilet transfer; SBA for hygiene while seated. Mod I / Independent   Bed Mobility  Supine-to-Sit: SBA  Sit-to-Supine: SBA   Independent in order to maximize patient's independence with ADLs, re-positioning, and other functional tasks. Functional Transfers Sit-to-Stand: Min A   from EOB and toilet  Independent   Functional Mobility Min A (with walker) within patient's room and bathroom. Cues needed to maximize safety with management of walker, particularly during ADLs. Assistance needed with management of O2 line. Mod I with functional mobility (with device, as needed/appropriate) in order to maximize independence with ADLs/IADLs and other functional tasks. Balance Sitting: Good (at EOB)  Standing: Fair- (with walker)  Fair+ dynamic standing balance during completion of ADLs/IADLs and other functional tasks. Activity Tolerance Fair  Patient demonstrated fatigue with ~10 minutes of static/dynamic standing activities during completion of ADLs. Patient will demonstrate Good understanding and consistent implementation of energy conservation techniques and work simplification techniques into ADL/IADL routines. Visual/  Perceptual WFL     N/A   B UE Strength 3+/5  Patient will demonstrate 4/5 B UE strength in order to maximize independence with ADLs/IADLs and functional transfers. Additional Long-Term Goal: Patient will increase functional independence to PLOF in order to allow patient to live in least restrictive environment.       ROM: Additional Information:    R UE  WFL    L UE WFL      Hearing: WFL grossly  Sensation: No complaints of numbness/tingling in B UEs.  Tone: WFL  Edema: No    Comments: RN approved patient's participation in [address] [place] [address] activities. Upon arrival, patient supine in bed asleep; encouragement needed to maximize patient's willingness to participate in OOB activities and ADLs. At end of session, patient supine in bed (per patient preference) with call light and phone within reach and all lines and tubes intact; nursing staff notified that bed alarm was not working prior to conclusion of this session. Patient would benefit from continued skilled OT to increase safety and independence with completion of ADL/IADL tasks for functional independence and quality of life. AM-PAC - Applied Cognitive: How much difficulty does the patient currently have. ..    (If the patient hasn't done an activity recently, how much difficulty do you think he/she would have if he/she tried?) Unable    1 A Lot    2 A Little    3 None    4   Following/understanding a 10 to 15 minute speech or presentation (e.g. lesson at a place of Islam, guest lecture at a senior center)? X     Understanding familiar people during ordinary conversations? X    Remembering to take medications at the appropriate time? X     Remembering where things were placed or put away (e.g. keys)? X     Remembering a list of 4 or 5 errands without writing it down? X     Taking care of complicated tasks like managing a checking account or getting appliances fixed? X       AM-PAC Applied Cognitive Raw Score:        Treatment: OT treatment provided this date included:   Instruction/training on safety and adapted techniques for completion of ADLs. Instruction/training on safe functional mobility/transfer techniques. Instruction/training on energy conservation/work simplification techniques for implementation into ADL/IADL routines.     Patient education provided regardin) importance of having staff assistance with ADLs and other OOB activities to prevent falls/injury during hospitalization, 2) importance of OOB activities during hospitalization (including functional mobility to/from the bathroom throughout the day instead of relying upon PureWick catheter), 3) potential benefits of continued therapy services upon discharge. Patient indicated [address] understanding. Further skilled OT treatment indicated to increase patient's safety and independence with completion of ADL/IADL tasks in order to maximize patient's functional independence and quality of life. Rehab Potential: Good for established goals. Patient / Family Goal: Patient wants to be able to return to her PLOF and home environment. Patient and/or family were instructed on functional diagnosis, prognosis/goals, and OT plan of care. Demonstrated Fair+ understanding. Eval Complexity: Low    Time In: 1410  Time Out: 1450  Total Treatment Time: 25 minutes      Minutes Units   OT Eval Low 50207 15 1   OT Eval Medium 79235     OT Eval High 03500     OT Re-Eval [id number]     Therapeutic Ex 29896     Therapeutic Activities 78352 10 1   ADL/Self Care 65474 15 1   Orthotic Management 86111     Neuro Re-Ed 52953     Non-Billable Time N/A ---     Evaluation time includes thorough review of current medical information, gathering information on past medical history/social history and prior level of function, completion of standardized testing/informal observation of tasks, assessment of data, and education on plan of care and goals. HANNAH Gaines/L  License Number: [id number]
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
## 2023-03-08 NOTE — ED PROVIDER NOTES
[address]        Pt Name: Luis Fontaine  MRN: [id number]  [place] 1935  Date of evaluation: 3/8/2023  Provider: Chriss Phipps MD  PCP: Erica Henning DO  Note Started: 3:39 AM EST 3/8/23    CHIEF COMPLAINT       Chief Complaint   Patient presents with    Abdominal Pain    Nausea     N/v starting tonight, daughter in law reports she was fine going to bed       HISTORY OF PRESENT ILLNESS: 1 or more Elements        Limitations to history : None    Luis Fontaine is a 82 y.o. female who presents to the [facility] for abdominal pain, nausea, vomiting. Daughter-in-law reports that the patient was not feeling well today, went to bed, and woke up around 230 with vomiting, was unable to ambulate, was able to do that earlier in the day. Patient denies any fevers or chills or chest pain states she does feel nauseated. Per the daughter-in-law she is mildly confused, and apparently fell around 5 days ago. Nursing Notes were all reviewed and agreed with or any disagreements were addressed in the HPI. REVIEW OF EXTERNAL NOTE :       As below    REVIEW OF SYSTEMS :      Positives and Pertinent negatives as per HPI.      SURGICAL HISTORY     Past Surgical History:   Procedure Laterality Date    ARTHROGRAPHY Left 5/20/2021    LEFT HIP INJECTION ARTHROGRAM performed by Chelsey Forbes MD at [place] [address] Bilateral     HYSTERECTOMY      JOINT REPLACEMENT Right 2013    hip    KNEE ARTHROPLASTY Left 07/19/2016    OTHER SURGICAL HISTORY Right 5/23/2013    hip arthrogram    PARATHYROID GLAND SURGERY  1990's    TOTAL HIP ARTHROPLASTY Left 8/19/2021    LEFT HIP TOTAL ARTHROPLASTY performed by Chelsey Forbes MD at [address] [place] [address] Right 2/19/15    RIGHT TOTAL KNEE ARTHROPLASTY        Brii Moses Previous Medications    ACCU-CHEK JOELLE PLUS STRIP        ALLOPURINOL (ZYLOPRIM) 300 MG TABLET    Take 300 mg by mouth daily. Taking for kidney stones    ASPIRIN 325 MG TABLET    Take 325 mg by mouth daily Last dose 21    BUPROPION SR (WELLBUTRIN SR) 150 MG SR TABLET    Take 150 mg by mouth 2 times daily Instructed to take morning of surgery with a sip of water    CHOLECALCIFEROL (VITAMIN D3) 2000 UNITS CAPS    Take 2,000 Units by mouth daily Last dose 21    CHOLESTYRAMINE LIGHT 4 G PACKET    Take 4 g by mouth daily     ISOSORBIDE MONONITRATE (IMDUR) 30 MG CR TABLET    Take 30 mg by mouth daily Instructed to take morning of surgery with a sip of water    LEVOTHYROXINE (SYNTHROID) 125 MCG TABLET    Take 125 mcg by mouth Daily    LIDOCAINE (LIDODERM) 5 %    Place 1 patch onto the skin daily 12 hours on, 12 hours off. LISINOPRIL (PRINIVIL;ZESTRIL) 10 MG TABLET    Take 10 mg by mouth daily. METOPROLOL TARTRATE (LOPRESSOR PO)    Take 50 mg by mouth 2 times daily Instructed to take morning of surgery with a sip of water    MULTIVITAMIN-IRON-MINERALS-FOLIC ACID (CENTRUM) CHEWABLE TABLET    Take 1 tablet by mouth daily Last dose 21    OMEPRAZOLE (PRILOSEC) 20 MG DELAYED RELEASE CAPSULE    Take 20 mg by mouth daily Instructed to take morning of surgery with a sip of water    POTASSIUM CITRATE (UROCIT-K 10 PO)    Take 2 tablets by mouth 2 times daily        ALLERGIES     Patient has no known allergies. FAMILYHISTORY     No family history on file. SOCIAL HISTORY       Social History     Tobacco Use    Smoking status: Former     Types: Cigarettes     Quit date: 1974     Years since quittin.8    Smokeless tobacco: Never   Vaping Use    Vaping Use: Never used   Substance Use Topics    Alcohol use: Not Currently     Comment: very rare    Drug use: No       SCREENINGS        [place] Coma Scale  Eye Opening:  To speech  Best Verbal Response: Confused  Best Motor Response: Obeys commands  Ping Coma Scale Score: 13                [facility] Assessment  BP: (!) 116/56  Heart Rate: 93           PHYSICAL EXAM  1 or more Elements     ED Triage Vitals   BP Temp Temp src Pulse Resp SpO2 Height Weight   -- -- -- -- -- -- -- --         Physical Exam  Vitals and nursing note reviewed. Constitutional:       Appearance: Normal appearance. She is ill-appearing. HENT:      Head: Normocephalic and atraumatic. Right Ear: External ear normal.      Left Ear: External ear normal.      Nose: Nose normal.      Mouth/Throat:      Mouth: Mucous membranes are dry. Eyes:      Extraocular Movements: Extraocular movements intact. Pupils: Pupils are equal, round, and reactive to light. Cardiovascular:      Rate and Rhythm: Normal rate and regular rhythm. Pulses: Normal pulses. Heart sounds: Normal heart sounds. Pulmonary:      Effort: Pulmonary effort is normal.      Breath sounds: Normal breath sounds. Abdominal:      General: Abdomen is flat. Bowel sounds are normal.      Palpations: Abdomen is soft. Musculoskeletal:         General: Normal range of motion. Cervical back: Normal range of motion and neck supple. Skin:     General: Skin is warm and dry. Neurological:      Mental Status: She is alert. She is disoriented. Motor: Weakness (Generalized weakness throughout with some tremor) present.       Comments: Did not know the year, guessed 2024                DIAGNOSTIC RESULTS   LABS:    Labs Reviewed   CBC WITH AUTO DIFFERENTIAL - Abnormal; Notable for the following components:       Result Value    MCHC 31.5 (*)     Neutrophils % 88.5 (*)     Lymphocytes % 5.1 (*)     Neutrophils Absolute 7.91 (*)     Lymphocytes Absolute 0.46 (*)     Eosinophils Absolute 0.01 (*)     All other components within normal limits   BASIC METABOLIC PANEL - Abnormal; Notable for the following components:    CO2 20 (*)     Glucose 252 (*)     Creatinine 1.2 (*)     All other components within normal limits   HEPATIC FUNCTION PANEL - Abnormal; Notable for the following components: Total Protein 6.3 (*)     Total Bilirubin 1.9 (*)     Bilirubin, Direct 0.9 (*)     All other components within normal limits   TROPONIN - Abnormal; Notable for the following components:    Troponin, High Sensitivity 27 (*)     All other components within normal limits   BRAIN NATRIURETIC PEPTIDE - Abnormal; Notable for the following components:    Pro-BNP 8,522 (*)     All other components within normal limits   URINALYSIS WITH MICROSCOPIC - Abnormal; Notable for the following components:    Clarity, UA CLOUDY (*)     Ketones, Urine 15 (*)     Blood, Urine LARGE (*)     Protein,  (*)     Nitrite, Urine POSITIVE (*)     Leukocyte Esterase, Urine MODERATE (*)     WBC, UA >20 (*)     RBC, UA 10-20 (*)     Bacteria, UA MODERATE (*)     All other components within normal limits   SERUM DRUG SCREEN - Abnormal; Notable for the following components:    Acetaminophen Level <5.0 (*)     All other components within normal limits   LACTATE, SEPSIS - Abnormal; Notable for the following components:    Lactic Acid, Sepsis 2.5 (*)     All other components within normal limits   COVID-19, RAPID   RAPID INFLUENZA A/B ANTIGENS   CULTURE, BLOOD 1   CULTURE, BLOOD 2   CULTURE, URINE   CK   LACTATE, SEPSIS    Narrative:     Draw gray top tube-place on ice.~Separate plasma-keep cold. TROPONIN    Narrative:     High Sensitivity Troponin T       As interpreted by me, the above displayed labs are abnormal. All other labs obtained during this visit were within normal range or not returned as of this dictation. RADIOLOGY:   Non-plain film images such as CT, Ultrasound and MRI are read by the radiologist. Plain radiographic images are visualized and preliminarily interpreted by the ED Provider with the findings documented in the ED Course.     Interpretation per the Radiologist below, if available at the time of this note:    XR CHEST PORTABLE Final Result   Aortic atherosclerosis. Central bronchial thickening which may be related to peribronchial edema or   inflammatory change. XR HIP BILATERAL W AP PELVIS (2 VIEWS)   Final Result   1. Intact bilateral total hip arthroplasties, with no periprosthetic fracture   or hardware complication. CT HEAD WO CONTRAST   Final Result   1. No acute intracranial abnormality is identified. 2.  Generalized age-related cortical atrophy, with intracranial   atherosclerosis and CT findings suggestive of chronic microvascular ischemic   change. .   3. Focal left cerebellar hemisphere encephalomalacia related to remote   infarct, also noted on the previous scan though less conspicuous given   calvarial artifact. 4. Degenerative changes in the cervical spine without acute osseous fracture. CT CERVICAL SPINE WO CONTRAST   Final Result   1. No acute intracranial abnormality is identified. 2.  Generalized age-related cortical atrophy, with intracranial   atherosclerosis and CT findings suggestive of chronic microvascular ischemic   change. .   3. Focal left cerebellar hemisphere encephalomalacia related to remote   infarct, also noted on the previous scan though less conspicuous given   calvarial artifact. 4. Degenerative changes in the cervical spine without acute osseous fracture. CT ABDOMEN PELVIS W IV CONTRAST Additional Contrast? None   Final Result   1. Periportal low attenuation is seen within the liver with mild   heterogeneous enhancement. Correlate with liver enzymes as hepatitis could   have this appearance. 2. Nonobstructive right renal calculi. No significant hydroureteronephrosis   is identified. Mild stranding seen adjacent to the ureters. Correlate for   possible ascending urinary tract infection. Bladder not well assessed given   pelvic artifact. 3. Small hiatal hernia. 4. Atherosclerosis including coronary artery involvement.    5. There is a 4 mm pulmonary nodule in the right lower lobe. No follow-up   imaging recommended. Reference below. 6. Other nonacute incidental findings as above. RECOMMENDATIONS:   4 mm right solid pulmonary nodule. No routine follow-up imaging is   recommended per Fleischner Society Guidelines. These guidelines do not apply to immunocompromised patients and patients with   cancer. Follow up in patients with significant comorbidities as clinically   warranted. For lung cancer screening, adhere to Lung-RADS guidelines. Reference: Radiology. 2017; 284(1):228-43. No results found. No results found. PROCEDURES   Unless otherwise noted below, none       CRITICAL CARE TIME (.cct)   None    PAST MEDICAL HISTORY/Chronic Conditions Affecting Care      has a past medical history of Arthritis, CAD (coronary artery disease) (1997), Depression, Diabetes mellitus ([address] H/O cardiovascular stress test (06/29/2016), Heartburn, History of kidney stones, blood clots (1973), Hyperlipidemia, Hypertension, Left hip pain (05/2021), Myocardial infarct ([address] (1997), Thyroid disease, Use of cane as ambulatory aid, and Vitamin D deficiency.      EMERGENCY DEPARTMENT COURSE    Vitals:    Vitals:    03/08/23 0340 03/08/23 0419 03/08/23 0515   BP: (!) 138/105 104/84 (!) 116/56   Pulse: 89 86 93   Resp: 18 16 14   Temp: (!) 103 °F (39.4 °C)  100.4 °F (38 °C)   TempSrc: Axillary  Oral   SpO2: 93% 95% (!) 88%   Height: 5' 1\" (1.549 m)         Patient was given the following medications:  Medications   0.9 % sodium chloride bolus (0 mLs IntraVENous Stopped 3/8/23 0546)   ondansetron (ZOFRAN) injection 4 mg (4 mg IntraVENous Given 3/8/23 0417)   acetaminophen (TYLENOL) tablet 650 mg (650 mg Oral Given 3/8/23 0404)   cefTRIAXone (ROCEPHIN) 2,000 mg in sterile water 20 mL IV syringe (2,000 mg IntraVENous Given 3/8/23 0438)   metronidazole (FLAGYL) 500 mg in 0.9% NaCl 100 mL IVPB premix (0 mg IntraVENous Stopped 3/8/23 0546)   iopamidol (ISOVUE-370) 76 % injection 75 mL (75 mLs IntraVENous Given 3/8/23 0454)         Is this patient to be included in the SEP-1 Core Measure due to severe sepsis or septic shock? No Exclusion criteria - the patient is NOT to be included for SEP-1 Core Measure due to: May have criteria for sepsis, but does not meet criteria for severe sepsis or septic shock          Medical Decision Making/Differential Diagnosis:    CC/HPI Summary, Social Determinants of health, Records Reviewed, DDx, testing done/not done, ED Course, Reassessment, disposition considerations/shared decision making with patient, consults, disposition:        ED Course as of 03/08/23 0549   Wed Mar 08, 2023   0334 Admission 8/19/2021 patient does have a history of hip replacement on the right side. [JG]   0446 Lactic acid is elevated at 2.5, patient was ordered fluids earlier. [JG]   0449 EKG: This EKG is signed by emergency department physician. Rate: 90  Rhythm: Sinus  Interpretation: non-specific EKG  Comparison: stable as compared to patient's most recent EKG from 8/13/2021     [JG]   0459 We will send a urine culture, urinalysis is nitrite positive leukocyte Estrace positive. [JG]   [id number] On my interpretation patient CT abdomen pelvis does not appear to have evidence of free air. [JG]   [id number] Pulmonary nodule seen on CT abdomen pelvis, nonobstructive renal stone seen on the right side, along with stranding along the kidney consistent with pyelonephritis. Will admit patient. [JG]      ED Course User Index  [JG] Prem Reaves MD       Medical Decision Making  79-year-old female presenting emerged from for abdominal pain, nausea, vomiting, dehydration. Also altered mental status. Being cared for by her daughter-in-law who brought her in due to confusion and inability to ambulate at home after having several bouts of emesis.   Upon arrival patient was notably ill-appearing, was febrile with a temp of 103, not tachycardic, saturating well on room air initially, but later did drop to 88% and improved after being placed on 1 L. Given patient's fever, and altered mental status, empirically treated for intra-abdominal sepsis given her nausea and vomiting, was given Rocephin and Flagyl. Found to have evidence of urinary tract infection on UA, consider pneumonia as well, however chest x-ray did not appear to be consistent with this, there was noted pulmonary nodule seen on CT imaging. Ordered CT to evaluate for evidence of obstructing stone given patient's CECY on laboratory work, patient did have a kidney stone, but there was no evidence of hydronephrosis requiring stenting. Patient admitted to hospital for further work-up management of her dehydration, pyelonephritis, acute kidney injury, sepsis, admitted by Dr. Denise Peña:  Acute hypoxemic respiratory failure [facility]): acute illness or injury  CECY (acute kidney injury) [facility]): acute illness or injury  Dehydration: acute illness or injury  Nausea and vomiting, unspecified vomiting type: acute illness or injury  Pyelonephritis: acute illness or injury  Septicemia [facility]): acute illness or injury    Amount and/or Complexity of Data Reviewed  Independent Historian: caregiver  External Data Reviewed: notes. Labs: ordered. Decision-making details documented in ED Course. Radiology: ordered and independent interpretation performed. Decision-making details documented in ED Course. ECG/medicine tests: ordered and independent interpretation performed. Decision-making details documented in ED Course. Risk  OTC drugs. Prescription drug management. Decision regarding hospitalization. CONSULTS: (Who and What was discussed)  IP CONSULT TO INTERNAL MEDICINE        I am the Primary Clinician of Record. FINAL IMPRESSION      1. Pyelonephritis    2. Nausea and vomiting, unspecified vomiting type    3. Dehydration    4. Septicemia ([address]    5. CECY (acute kidney injury) ([address]    6.  Acute hypoxemic respiratory failure ([facility]          DISPOSITION/PLAN     DISPOSITION Admitted 03/08/2023 05:46:43 AM      PATIENT REFERRED TO:  No follow-up provider specified.     DISCHARGE MEDICATIONS:  New Prescriptions    No medications on file       DISCONTINUED MEDICATIONS:  Discontinued Medications    No medications on file              (Please note that portions of this note were completed with a voice recognition program.  Efforts were made to edit the dictations but occasionally words are mis-transcribed.)    Lisa Edwards MD (electronically signed)           Georgette Mejia MD  Resident  03/08/23 9969

## 2023-03-08 NOTE — ED NOTES
Pt transported to [address] [place] [address], [address] [place] [address]  03/08/23 0016

## 2023-03-09 LAB
ACINETOBACTER CALCOAC BAUMANNII COMPLEX BY PCR: NOT DETECTED
ALBUMIN SERPL-MCNC: 2.7 G/DL (ref 3.5–5.2)
ALP BLD-CCNC: 61 U/L (ref 35–104)
ALT SERPL-CCNC: 7 U/L (ref 0–32)
ANION GAP SERPL CALCULATED.3IONS-SCNC: 7 MMOL/L (ref 7–16)
AST SERPL-CCNC: 11 U/L (ref 0–31)
BACTEROIDES FRAGILIS BY PCR: NOT DETECTED
BASOPHILS ABSOLUTE: 0.03 E9/L (ref 0–0.2)
BASOPHILS RELATIVE PERCENT: 0.3 % (ref 0–2)
BILIRUB SERPL-MCNC: 0.6 MG/DL (ref 0–1.2)
BOTTLE TYPE: ABNORMAL
BUN BLDV-MCNC: 23 MG/DL (ref 6–23)
CALCIUM SERPL-MCNC: 8.1 MG/DL (ref 8.6–10.2)
CANDIDA ALBICANS BY PCR: NOT DETECTED
CANDIDA AURIS BY PCR: NOT DETECTED
CANDIDA GLABRATA BY PCR: NOT DETECTED
CANDIDA KRUSEI BY PCR: NOT DETECTED
CANDIDA PARAPSILOSIS BY PCR: NOT DETECTED
CANDIDA TROPICALIS BY PCR: NOT DETECTED
CARBAPENEM RESISTANCE IMP GENE BY PCR: NOT DETECTED
CARBAPENEM RESISTANCE KPC BY PCR: NOT DETECTED
CARBAPENEM RESISTANCE NDM GENE BY PCR: NOT DETECTED
CARBAPENEM RESISTANCE OXA-48 GENE BY PCR: NOT DETECTED
CARBAPENEM RESISTANCE VIM GENE BY PCR: NOT DETECTED
CEPHALOSPORIN RESISTANCE CTX-M GENE BY PCR: NOT DETECTED
CHLORIDE BLD-SCNC: 108 MMOL/L (ref 98–107)
CO2: 21 MMOL/L (ref 22–29)
COLISTIN RESISTANCE MCR-1 GENE BY PCR: NOT DETECTED
CREAT SERPL-MCNC: 1.3 MG/DL (ref 0.5–1)
CRYPTOCOCCUS NEOFORMANS/GATTII BY PCR: NOT DETECTED
EKG ATRIAL RATE: 125 BPM
EKG Q-T INTERVAL: 280 MS
EKG QRS DURATION: 90 MS
EKG QTC CALCULATION (BAZETT): 405 MS
EKG R AXIS: -24 DEGREES
EKG T AXIS: -92 DEGREES
EKG VENTRICULAR RATE: 126 BPM
ENTEROBACTER CLOACAE COMPLEX BY PCR: NOT DETECTED
ENTEROBACTERALES BY PCR: DETECTED
ENTEROCOCCUS FAECALIS BY PCR: NOT DETECTED
ENTEROCOCCUS FAECIUM BY PCR: NOT DETECTED
EOSINOPHILS ABSOLUTE: 0.15 E9/L (ref 0.05–0.5)
EOSINOPHILS RELATIVE PERCENT: 1.6 % (ref 0–6)
ESCHERICHIA COLI BY PCR: DETECTED
GFR SERPL CREATININE-BSD FRML MDRD: 40 ML/MIN/1.73
GLUCOSE BLD-MCNC: 143 MG/DL (ref 74–99)
HAEMOPHILUS INFLUENZAE BY PCR: NOT DETECTED
HCT VFR BLD CALC: 34.4 % (ref 34–48)
HEMOGLOBIN: 10.7 G/DL (ref 11.5–15.5)
IMMATURE GRANULOCYTES #: 0.13 E9/L
IMMATURE GRANULOCYTES %: 1.4 % (ref 0–5)
KLEBSIELLA AEROGENES BY PCR: NOT DETECTED
KLEBSIELLA OXYTOCA BY PCR: NOT DETECTED
KLEBSIELLA PNEUMONIAE GROUP BY PCR: NOT DETECTED
LISTERIA MONOCYTOGENES BY PCR: NOT DETECTED
LYMPHOCYTES ABSOLUTE: 0.94 E9/L (ref 1.5–4)
LYMPHOCYTES RELATIVE PERCENT: 10.1 % (ref 20–42)
MAGNESIUM: 1.7 MG/DL (ref 1.6–2.6)
MCH RBC QN AUTO: 30.7 PG (ref 26–35)
MCHC RBC AUTO-ENTMCNC: 31.1 % (ref 32–34.5)
MCV RBC AUTO: 98.9 FL (ref 80–99.9)
METER GLUCOSE: 131 MG/DL (ref 74–99)
METER GLUCOSE: 144 MG/DL (ref 74–99)
METER GLUCOSE: 148 MG/DL (ref 74–99)
METER GLUCOSE: 197 MG/DL (ref 74–99)
MONOCYTES ABSOLUTE: 1.02 E9/L (ref 0.1–0.95)
MONOCYTES RELATIVE PERCENT: 11 % (ref 2–12)
NEISSERIA MENINGITIDIS BY PCR: NOT DETECTED
NEUTROPHILS ABSOLUTE: 7.04 E9/L (ref 1.8–7.3)
NEUTROPHILS RELATIVE PERCENT: 75.6 % (ref 43–80)
ORDER NUMBER: ABNORMAL
PDW BLD-RTO: 14.6 FL (ref 11.5–15)
PLATELET # BLD: 153 E9/L (ref 130–450)
PMV BLD AUTO: 11 FL (ref 7–12)
POTASSIUM REFLEX MAGNESIUM: 4 MMOL/L (ref 3.5–5)
PROTEUS SPECIES BY PCR: NOT DETECTED
PSEUDOMONAS AERUGINOSA BY PCR: NOT DETECTED
RBC # BLD: 3.48 E12/L (ref 3.5–5.5)
SALMONELLA SPECIES BY PCR: NOT DETECTED
SERRATIA MARCESCENS BY PCR: NOT DETECTED
SODIUM BLD-SCNC: 136 MMOL/L (ref 132–146)
SOURCE OF BLOOD CULTURE: ABNORMAL
STAPHYLOCOCCUS AUREUS BY PCR: NOT DETECTED
STAPHYLOCOCCUS EPIDERMIDIS BY PCR: NOT DETECTED
STAPHYLOCOCCUS LUGDUNENSIS BY PCR: NOT DETECTED
STAPHYLOCOCCUS SPECIES BY PCR: NOT DETECTED
STENOTROPHOMONAS MALTOPHILIA BY PCR: NOT DETECTED
STREPTOCOCCUS AGALACTIAE BY PCR: NOT DETECTED
STREPTOCOCCUS PNEUMONIAE BY PCR: NOT DETECTED
STREPTOCOCCUS PYOGENES  BY PCR: NOT DETECTED
STREPTOCOCCUS SPECIES BY PCR: NOT DETECTED
TOTAL PROTEIN: 5.2 G/DL (ref 6.4–8.3)
TROPONIN, HIGH SENSITIVITY: 34 NG/L (ref 0–9)
TSH SERPL DL<=0.05 MIU/L-ACNC: 0.78 UIU/ML (ref 0.27–4.2)
WBC # BLD: 9.3 E9/L (ref 4.5–11.5)

## 2023-03-09 PROCEDURE — 6360000002 HC RX W HCPCS

## 2023-03-09 PROCEDURE — 6370000000 HC RX 637 (ALT 250 FOR IP): Performed by: INTERNAL MEDICINE

## 2023-03-09 PROCEDURE — 83735 ASSAY OF MAGNESIUM: CPT

## 2023-03-09 PROCEDURE — 36415 COLL VENOUS BLD VENIPUNCTURE: CPT

## 2023-03-09 PROCEDURE — 93005 ELECTROCARDIOGRAM TRACING: CPT

## 2023-03-09 PROCEDURE — 85025 COMPLETE CBC W/AUTO DIFF WBC: CPT

## 2023-03-09 PROCEDURE — 84443 ASSAY THYROID STIM HORMONE: CPT

## 2023-03-09 PROCEDURE — 97530 THERAPEUTIC ACTIVITIES: CPT

## 2023-03-09 PROCEDURE — 97535 SELF CARE MNGMENT TRAINING: CPT

## 2023-03-09 PROCEDURE — 2060000000 HC ICU INTERMEDIATE R&B

## 2023-03-09 PROCEDURE — 82962 GLUCOSE BLOOD TEST: CPT

## 2023-03-09 PROCEDURE — 2500000003 HC RX 250 WO HCPCS: Performed by: INTERNAL MEDICINE

## 2023-03-09 PROCEDURE — APPSS60 APP SPLIT SHARED TIME 46-60 MINUTES

## 2023-03-09 PROCEDURE — 93010 ELECTROCARDIOGRAM REPORT: CPT | Performed by: INTERNAL MEDICINE

## 2023-03-09 PROCEDURE — 2580000003 HC RX 258: Performed by: INTERNAL MEDICINE

## 2023-03-09 PROCEDURE — 84484 ASSAY OF TROPONIN QUANT: CPT

## 2023-03-09 PROCEDURE — 6360000002 HC RX W HCPCS: Performed by: INTERNAL MEDICINE

## 2023-03-09 PROCEDURE — 99222 1ST HOSP IP/OBS MODERATE 55: CPT | Performed by: INTERNAL MEDICINE

## 2023-03-09 PROCEDURE — 2700000000 HC OXYGEN THERAPY PER DAY

## 2023-03-09 PROCEDURE — 80053 COMPREHEN METABOLIC PANEL: CPT

## 2023-03-09 RX ORDER — METOPROLOL TARTRATE 5 MG/5ML
5 INJECTION INTRAVENOUS ONCE
Status: COMPLETED | OUTPATIENT
Start: 2023-03-09 | End: 2023-03-09

## 2023-03-09 RX ORDER — 0.9 % SODIUM CHLORIDE 0.9 %
250 INTRAVENOUS SOLUTION INTRAVENOUS ONCE
Status: COMPLETED | OUTPATIENT
Start: 2023-03-09 | End: 2023-03-09

## 2023-03-09 RX ORDER — DIGOXIN 0.25 MG/ML
250 INJECTION INTRAMUSCULAR; INTRAVENOUS EVERY 6 HOURS SCHEDULED
Status: COMPLETED | OUTPATIENT
Start: 2023-03-09 | End: 2023-03-10

## 2023-03-09 RX ORDER — ENOXAPARIN SODIUM 100 MG/ML
1 INJECTION SUBCUTANEOUS 2 TIMES DAILY
Status: DISCONTINUED | OUTPATIENT
Start: 2023-03-09 | End: 2023-03-11

## 2023-03-09 RX ORDER — DIGOXIN 0.25 MG/ML
500 INJECTION INTRAMUSCULAR; INTRAVENOUS ONCE
Status: COMPLETED | OUTPATIENT
Start: 2023-03-09 | End: 2023-03-09

## 2023-03-09 RX ORDER — MAGNESIUM SULFATE IN WATER 40 MG/ML
2000 INJECTION, SOLUTION INTRAVENOUS ONCE
Status: COMPLETED | OUTPATIENT
Start: 2023-03-09 | End: 2023-03-09

## 2023-03-09 RX ORDER — ATORVASTATIN CALCIUM 20 MG/1
20 TABLET, FILM COATED ORAL DAILY
COMMUNITY

## 2023-03-09 RX ORDER — DILTIAZEM HYDROCHLORIDE 5 MG/ML
10 INJECTION INTRAVENOUS ONCE
Status: COMPLETED | OUTPATIENT
Start: 2023-03-09 | End: 2023-03-09

## 2023-03-09 RX ADMIN — BUPROPION HYDROCHLORIDE 150 MG: 150 TABLET, EXTENDED RELEASE ORAL at 08:05

## 2023-03-09 RX ADMIN — MAGNESIUM SULFATE HEPTAHYDRATE 2000 MG: 40 INJECTION, SOLUTION INTRAVENOUS at 09:49

## 2023-03-09 RX ADMIN — DILTIAZEM HYDROCHLORIDE 10 MG: 5 INJECTION INTRAVENOUS at 08:00

## 2023-03-09 RX ADMIN — DIGOXIN 250 MCG: 0.25 INJECTION INTRAMUSCULAR; INTRAVENOUS at 20:21

## 2023-03-09 RX ADMIN — SODIUM CHLORIDE 250 ML: 9 INJECTION, SOLUTION INTRAVENOUS at 01:51

## 2023-03-09 RX ADMIN — BUPROPION HYDROCHLORIDE 150 MG: 150 TABLET, EXTENDED RELEASE ORAL at 20:23

## 2023-03-09 RX ADMIN — METOPROLOL TARTRATE 5 MG: 5 INJECTION, SOLUTION INTRAVENOUS at 03:59

## 2023-03-09 RX ADMIN — INSULIN GLARGINE 5 UNITS: 100 INJECTION, SOLUTION SUBCUTANEOUS at 20:27

## 2023-03-09 RX ADMIN — LEVOTHYROXINE SODIUM 125 MCG: 0.12 TABLET ORAL at 06:34

## 2023-03-09 RX ADMIN — DIGOXIN 500 MCG: 0.25 INJECTION INTRAMUSCULAR; INTRAVENOUS at 13:52

## 2023-03-09 RX ADMIN — SODIUM CHLORIDE: 9 INJECTION, SOLUTION INTRAVENOUS at 07:56

## 2023-03-09 RX ADMIN — DEXTROSE MONOHYDRATE 5 MG/HR: 50 INJECTION, SOLUTION INTRAVENOUS at 08:04

## 2023-03-09 RX ADMIN — PANTOPRAZOLE SODIUM 40 MG: 40 TABLET, DELAYED RELEASE ORAL at 06:33

## 2023-03-09 RX ADMIN — ASPIRIN 81 MG CHEWABLE TABLET 81 MG: 81 TABLET CHEWABLE at 08:05

## 2023-03-09 RX ADMIN — SENNOSIDES 8.6 MG: 8.6 TABLET, FILM COATED ORAL at 18:40

## 2023-03-09 RX ADMIN — CHOLESTYRAMINE 4 G: 4 POWDER, FOR SUSPENSION ORAL at 08:05

## 2023-03-09 RX ADMIN — ENOXAPARIN SODIUM 90 MG: 100 INJECTION SUBCUTANEOUS at 20:23

## 2023-03-09 RX ADMIN — CEFTRIAXONE 1000 MG: 1 INJECTION, POWDER, FOR SOLUTION INTRAMUSCULAR; INTRAVENOUS at 06:34

## 2023-03-09 RX ADMIN — ENOXAPARIN SODIUM 40 MG: 100 INJECTION SUBCUTANEOUS at 08:07

## 2023-03-09 ASSESSMENT — ENCOUNTER SYMPTOMS
VOMITING: 0
COUGH: 0
NAUSEA: 0
DIARRHEA: 0
SHORTNESS OF BREATH: 1

## 2023-03-09 NOTE — PROGRESS NOTES
Physical Therapy  Facility/Department: [address] INTERNAL MEDICINE 2  Daily Treatment Note  NAME: Alexus Perkins  : 1935  MRN: [id number]    Date of Service: 3/9/2023    Patient Diagnosis(es): The primary encounter diagnosis was Pyelonephritis. Diagnoses of Nausea and vomiting, unspecified vomiting type, Dehydration, Septicemia ([address] CECY (acute kidney injury) ([address] and Acute hypoxemic respiratory failure ([address] were also pertinent to this visit. Evaluating Therapist: [facility] PT        Room #:  4913/9241-A  Diagnosis:  Dehydration [E86.0]  Pyelonephritis [N12]  Septicemia ([address] [A41.9]  CECY (acute kidney injury) ([address] [N17.9]  Acute hypoxemic respiratory failure (HCC) [J96.01]  Nausea and vomiting, unspecified vomiting type [R11.2]  PMHx/PSHx:  CAD, DM  Precautions:  falls, O2     Social:  Pt lives alone in a 1 floor plan with laundry in basement. Independent with ww. States she does everything at home on her own, cooking, laundry, cleaning, driving, groceries. Reports falls at times at home. States since her knee surgery sometimes her legs just give out on her. Initial Evaluation  Date: 3/8/23 Treatment     3/9 Short Term/ Long Term   Goals   Was pt agreeable to Eval/treatment? yes  yes     Does pt have pain?  No c/o pain  no c/o pain     Bed Mobility  Rolling: SBA  Supine to sit: SBA  Sit to supine: SBA  Scooting: SBA  rolling:  Min A  Supine to sit:  Min A  Sit to supine:  NT  Scooting:  SBA to sitting EOB independent   Transfers Sit to stand: CGA  Stand to sit: CGA  Stand pivot: CGA  sit to stand:  CGA  Stand to sit:  CGA  Stand pivot:  NT independent   Ambulation    25 feet x2 with ww with CGA  15 feet and 30 feet with w/w Min A 100 feet with ww independent   Stair Negotiation  Ascended and descended  NT  NT  4-12 steps with 1 rail independent   LE strength     3+/5     4/5   balance      Fair with ww  sitting EOB:  independent  Standing with w/w:  Min A     AM-PAC Raw score 16/24 16/24        Vitals:  SPO2 prior to and after ambulation was 96% on O2. Patient education  Pt educated on PT objectives during treatment session, hand placement during transfers, safety during mobility. Patient response to education:   Pt verbalized understanding Pt demonstrated skill Pt requires further education in this area   yes With cueing yes     ASSESSMENT:    Comments:  Pt found in bed and left sitting up in the chair with call light in reach and chair alarm on. Treatment:  Patient practiced and was instructed in the following treatment:   Functional mobility performed as documented above. No report of dizziness during functional mobility. Cueing required for hand placement during transfers. No LOB during ambulation. Cueing required for safety during mobility. PLAN:    Patient is making good progress towards established goals. Will continue with current POC.      Time in  1040  Time out  1105    Total Treatment Time  20 minutes     CPT codes:    [x] Therapeutic activities 35146  20 minutes  [] Therapeutic exercises 08645  minutes      Verena Arora, [phone]

## 2023-03-09 NOTE — DISCHARGE INSTR - COC
Continuity of Care Form    Patient Name: Juanpablo Duarte   :  1935  MRN:  [id number]    [facility] date:  3/8/2023  Discharge date:  3/13/23    Code Status Order: Full Code   Advance Directives:     Admitting Physician:  Bobby Amor DO  PCP: Bobby Amor DO    Discharging Nurse: Cary Mendez RN    [address] Unit/Room#: 6982/4480-A  Discharging Unit Phone Number: [phone]      Emergency Contact:   Extended Emergency Contact Information  Primary Emergency Contact: SweetieromieMadan wilkerson  Home Phone: [phone]  Relation: Child  Secondary Emergency Contact: Enzo Abbott/Lety Escalante)   [address] Phone: [phone]  Mobile Phone: [phone]  Relation: Child    Past Surgical History:  Past Surgical History:   Procedure Laterality Date    ARTHROGRAPHY Left 2021    LEFT HIP INJECTION ARTHROGRAM performed by Angela Costa MD at [id number] [place] [address] Bilateral     HYSTERECTOMY (CERVIX STATUS UNKNOWN)      JOINT REPLACEMENT Right     hip    KNEE ARTHROPLASTY Left 2016    OTHER SURGICAL HISTORY Right 2013    hip arthrogram    PARATHYROID GLAND SURGERY  's    TOTAL HIP ARTHROPLASTY Left 2021    LEFT HIP TOTAL ARTHROPLASTY performed by Angela Costa MD at [address] [place] [address] Right 2/19/15    RIGHT TOTAL KNEE ARTHROPLASTY        Immunization History:   Immunization History   Administered Date(s) Administered    COVID-19, PFIZER Bivalent BOOSTER, DO NOT Dilute, (age 12y+), IM, 30 mcg/0.3 mL 2022    COVID-19, PFIZER GRAY top, DO NOT Dilute, (age 15 y+), IM, 30 mcg/0.3 mL 2022    COVID-19, PFIZER PURPLE top, DILUTE for use, (age 15 y+), 30mcg/0.3mL 2021, 2021, 2021       Active Problems:  Patient Active Problem List   Diagnosis Code    Primary osteoarthritis of right hip M16.11    Gout M10.9    Depression F32. A    Hyperlipidemia E78.5    HBP (high blood pressure) I10    Hypothyroid E03.9    H/O total hip arthroplasty (11-21-13: Hybrid R SHA)(Esequiel Lomeli MD) B22.535    CAD (coronary artery disease) I25.10    Primary osteoarthritis of right knee M17.11    S/P R total knee arthroplasty (2-19-15: Dr. Deana Reinoso) Q01.964    Primary osteoarthritis of left knee M17.12    S/P L total knee arthroplasty (7-19-16: Dannielle Zapata M.D.) L80.435    Diabetes mellitus [facility]) E11.9    Primary osteoarthritis of left hip M16.12    Pyelonephritis N12       Isolation/Infection:   Isolation            No Isolation          Patient Infection Status       Infection Onset Added Last Indicated Last Indicated By Review Planned Expiration Resolved Resolved By    None active    Resolved    COVID-19 (Rule Out) 03/08/23 03/08/23 03/08/23 COVID-19, Rapid (Ordered)   03/08/23 Rule-Out Test Resulted            Nurse Assessment:  Last Vital Signs: BP (!) 106/52   Pulse (!) 127   Temp 98.5 °F (36.9 °C) (Oral)   Resp 18   Ht 5' 1\" (1.549 m)   Wt 196 lb 3.2 oz (89 kg)   SpO2 94%   BMI 37.07 kg/m²     Last documented pain score (0-10 scale):    Last Weight:   Wt Readings from Last 1 Encounters:   03/08/23 196 lb 3.2 oz (89 kg)     Mental Status:  oriented, alert, coherent, and some forgetfulness at times    IV Access:  - None    Nursing Mobility/ADLs:  Walking   Assisted  Transfer  Assisted  Bathing  Assisted  Dressing  Assisted  Toileting  Assisted  Feeding    Med Admin  Assisted  Med Delivery   whole    Wound Care Documentation and Therapy:  Incision 08/19/21 Femoral Left;Lateral (Active)   Number of days: 567        Elimination:  Continence: Bowel: Yes  Bladder: Yes  Urinary Catheter: None   Colostomy/Ileostomy/Ileal Conduit: No       Date of Last BM: 3/13/23 fleets enema        Intake/Output Summary (Last 24 hours) at 3/9/2023 1422  Last data filed at 3/9/2023 1408  Gross per 24 hour   Intake 380 ml   Output 500 ml   Net -120 ml     I/O last 3 completed shifts:   In: 113.3 [IV Piggyback:113.3]  Out: -     Safety Concerns: At Risk for Falls    Impairments/Disabilities:      None    Nutrition Therapy:  Current Nutrition Therapy:   - Oral Diet:  Carb Control 4 carbs/meal (1800kcals/day)    Routes of Feeding: Oral  Liquids: Thin Liquids  Daily Fluid Restriction: no  Last Modified Barium Swallow with Video (Video Swallowing Test): not done    Treatments at the Time of Hospital Discharge:   Respiratory Treatments:   Oxygen Therapy:  is not on home oxygen therapy. Ventilator:    - No ventilator support    Rehab Therapies: Physical Therapy and Occupational Therapy  Weight Bearing Status/Restrictions: No weight bearing restrictions  Other Medical Equipment (for information only, NOT a DME order):  walker  Other Treatments:       Patient's personal belongings (please select all that are sent with patient):  Yann ROME SIGNATURE:  TIMA Spain RN    CASE MANAGEMENT/SOCIAL WORK SECTION    Inpatient Status Date: 3/8/2023    Readmission Risk Assessment Score:  Readmission Risk              Risk of Unplanned Readmission:  18           Discharging to Facility/ Agency   Name: [facility]  Address: [address] [place] [address]  Phone: [phone]  Fax: [phone]    Dialysis Facility (if applicable)   Name:  Address:  Dialysis Schedule:  Phone:  Fax:    / signature: Electronically signed by Alejandro Chavez RN on 3/9/2023 at 2:24 PM      PHYSICIAN SECTION    Prognosis: Good    Condition at Discharge: Stable    Rehab Potential (if transferring to Rehab): Good    Recommended Labs or Other Treatments After Discharge: ***    Physician Certification: I certify the above information and transfer of Emma Bowles  is necessary for the continuing treatment of the diagnosis listed and that she requires [facility] for less 30 days.      Update Admission H&P: {P DME Changes in FMOOL:[id number]}    PHYSICIAN SIGNATURE:  Electronically signed by Lian Moise DO on 3/13/23 at 5:57 AM EDT

## 2023-03-09 NOTE — PROGRESS NOTES
Occupational Therapy  OT BEDSIDE TREATMENT NOTE      Date:3/9/2023  Patient Name: Scarlet Potter  MRN: [id number]  : 1935  Room: [address] [place] [address]     Evaluating OT: Lizzeth Nicholas, OTR/L - [id number]     Referring Provider: Gena Lopez DO  Specific Provider Orders/Date: \"OT eval and treat\" - 3/8/2023     Diagnosis: Dehydration [E86.0]  Pyelonephritis [N12]  Septicemia (HCC) [A41.9]  CECY (acute kidney injury) (HCC) [N17.9]  Acute hypoxemic respiratory failure (HCC) [J96.01]  Nausea and vomiting, unspecified vomiting type [R11.2]     Patient reported that she has fallen twice recently at home.     Pertinent Medical History: CAD, OA, DM, depression, HTN, MI      Precautions: fall risk, O2     Assessment of Current Deficits:    [x] Functional mobility             [x]ADLs           [x] Strength                  [x]Cognition   [x] Functional transfers           [x] IADLs         [x] Safety Awareness   [x]Endurance   [] Fine Coordination              [x] Balance      [] Vision/perception   [x]Sensation     []Gross Motor Coordination  [] ROM           [] Delirium                   [] Motor Control      OT PLAN OF CARE   OT POC is based on physician orders, patient diagnosis, and results of clinical assessment.  Frequency/Duration 2-5 days/week for 2 weeks PRN   Specific OT Treatment Interventions to Include:   * Instruction/training on adapted ADL techniques and AE recommendations to increase functional independence within precautions       * Training on energy conservation strategies, correct breathing pattern and techniques to improve independence/tolerance for self-care routine  * Functional transfer/mobility training/DME recommendations for increased independence, safety, and fall prevention  * Patient/Family education to increase follow through with safety techniques and functional independence  * Recommendation of environmental modifications for increased safety with functional transfers/mobility and  ADLs  * Cognitive retraining/development of therapeutic activities to improve problem solving, judgement, memory, and attention for increased safety/participation in ADL/IADL tasks  * Therapeutic exercise to improve motor endurance, ROM, and functional strength for ADLs/functional transfers  * Therapeutic activities to facilitate/challenge dynamic balance, stand tolerance for increased safety and independence with ADLs  * Neuro-muscular re-education: facilitation of righting/equilibrium reactions, midline orientation, scapular stability/mobility, normalization of muscle tone, and facilitation of volitional active controled movement  * Positioning to improve skin integrity, interaction with environment and functional independence     Recommended Adaptive Equipment: continue to assess       Home Living: Patient lives alone in a one-floor home; laundry is in the basement. Bathroom Setup: walk-in shower in basement; patient noted that she is unable to get in/out of the tub shower on the main living level  Equipment Owned: cane     Prior Level of Function (PLOF): Patient reported that she was independent with ADLs, IADLs, and functional mobility (with use of cane when out of her home) prior to this hospitalization. Driving: Yes     Pain Level: Pt did not report pain. Cognition: Pt awake and alert. Cues for safety awareness and judgement. Impulsive at times. Functional Assessment:                  AM-PAC Daily Activity Raw Score: 16/24    Initial Eval Status  Date: 3/8/2023 Treatment Status  Date: 3/9/23  Short Term Goals = Long Term Goals   Feeding Setup   Independent   Grooming Min A for hand hygiene and oral care tasks while standing at sink. Min A for balance while standing at the sink to wash her hands. Mod I (seated/standing at sink)   UB Dressing Min A Min A   Mod I (including item retrieval)   LB Dressing Max A needed to adjust socks. Mod A   Assist to thread brief over feet.    Min  A to arrange over hips.     Min A - with use of AE, as needed/appropriate   Bathing Max A   Min A - with use of AE/DME, as needed/appropriate   Toileting Min A with toilet transfer; SBA for hygiene while seated. Min A thorough lilian hygiene. Assist for lilian hygiene. Mod I / Independent   Bed Mobility  Supine-to-Sit: SBA  Sit-to-Supine: SBA  Min A supine to sit   Independent in order to maximize patient's independence with ADLs, re-positioning, and other functional tasks. Functional Transfers Sit-to-Stand: Min A   from EOB and toilet  min A from bed, chair, and toilet. Cues for hand placement to increase safety technique. Independent   Functional Mobility Min A (with walker) within patient's room and bathroom. Cues needed to maximize safety with management of walker, particularly during ADLs. Assistance needed with management of O2 line. Min A using w/w to and from bathroom. Mod I with functional mobility (with device, as needed/appropriate) in order to maximize independence with ADLs/IADLs and other functional tasks. Balance Sitting: Good (at EOB)  Standing: Fair- (with walker) Min A static standing during ADL. Fair+ dynamic standing balance during completion of ADLs/IADLs and other functional tasks. Activity Tolerance Fair  Patient demonstrated fatigue with ~10 minutes of static/dynamic standing activities during completion of ADLs. Limited   Patient will demonstrate Good understanding and consistent implementation of energy conservation techniques and work simplification techniques into ADL/IADL routines. B UE Strength 3+/5   Patient will demonstrate 4/5 B UE strength in order to maximize independence with ADLs/IADLs and functional transfers     Comments:  Pt laying in the bed. Agreeable to therapy. O2 monitored during this session. 96% at rest and 96% after activity. Pt remained seated in the chair with alarm activated.      Education/treatment:  ADL retraining with facilitation of movement to increase self care skills. Therapeutic activity to address balance and endurance for ADL and transfers. Pt education of transfer safety, walker safety, and daily orientation. Pt has made  progress towards set goals.        Time In: 10:40   Time Out: 11:10      Min Units   Therapeutic Ex [id number]     Therapeutic Activities 04300 10 1   ADL/Self Care 46380 20 1   Orthotic Management 01801     Neuro Re-Ed 42938     Non-Billable Time     TOTAL TIMED TREATMENT 30 [facility] LISSET/L [id number]

## 2023-03-09 NOTE — CARE COORDINATION
PT am-pac 16. Awaiting acceptance to [facility]- will need insurance precert. + Blood clx- ID consulted- continues on iv abx- await final ID plan. Requiring O2 1lNC. Will need COVID test on day of discharge. WILL NEED NOAH in epic, HENS and ambulette transport form completed once accepted at a snf. Will follow Bobby Dubois RN case manager    3513: [facility] unable to accept. Pt chose 2) [facility]- no beds 3) [facility]- referral made- awaiting acceptance- instructed to initiate precert if accepted. Bobby Dubois RN case manager     The Plan for Transition of Care is related to the following treatment goals: physical rehab    The Patient and/or patient representative Brianna Ardon was provided with a choice of provider and agrees   with the discharge plan. [x] Yes [] No    Freedom of choice list was provided with basic dialogue that supports the patient's individualized plan of care/goals, treatment preferences and shares the quality data associated with the providers.  [x] Yes [] No

## 2023-03-09 NOTE — PROGRESS NOTES
Progress Note  Date:3/9/2023       Room:[facility]8041-A  Patient Wenceslao Murguia     YOB: 1935     Age:88 y.o. Patient seen for follow up of sepsis. Patient markedly hypotensive during the day yesterday. Was given bolus in evening with good response to fluids. Early this am patient went into afib with RVR. Bolus given for heart rate and then metoprolol which slowed her down to 110s. Cardiology was consulted. Patient this am still with hr in 110s-140s. She is laying in bed. She states she feels ok. She denies any chest pains or palpitations. She is currently on 2 liters of oxygen. She denies any lightheadedness, dizziness, nausea, vomiting or diarrhea. She has not had a good bowel movement in a few days. Subjective    Subjective:  Symptoms:  Improved. She reports shortness of breath and weakness. No cough, chest pain, headache, chest pressure or diarrhea. Diet:  No nausea or vomiting. Review of Systems   Respiratory:  Positive for shortness of breath. Negative for cough. Cardiovascular:  Negative for chest pain. Gastrointestinal:  Negative for diarrhea, nausea and vomiting. Neurological:  Positive for weakness. Objective         Vitals Last 24 Hours:  TEMPERATURE:  Temp  Av.5 °F (36.9 °C)  Min: 98 °F (36.7 °C)  Max: 99.2 °F (37.3 °C)  RESPIRATIONS RANGE: Resp  Av.7  Min: 16  Max: 18  PULSE OXIMETRY RANGE: SpO2  Av %  Min: 98 %  Max: 100 %  PULSE RANGE: Pulse  Av.9  Min: 70  Max: 130  BLOOD PRESSURE RANGE: Systolic (91YXL), MZF:82 , Min:81 , RICHMOND:348   ; Diastolic (56XOU), HR, Min:43, Max:58    I/O (24Hr): No intake or output data in the 24 hours ending 23 0624  Objective:  General Appearance:  Comfortable, in no acute distress and well-appearing. Vital signs: (most recent): Blood pressure (!) 92/45, pulse (!) 128, temperature 99.2 °F (37.3 °C), temperature source Oral, resp.  rate 18, height 5' 1\" (1.549 m), weight 196 lb 3.2 oz (89 kg), SpO2 100 %.    Lungs:  Normal effort and normal respiratory rate. There are decreased breath sounds. No rales or rhonchi. Heart: Tachycardia. Irregular rhythm. S1 normal and S2 normal.  No gallop. Abdomen: Abdomen is soft and non-distended. Bowel sounds are normal.   There is no abdominal tenderness. There is no rebound tenderness. There is no guarding. Extremities: Normal range of motion. There is no dependent edema. Skin:  Warm and dry. Labs/Imaging/Diagnostics    Labs:  CBC:  Recent Labs     03/08/23 0345 03/09/23 0343   WBC 8.9 9.3   RBC 4.14 3.48*   HGB 12.9 10.7*   HCT 40.9 34.4   MCV 98.8 98.9   RDW 14.4 14.6    153     CHEMISTRIES:  Recent Labs     03/08/23 0345 03/09/23 0343    136   K 4.6 4.0    108*   CO2 20* 21*   BUN 20 23   CREATININE 1.2* 1.3*   GLUCOSE 252* 143*     PT/INR:No results for input(s): PROTIME, INR in the last 72 hours. APTT:No results for input(s): APTT in the last 72 hours. LIVER PROFILE:  Recent Labs     03/08/23 0345 03/09/23 0343   AST 12 11   ALT 7 7   BILIDIR 0.9*  --    BILITOT 1.9* 0.6   ALKPHOS 76 61       Imaging Last 24 Hours:  XR HIP BILATERAL W AP PELVIS (2 VIEWS)    Result Date: 3/8/2023  EXAMINATION: ONE XRAY VIEW OF THE PELVIS AND TWO XRAY VIEWS OF EACH OF THE BILATERAL HIPS 3/8/2023 5:14 am COMPARISON: CT study done the same day HISTORY: ORDERING SYSTEM PROVIDED HISTORY: r/o fx TECHNOLOGIST PROVIDED HISTORY: Reason for exam:->r/o fx FINDINGS: Bilateral hip arthroplasty. No periprosthetic fracture is identified. Hardware appears in appropriate alignment. Degenerative change in the pubic symphysis in lumbar spine. Phleboliths are noted in the pelvis. 1. Intact bilateral total hip arthroplasties, with no periprosthetic fracture or hardware complication.      CT HEAD WO CONTRAST    Result Date: 3/8/2023  EXAMINATION: CT OF THE HEAD WITHOUT CONTRAST; CT OF THE CERVICAL SPINE WITHOUT CONTRAST 3/8/2023 5:04 am TECHNIQUE: CT of the head was performed without the administration of intravenous contrast. Automated exposure control, iterative reconstruction, and/or weight based adjustment of the mA/kV was utilized to reduce the radiation dose to as low as reasonably achievable.; CT of the cervical spine was performed without the administration of intravenous contrast. Multiplanar reformatted images are provided for review. Automated exposure control, iterative reconstruction, and/or weight based adjustment of the mA/kV was utilized to reduce the radiation dose to as low as reasonably achievable. COMPARISON: 08/21/2021 HISTORY: ORDERING SYSTEM PROVIDED HISTORY: ams TECHNOLOGIST PROVIDED HISTORY: Reason for exam:->ams Has a \"code stroke\" or \"stroke alert\" been called? ->No Decision Support Exception - unselect if not a suspected or confirmed emergency medical condition->Emergency Medical Condition (MA); ORDERING SYSTEM PROVIDED HISTORY: fall multi days ago, now weak, vomiting, cant walk TECHNOLOGIST PROVIDED HISTORY: Reason for exam:->fall multi days ago, now weak, vomiting, cant walk Decision Support Exception - unselect if not a suspected or confirmed emergency medical condition->Emergency Medical Condition (MA) FINDINGS: CT HEAD: BRAIN/VENTRICLES: There is no acute intracranial hemorrhage, mass effect or midline shift. No abnormal extra-axial fluid collection. The gray-white differentiation is maintained without evidence of an acute infarct. There is no evidence of hydrocephalus. 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 Patchy white matter low attenuation is identified within the periventricular and subcortical white matter. Age related mild generalized cortical atrophy. Intracranial atherosclerosis. ORBITS: Orbits appear unremarkable. No acute abnormality. PARANASAL SINUSES: No acute air-fluid level seen in the visualized paranasal sinuses or mastoid air cells.  SOFT TISSUE/CALVARIUM: The bony calvarium appears intact without fracture. No large soft tissue hematoma is seen. CT CERVICAL SPINE: BONES/ALIGNMENT: No cervical spine fracture. Occipital condyles appear intact. C1 ring appears intact. DEGENERATIVE CHANGES: Multilevel degenerative disc disease and facet arthropathy is identified. Posterior vertebral body alignment appears intact. Degenerative changes seen at the C1-C2 articulation. SOFT TISSUES: No apical pneumothorax. No acute subcutaneous soft tissue abnormality is seen. Carotid calcifications are identified. 1. No acute intracranial abnormality is identified. 2.  Generalized age-related cortical atrophy, with intracranial atherosclerosis and CT findings suggestive of chronic microvascular ischemic change. . 3. 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 4. Degenerative changes in the cervical spine without acute osseous fracture. CT CERVICAL SPINE WO CONTRAST    Result Date: 3/8/2023  EXAMINATION: CT OF THE HEAD WITHOUT CONTRAST; CT OF THE CERVICAL SPINE WITHOUT CONTRAST 3/8/2023 5:04 am TECHNIQUE: CT of the head was performed without the administration of intravenous contrast. Automated exposure control, iterative reconstruction, and/or weight based adjustment of the mA/kV was utilized to reduce the radiation dose to as low as reasonably achievable.; CT of the cervical spine was performed without the administration of intravenous contrast. Multiplanar reformatted images are provided for review. Automated exposure control, iterative reconstruction, and/or weight based adjustment of the mA/kV was utilized to reduce the radiation dose to as low as reasonably achievable. COMPARISON: 08/21/2021 HISTORY: ORDERING SYSTEM PROVIDED HISTORY: ams TECHNOLOGIST PROVIDED HISTORY: Reason for exam:->ams Has a \"code stroke\" or \"stroke alert\" been called? ->No Decision Support Exception - unselect if not a suspected or confirmed emergency medical condition->Emergency Medical Condition (MA); ORDERING SYSTEM PROVIDED HISTORY: fall multi days ago, now weak, vomiting, cant walk TECHNOLOGIST PROVIDED HISTORY: Reason for exam:->fall multi days ago, now weak, vomiting, cant walk Decision Support Exception - unselect if not a suspected or confirmed emergency medical condition->Emergency Medical Condition (MA) FINDINGS: CT HEAD: BRAIN/VENTRICLES: There is no acute intracranial hemorrhage, mass effect or midline shift. No abnormal extra-axial fluid collection. The gray-white differentiation is maintained without evidence of an acute infarct. There is no evidence of hydrocephalus. 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 Patchy white matter low attenuation is identified within the periventricular and subcortical white matter. Age related mild generalized cortical atrophy. Intracranial atherosclerosis. ORBITS: Orbits appear unremarkable. No acute abnormality. PARANASAL SINUSES: No acute air-fluid level seen in the visualized paranasal sinuses or mastoid air cells. SOFT TISSUE/CALVARIUM: The bony calvarium appears intact without fracture. No large soft tissue hematoma is seen. CT CERVICAL SPINE: BONES/ALIGNMENT: No cervical spine fracture. Occipital condyles appear intact. C1 ring appears intact. DEGENERATIVE CHANGES: Multilevel degenerative disc disease and facet arthropathy is identified. Posterior vertebral body alignment appears intact. Degenerative changes seen at the C1-C2 articulation. SOFT TISSUES: No apical pneumothorax. No acute subcutaneous soft tissue abnormality is seen. Carotid calcifications are identified. 1. No acute intracranial abnormality is identified. 2.  Generalized age-related cortical atrophy, with intracranial atherosclerosis and CT findings suggestive of chronic microvascular ischemic change. . 3. 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 4. Degenerative changes in the cervical spine without acute osseous fracture. CT ABDOMEN PELVIS W IV CONTRAST Additional Contrast? None    Result Date: 3/8/2023  EXAMINATION: CT OF THE ABDOMEN AND PELVIS WITH CONTRAST 3/8/2023 5:04 am TECHNIQUE: CT of the abdomen and pelvis was performed with the administration of intravenous contrast. Multiplanar reformatted images are provided for review. Automated exposure control, iterative reconstruction, and/or weight based adjustment of the mA/kV was utilized to reduce the radiation dose to as low as reasonably achievable. COMPARISON: 01/27/2012 HISTORY: ORDERING SYSTEM PROVIDED HISTORY: febrile, n/v, ill appearing, gen abd pain TECHNOLOGIST PROVIDED HISTORY: Reason for exam:->febrile, n/v, ill appearing, gen abd pain Additional Contrast?->None Decision Support Exception - unselect if not a suspected or confirmed emergency medical condition->Emergency Medical Condition (MA) FINDINGS: Lower Chest: No cardiomegaly. Small hiatal hernia. Mitral annular calcifications. Coronary artery atherosclerosis. 4 mm rounded pulmonary nodule seen in the right lower lobe, axial image 21. Organs: Periportal low attenuation. Tiny subcapsular probable cyst in the left hepatic lobe. No splenic mass is identified. No adrenal mass. No pancreatic mass. Pancreatic calcifications related to chronic pancreatitis. No intraluminal calcified stones in the gallbladder. Nonobstructive calculus in the right kidney. No left-sided renal calculi. Limited evaluation of the pelvis and distal ureters given orthopedic hardware artifact. GI/Bowel: Colonic diverticulosis. No ileus or obstruction. Pelvis: No pelvic mass is definitively seen, though limited evaluation. Bladder is grossly unremarkable.   Extensive streak artifact related to hardware from bilateral hip arthroplasty. No pelvic fracture is identified. Degenerative changes seen in the sacroiliac joints. Peritoneum/Retroperitoneum: Aorto iliac atherosclerosis. No retroperitoneal or mesenteric lymphadenopathy. Small lymph nodes are noted. No bowel herniation. Bones/Soft Tissues: No acute subcutaneous soft tissue abnormality. No acute osseous abnormality. Moderate degenerative changes seen throughout the lower thoracic and lumbar spine. Posterior vertebral body alignment appears intact. 1. Periportal low attenuation is seen within the liver with mild heterogeneous enhancement. Correlate with liver enzymes as hepatitis could have this appearance. 2. Nonobstructive right renal calculi. No significant hydroureteronephrosis is identified. Mild stranding seen adjacent to the ureters. Correlate for possible ascending urinary tract infection. Bladder not well assessed given pelvic artifact. 3. Small hiatal hernia. 4. Atherosclerosis including coronary artery involvement. 5. There is a 4 mm pulmonary nodule in the right lower lobe. No follow-up imaging recommended. Reference below. 6. Other nonacute incidental findings as above. RECOMMENDATIONS: 4 mm right solid pulmonary nodule. No routine follow-up imaging is recommended per Fleischner Society Guidelines. These guidelines do not apply to immunocompromised patients and patients with cancer. Follow up in patients with significant comorbidities as clinically warranted. For lung cancer screening, adhere to Lung-RADS guidelines. Reference: Radiology. 2017; 284(1):228-43. XR CHEST PORTABLE    Result Date: 3/8/2023  EXAMINATION: ONE XRAY VIEW OF THE CHEST 3/8/2023 5:14 am COMPARISON: 07/14/2016 HISTORY: ORDERING SYSTEM PROVIDED HISTORY: eval pna TECHNOLOGIST PROVIDED HISTORY: Reason for exam:->eval pna FINDINGS: Cardiac and mediastinal silhouettes appear similar. Elevated right hemidiaphragm. Aortic atherosclerosis. No focal infiltrate. No pleural effusion.  No pneumothorax.  No acute osseous abnormality.  Degenerative changes are seen throughout the spine.  Central bronchial thickening.     Aortic atherosclerosis. Central bronchial thickening which may be related to peribronchial edema or inflammatory change.     Assessment//Plan           Hospital Problems             Last Modified POA    * (Principal) Pyelonephritis 3/8/2023 Yes     Assessment & Plan  Sepsis secondary to complicated UTI   Afib with rvr   Dehydration  Lactic acidosis-resolved  Fall   Gait dysfunction  Type II diabetes mellitus Hba1c 7.0 on 2/21/23.     Continue on IVF. Check TSH, ECHOcardiogram and magnesium level. Cardiology consulted. Ordered cardizem bolus and infusion but need to monitor pressure closely. Urine and blood cultures still pending. Continue on rocephin. Continue to hold metoprolol and lisinopril due to hypotension. Increase lantus due to hyperglycemia.     Gena Lopez DO    Electronically signed by Gena Lopez DO on 3/9/23 at 6:24 AM EST

## 2023-03-09 NOTE — CONSULTS
Inpatient Cardiology Consultation      Reason for Consult: New onset A-fib RVR    Consulting Physician: Dr Tomasz Garibay    Requesting Physician:  Sid Valderrama DO    Date of Consultation: 3/9/2023    HISTORY OF PRESENT ILLNESS:   Patient is an 43-year-old female who is unknown to [facility] cardiology previously. HPI:  Patient presented to ER 3/8/2023 at 5:52 AM for abdominal pain, nausea and vomiting. Patient reports waking up at 2:30 AM vomiting. Daughter-in-law reporting patient has been mildly confused and apparently had a fall 5 days prior. Patient was febrile with 103 temp. Patient did have hypoxia in ER to 88%. Patient found to have UTI and started on ATB. Patient admitted for pyelonephritis with septicemia and CECY. Patient was noted to go into A-fib with RVR early in the morning 3/9. Patient was given IV bolus of metoprolol which slowed her heart rate to the 110s. Cardiology was consulted. Patient found to be bacteremic with 4/4 blood cultures positive for E. coli by PCR. VS upon arrival 103 Fahrenheit, 18 respirations, 89 pulse, 138/105, 93% on room air. Labs: Potassium 4.0, chloride 108, CO2 21, BUN 23, creatinine 1.2>1.3, GFR 40, magnesium 1.7, lactate 2.5> 2.7> 1.8, glucose 143, serum calcium 8.1, total protein 5.2, total CK 88, proBNP 8522, troponin 27> 32, albumin 2.7, TSH 0.78, WBC 9.3, H&H 12.9>10.7/34.3, platelet 862, blood cultures positive for E. coli, UA infectious  CXR: Aortic atherosclerosis. Central bronchial thickening which may be related to peribronchial edema or inflammatory change. Bilateral x-ray hip: Intact total hip arthroplasties with no fracture or hardware complication. CT head: No acute change. 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 CT abdomen: Periportal low-attenuation is seen within the liver with mild heterogeneous enhancement. Correlate with liver enzymes as hepatitis could have this appearance.   Nonobstructive right renal calculi. Mild stranding seen adjacent to ureters. Small hiatal hernia. Atherosclerosis including coronary artery involvement. 4 mm pulmonary nodule in the right lower lobe. Upon assessment today 3/9/2023 patient is lying Semi-Chong in hospital bed on 1 L nasal cannula. Patient is alert and oriented, speaking full sentences, is no apparent distress this time. Patient reports she had a fall 5 days ago that she believes was mechanical and resulted in no head injury or LOC. Patient reports the day prior to arrival she woke up in the melanite moaning and abdominal pain which prompted her to come to the ER. She has since been diagnosed with a UTI and now bacteremia of E. coli. Patient went into atrial fibrillation with RVR earlier this morning and has remained in A-fib RVR with lower heart rate in the 100s. Patient is on diltiazem at 2.5 Mg/hour but not tolerating due to hypotension. Diltiazem was stopped after blood pressure revealed systolic blood pressure of 80. Patient was educated on atrial fibrillation and sequela of treatment options. Patient is acceptable to anticoagulation, but is refusing any ischemic evaluation as she does not want any eventual invasive procedures or stenting if needed. Patient is agreeable for echocardiogram and medical management. Patient reports she does fall a couple times a year, but has no history of spontaneous bleeding or GI bleed. Echocardiogram has been ordered by primary service. Patient reports she quit smoking \"a long time ago\", does not use alcohol or drugs. Patient reports she is compliant with medications at home. Intake and output: -500 mL    Most recent VS 98.1, 18 respirations, 113 pulse, 116/71, 98% on 1 L nasal cannula. Please note: past medical records were reviewed per electronic medical record (EMR) - see detailed reports under Past Medical/ Surgical History. Past Medical History:    CAD:  Lexiscan 7/2016: Normal imaging.   No ischemia. [facility] 1997 and 1998: Further details unknown. Patient reports she does not know why she had heart cath done but she had no stents. Hypertension  Hyperlipidemia  IDDM  Hypothyroidism  GERD  History of blood clots>> patient denies history of blood clots  History of nephrolithiasis  Vitamin D deficiency  Arthritis  Use of cane  Depression    Past Surgical History:    Past Surgical History:   Procedure Laterality Date    ARTHROGRAPHY Left 5/20/2021    LEFT HIP INJECTION ARTHROGRAM performed by Sharon Nevarez MD at [id number] [facility] Bilateral     HYSTERECTOMY (CERVIX STATUS UNKNOWN)      JOINT REPLACEMENT Right 2013    hip    KNEE ARTHROPLASTY Left 07/19/2016    OTHER SURGICAL HISTORY Right 5/23/2013    hip arthrogram    PARATHYROID GLAND SURGERY  1990's    TOTAL HIP ARTHROPLASTY Left 8/19/2021    LEFT HIP TOTAL ARTHROPLASTY performed by Sharon Nevarez MD at [address] [place] [address] Right 2/19/15    RIGHT TOTAL KNEE ARTHROPLASTY        Medications Prior to admit:  Prior to Admission medications    Medication Sig Start Date End Date Taking?  Authorizing Provider   levothyroxine (SYNTHROID) 125 MCG tablet Take 125 mcg by mouth Daily    Historical Provider, MD   aspirin 325 MG tablet Take 325 mg by mouth daily Last dose 8-12-21    Historical Provider, MD   omeprazole (PRILOSEC) 20 MG delayed release capsule Take 20 mg by mouth daily Instructed to take morning of surgery with a sip of water    Historical Provider, MD   [address] [place] [address]  2/16/21   Historical Provider, MD   lidocaine (LIDODERM) 5 % Place 1 patch onto the skin daily 12 hours on, 12 hours off. 8/1/18   ION Paul - CNP   cholestyramine light 4 G packet Take 4 g by mouth daily     Historical Provider, MD   isosorbide mononitrate (IMDUR) 30 MG CR tablet Take 30 mg by mouth daily Instructed to take morning of surgery with a sip of water    Historical Provider, MD   buPROPion SR (WELLBUTRIN SR) 150 MG SR tablet Take 150 mg by mouth 2 times daily Instructed to take morning of surgery with a sip of water    Historical Provider, MD   lisinopril (PRINIVIL;ZESTRIL) 10 MG tablet Take 10 mg by mouth daily. Historical Provider, MD   Metoprolol Tartrate (LOPRESSOR PO) Take 50 mg by mouth 2 times daily Instructed to take morning of surgery with a sip of water    Historical Provider, MD   Potassium Citrate (UROCIT-K 10 PO) Take 2 tablets by mouth 2 times daily     Historical Provider, MD   allopurinol (ZYLOPRIM) 300 MG tablet Take 300 mg by mouth daily.  Taking for kidney stones    Historical Provider, MD   Cholecalciferol (VITAMIN D3) 2000 UNITS CAPS Take 2,000 Units by mouth daily Last dose 08/12/21    Historical Provider, MD   multivitamin-iron-minerals-folic acid (CENTRUM) chewable tablet Take 1 tablet by mouth daily Last dose 8-12-21    Historical Provider, MD       Current Medications:    Current Facility-Administered Medications: perflutren lipid microspheres (DEFINITY) injection 1.5 mL, 1.5 mL, IntraVENous, ONCE PRN  [COMPLETED] dilTIAZem injection 10 mg, 10 mg, IntraVENous, Once **FOLLOWED BY** dilTIAZem 100 mg in dextrose 5 % 100 mL infusion (ADD-Empire), 2.5-15 mg/hr, IntraVENous, Continuous  sodium chloride flush 0.9 % injection 10 mL, 10 mL, IntraVENous, 2 times per day  sodium chloride flush 0.9 % injection 10 mL, 10 mL, IntraVENous, PRN  0.9 % sodium chloride infusion, , IntraVENous, PRN  potassium chloride (KLOR-CON M) extended release tablet 40 mEq, 40 mEq, Oral, PRN **OR** potassium bicarb-citric acid (EFFER-K) effervescent tablet 40 mEq, 40 mEq, Oral, PRN **OR** potassium chloride 10 mEq/100 mL IVPB (Peripheral Line), 10 mEq, IntraVENous, PRN  enoxaparin (LOVENOX) injection 40 mg, 40 mg, SubCUTAneous, Daily  ondansetron (ZOFRAN-ODT) disintegrating tablet 4 mg, 4 mg, Oral, Q8H PRN **OR** ondansetron (ZOFRAN) injection 4 mg, 4 mg, IntraVENous, Q6H PRN  senna (SENOKOT) tablet 8.6 mg, 1 tablet, Oral, Daily PRN  acetaminophen (TYLENOL) tablet 650 mg, 650 mg, Oral, Q6H PRN **OR** acetaminophen (TYLENOL) suppository 650 mg, 650 mg, Rectal, Q6H PRN  insulin lispro (HUMALOG) injection vial 0-4 Units, 0-4 Units, SubCUTAneous, TID WC  insulin lispro (HUMALOG) injection vial 0-4 Units, 0-4 Units, SubCUTAneous, Nightly  glucose chewable tablet 16 g, 4 tablet, Oral, PRN  dextrose bolus 10% 125 mL, 125 mL, IntraVENous, PRN **OR** dextrose bolus 10% 250 mL, 250 mL, IntraVENous, PRN  glucagon injection 1 mg, 1 mg, SubCUTAneous, PRN  dextrose 10 % infusion, , IntraVENous, Continuous PRN  buPROPion (WELLBUTRIN SR) extended release tablet 150 mg, 150 mg, Oral, BID  cholestyramine light packet 4 g, 4 g, Oral, Daily  [Held by provider] isosorbide mononitrate (IMDUR) extended release tablet 30 mg, 30 mg, Oral, Daily  levothyroxine (SYNTHROID) tablet 125 mcg, 125 mcg, Oral, Daily  pantoprazole (PROTONIX) tablet 40 mg, 40 mg, Oral, QAM AC  0.9 % sodium chloride infusion, , IntraVENous, Continuous  aspirin chewable tablet 81 mg, 81 mg, Oral, Daily  cefTRIAXone (ROCEPHIN) 1,000 mg in sterile water 10 mL IV syringe, 1,000 mg, IntraVENous, Q24H  insulin glargine (LANTUS) injection vial 5 Units, 5 Units, SubCUTAneous, Nightly    Allergies:  Patient has no known allergies. Social History:    Social History     Socioeconomic History    Marital status:       Spouse name: Not on file    Number of children: Not on file    Years of education: Not on file    Highest education level: Not on file   Occupational History    Not on file   Tobacco Use    Smoking status: Former     Types: Cigarettes     Quit date: 1974     Years since quittin.8    Smokeless tobacco: Never   Vaping Use    Vaping Use: Never used   Substance and Sexual Activity    Alcohol use: Not Currently     Comment: very rare    Drug use: No    Sexual activity: Not on file   Other Topics Concern    Not on file Social History Narrative    Not on file     Social Determinants of Health     Financial Resource Strain: Not on file   Food Insecurity: Not on file   Transportation Needs: Not on file   Physical Activity: Not on file   Stress: Not on file   Social Connections: Not on file   Intimate Partner Violence: Not on file   Housing Stability: Not on file       Family History:   No family history on file. REVIEW OF SYSTEMS:     Constitutional: Denies fevers, chills, night sweats. Generalized fatigue and weakness  HEENT: Denies headaches, nose bleeds, and blurred vision,oral pain, abscess or lesion. Musculoskeletal: Denies pain to BLE with ambulation and edema to BLE. Reports recent fall 5 days ago that she was believed was due to mechanical with no LOC or head injury  Neurological: Denies dizziness and lightheadedness, numbness and tingling  Cardiovascular: Denies chest pain, palpitations, and feelings of heart racing. Respiratory: Denies orthopnea and PND, SOB/ACEVEDO  Gastrointestinal: Denies heartburn, nausea/vomiting, diarrhea and constipation, black/bloody, and tarry stools. Genitourinary: Denies dysuria and hematuria  Hematologic: Denies excessive bruising or bleeding  Lymphatic: Denies lumps and bumps to neck, axilla, breast, and groin  Endocrine: Denies excessive thirst. Denies intolerance to hot and cold  GYN: Postmenopausal state; Denies vaginal bleeding. Psychiatric: Denies anxiety and depression. PHYSICAL EXAM:   /71   Pulse (!) 113   Temp 98.1 °F (36.7 °C) (Axillary)   Resp 18   Ht 5' 1\" (1.549 m)   Wt 196 lb 3.2 oz (89 kg)   SpO2 98%   BMI 37.07 kg/m²   CONST:  Well developed, obese 74-year-old  female who appears stated age. Awake, alert, cooperative, no apparent distress  HEENT:   Head- Normocephalic, atraumatic   Eyes- Conjunctivae pink, anicteric  Throat- Oral mucosa pink and moist  Neck-  No stridor, trachea midline, no jugular venous distention.  No adenopathy   CHEST: Chest symmetrical and non-tender to palpation. No accessory muscle use or intercostal retractions  RESPIRATORY: Lung sounds - clear throughout fields   CARDIOVASCULAR:     No carotid bruit  Heart Inspection- shows no noted pulsations  Heart Palpation- no heaves or thrills; PMI is non-displaced   Heart Ausculation-irregularly irregular rate and rhythm, no murmur. No s3, s4 or rub   PV: No lower extremity edema. No varicosities. Pedal pulses palpable, no clubbing or cyanosis   ABDOMEN: Soft, non-tender to light palpation. Bowel sounds present. No palpable masses no organomegaly; no abdominal bruit  MS: Good muscle strength and tone. No atrophy or abnormal movements. : Deferred  SKIN: Warm and dry no statis dermatitis or ulcers   NEURO / PSYCH: Oriented to person, place and time. Speech clear and appropriate. Follows all commands. Pleasant affect     DATA:    ECG: AF with RVR. Vent rate 126, QRS duration 90, QTc 405.   Tele strips: AF with RVR, heart rate 100-110 bpm  Diagnostic:      Intake/Output Summary (Last 24 hours) at 3/9/2023 0907  Last data filed at 3/9/2023 0809  Gross per 24 hour   Intake --   Output 500 ml   Net -500 ml       Labs:   CBC:   Recent Labs     03/08/23 0345 03/09/23 0343   WBC 8.9 9.3   HGB 12.9 10.7*   HCT 40.9 34.4    153     BMP:   Recent Labs     03/08/23 0345 03/09/23 0343    136   K 4.6 4.0   CO2 20* 21*   BUN 20 23   CREATININE 1.2* 1.3*   LABGLOM 44 40   CALCIUM 9.3 8.1*     Mag:   Recent Labs     03/09/23  0343   MG 1.7     TSH:   Recent Labs     03/09/23 0343   TSH 0.780       CARDIAC ENZYMES:  Recent Labs     03/08/23 0345   CKTOTAL 88       LIVER PROFILE:  Recent Labs     03/08/23 0345 03/09/23 0343   AST 12 11   ALT 7 7   LABALBU 3.5 2.7*      Latest Reference Range & Units 3/8/23 03:59   Bottle Type  Aerobic   Haemophilus Influenzae by PCR Not Detected  Not Detected   Listeria monocytogenes by PCR Not Detected  Not Detected   Neisseria meningitidis by PCR Not Detected  Not Detected   Carbapenem Resistance KPC by PCR Not Detected  Not Detected   Staphylococcus species by PCR Not Detected  Not Detected   Staphylococcus aureus by PCR Not Detected  Not Detected   Streptococcus species by PCR Not Detected  Not Detected   Streptococcus agalactiae by PCR Not Detected  Not Detected   Streptococcus pneumoniae by PCR Not Detected  Not Detected   Streptococcus pyogenes  by PCR Not Detected  Not Detected   Enterobacteriaceae by PCR Not Detected  DETECTED !! Enterobacter cloacae complex by PCR Not Detected  Not Detected   Escherichia coli by PCR Not Detected  DETECTED !! Klebsiella oxytoca by PCR Not Detected  Not Detected   Klebsiella pneumoniae group by PCR Not Detected  Not Detected   Serratia marcescens by PCR Not Detected  Not Detected   Pseudomonas aeruginosa by PCR Not Detected  Not Detected   Candida albicans by PCR Not Detected  Not Detected   Candida glabrata by PCR Not Detected  Not Detected   Candida krusei by PCR Not Detected  Not Detected   Candida parapsilosis by PCR Not Detected  Not Detected   Candida tropicalis by PCR Not Detected  Not Detected   !!: Data is critical   Latest Reference Range & Units 3/9/23 03:43   TSH 0.270 - 4.200 uIU/mL 0.780      Latest Reference Range & Units 3/8/23 03:45 3/8/23 05:42 3/9/23 03:43   Total CK 20 - 180 U/L 88     Pro-BNP 0 - 450 pg/mL 8,522 (H)     Troponin, High Sensitivity 0 - 9 ng/L 27 (H) 32 (H) 34 (H)   (H): Data is abnormally high    CT abdomen:  Impression   1. Periportal low attenuation is seen within the liver with mild   heterogeneous enhancement. Correlate with liver enzymes as hepatitis could   have this appearance. 2. Nonobstructive right renal calculi. No significant hydroureteronephrosis   is identified. Mild stranding seen adjacent to the ureters. Correlate for   possible ascending urinary tract infection. Bladder not well assessed given   pelvic artifact. 3. Small hiatal hernia.    4. Atherosclerosis including coronary artery involvement. 5. There is a 4 mm pulmonary nodule in the right lower lobe. No follow-up   imaging recommended. Reference below. 6. Other nonacute incidental findings as above. CT head:  Impression   1. No acute intracranial abnormality is identified. 2.  Generalized age-related cortical atrophy, with intracranial   atherosclerosis and CT findings suggestive of chronic microvascular ischemic   change. .   3. Focal left cerebellar hemisphere encephalomalacia related to remote   infarct, also noted on the previous scan though less conspicuous given   calvarial artifact. 4. Degenerative changes in the cervical spine without acute osseous fracture. [id number] Score for Atrial Fibrillation Stroke Risk   Risk   Factors  Component Value   C CHF No 0   H HTN Yes 1   A2 Age >= 76 Yes,  (82 y.o.) 2   D DM Yes 1   S2 Prior Stroke/TIA No 0   V Vascular Disease No 0   A Age 74-69 No,  (82 y.o.) 0   Sc Sex female 1    [id number]  Score  5   Score last updated 3/9/23 6:45 PM EST    Click here for a link to the UpToDate guideline \"Atrial Fibrillation: Anticoagulation therapy to prevent embolization    Disclaimer: Risk Score calculation is dependent on accuracy of patient problem list and past encounter diagnosis.          HAS-BLED Score                            Risk Factors      Points   Hypertension  Uncontrolled, >415 mmHg systolic   No=0   Renal disease  Dialysis, transplant, Cr>2.26 mg/dL or >200 µmol/L   No=0   Liver disease   Cirrhosis or bilirubin >2x normal with AST/ALT/AP >3x normal   No=0   Stroke history   No=0   Prior major bleeding or predisposition to bleeding     No=0   Labile  INR  Unstable/high INRs, time in therapeutic range <60%   No=0   Age >71   Yes=1   Medication usage predisposing to bleeding   Aspirin, clopidogrel, NSAIDs   Yes=1   Alcohol use   >7 drinks/week   No=0     HAS-BLED Score:    2:  Risk was 4.1% in one validation study and 1.88 bleeds per 100 patient-years in another validation study. Brief assessment and plan:  New onset atrial fibrillation with RVR in setting of pyelonephritis with E. coli bacteremia. [id number] 5/ HAS-BLED 2 points, 4.1% risk. Echocardiogram pending. Patient intolerant to diltiazem with hypotension. Give digoxin 500 mcg IV followed by 2 doses of digoxin 250 mcg IV every 6 hours. Patient switched to therapeutic 1: 1 Lovenox, transition to Eliquis at discharge. Patient requesting no ischemic evaluation as she wants nothing invasive. We will review echocardiogram and base GDMT off of that. Agree with holding Imdur in light of hypotension. Give 2 g IV magnesium once. Case and subsequent orders discussed with Dr Chris Wilhelm. Further recommendations and full assessment and plan to follow as per Dr Mary Hatch note. Electronically signed by ION Francis CNP on 3/9/2023 at 9:07 AM      Patient seen and examined and Case discussed in detail with cardiology nurse krystal and agree with assessment and plan & history physical limitation discussed in detail and documented above. I contributed more than 51% of patient care.

## 2023-03-09 NOTE — CONSULTS
[facility]  [facility]    Consultation Note     Admit Date: 3/8/2023  3:30 AM    Reason for Consult:   pyelonephritis    Attending Physician:  Gena Lopez DO     Chief Complaint: vomiting    HISTORY OF PRESENT ILLNESS:   The patient is a 88 y.o.  female not known to the Infectious Diseases service. The patient presented with vomiting for few days and had fever. No urinary complaints. Or back pain.   She has hx of renal stones.     Since admission, pt had fever of 103, BP is low tachycardic, on 1L NC. Labs showed normal white count. Hgb is 10.7, platelet count is 153. Cr is 1.3/BUN 23,. Lactic acid was 2.7. BNP is 8500,. LFTs ok except midly elevated bilirubin. Blood cx 2 out of 2 turned positive for GNR, BCID showed E.coli, UA shows pyuria. Urine cx in process. CT A/p showed   1. Periportal low attenuation is seen within the liver with mild   heterogeneous enhancement.  Correlate with liver enzymes as hepatitis could   have this appearance.   2. Nonobstructive right renal calculi.  No significant hydroureteronephrosis   is identified.  Mild stranding seen adjacent to the ureters.  Correlate for   possible ascending urinary tract infection.  Bladder not well assessed given   pelvic artifact.   3. Small hiatal hernia.   4. Atherosclerosis including coronary artery involvement.   5. There is a 4 mm pulmonary nodule in the right lower lobe.  No follow-up   imaging recommended.  Reference below.   6. Other nonacute incidental findings as above     Patient is on IV ceftriaxone 1gr daily and I got consulted for further recommendations.    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    3 mild episodes    Thyroid disease     Use of cane as ambulatory aid     Vitamin D deficiency      Past Surgical History:        Procedure Laterality Date    ARTHROGRAPHY Left 5/20/2021    LEFT HIP INJECTION ARTHROGRAM performed by Jorge Saini MD at [address] [facility] Bilateral     HYSTERECTOMY (CERVIX STATUS UNKNOWN)      JOINT REPLACEMENT Right 2013    hip    KNEE ARTHROPLASTY Left 07/19/2016    OTHER SURGICAL HISTORY Right 5/23/2013    hip arthrogram    PARATHYROID GLAND SURGERY  1990's    TOTAL HIP ARTHROPLASTY Left 8/19/2021    LEFT HIP TOTAL ARTHROPLASTY performed by Jorge Saini MD at [address] [facility] Right 2/19/15    RIGHT TOTAL KNEE ARTHROPLASTY      Current Medications:   Scheduled Meds:   magnesium sulfate  2,000 mg IntraVENous Once    enoxaparin  1 mg/kg SubCUTAneous BID    sodium chloride flush  10 mL IntraVENous 2 times per day    insulin lispro  0-4 Units SubCUTAneous TID WC    insulin lispro  0-4 Units SubCUTAneous Nightly    buPROPion  150 mg Oral BID    cholestyramine light  4 g Oral Daily    [Held by provider] isosorbide mononitrate  30 mg Oral Daily    levothyroxine  125 mcg Oral Daily    pantoprazole  40 mg Oral QAM AC    aspirin  81 mg Oral Daily    cefTRIAXone (ROCEPHIN) IV  1,000 mg IntraVENous Q24H    insulin glargine  5 Units SubCUTAneous Nightly     Continuous Infusions:   dilTIAZem Stopped (03/09/23 0945)    sodium chloride      dextrose      sodium chloride 75 mL/hr at 03/09/23 0756     PRN Meds:perflutren lipid microspheres, sodium chloride flush, sodium chloride, potassium chloride **OR** potassium alternative oral replacement **OR** potassium chloride, ondansetron **OR** ondansetron, senna, acetaminophen **OR** acetaminophen, glucose, dextrose bolus **OR** dextrose bolus, glucagon (rDNA), dextrose    Allergies:  Patient has no known allergies. Social History:   Social History     Socioeconomic History    Marital status:    Tobacco Use    Smoking status: Former     Types: Cigarettes     Quit date: 1974     Years since quittin.8    Smokeless tobacco: Never   Vaping Use    Vaping Use: Never used   Substance and Sexual Activity    Alcohol use: Not Currently     Comment: very rare    Drug use: No     Family History:   No family history on file. . Otherwise non-pertinent to the chief complaint. REVIEW OF SYSTEMS:    As mentioned in HPI, all other systems negative. PHYSICAL EXAM:    Vitals:    BP (!) 81/49 Comment: cardizem drip stopped  Pulse (!) 113   Temp 98.1 °F (36.7 °C) (Axillary)   Resp 18   Ht 5' 1\" (1.549 m)   Wt 196 lb 3.2 oz (89 kg)   SpO2 97%   BMI 37.07 kg/m²   Constitutional: The patient is awake, alert, and oriented. Skin: Warm and dry. No rashes were noted. No jaundice. HEENT: Eyes show round, and reactive pupils. Moist mucous membranes, no ulcerations, no thrush. Neck: Supple to movements. No lymphadenopathy. Chest: No use of accessory muscles to breathe. Symmetrical expansion. Auscultation reveals no wheezing, crackles, or rhonchi. Cardiovascular: S1 and S2 are rhythmic and regular. No murmurs appreciated. Abdomen: soft, non tender  Extremities: No clubbing, no cyanosis, no edema.   Musculoskeletal: no gross focal abnormalities  Neurological: alert, oriented x 3  Lines: peripheral      CBC+dif:  Recent Labs     23  0345 23  0343   WBC 8.9 9.3   HGB 12.9 10.7*   HCT 40.9 34.4   MCV 98.8 98.9    153   NEUTROABS 7.91* 7.04     No results found for: CRP  No results found for: CRPHS  No results found for: SEDRATE  Lab Results   Component Value Date    ALT 7 2023    AST 11 2023    ALKPHOS 61 2023    BILITOT 0.6 2023     Lab Results   Component Value Date/Time     2023 03:43 AM    K 4.0 2023 03:43 AM     2023 03:43 AM    CO2 21 03/09/2023 03:43 AM    BUN 23 03/09/2023 03:43 AM    CREATININE 1.3 03/09/2023 03:43 AM    GFRAA 47 08/20/2021 02:50 AM    LABGLOM 40 03/09/2023 03:43 AM    GLUCOSE 143 03/09/2023 03:43 AM    GLUCOSE 122 01/27/2012 11:45 AM    PROT 5.2 03/09/2023 03:43 AM    LABALBU 2.7 03/09/2023 03:43 AM    CALCIUM 8.1 03/09/2023 03:43 AM    BILITOT 0.6 03/09/2023 03:43 AM    ALKPHOS 61 03/09/2023 03:43 AM    AST 11 03/09/2023 03:43 AM    ALT 7 03/09/2023 03:43 AM       Lab Results   Component Value Date/Time    PROTIME 17.4 11/24/2013 07:40 AM    INR 2.1 11/24/2013 07:40 AM       Lab Results   Component Value Date/Time    TSH 0.780 03/09/2023 03:43 AM       Lab Results   Component Value Date/Time    COLORU Yellow 03/08/2023 03:45 AM    PHUR 6.0 03/08/2023 03:45 AM    LABCAST FEW 02/10/2015 11:20 AM    WBCUA >20 03/08/2023 03:45 AM    WBCUA >20 01/27/2012 11:00 AM    RBCUA 10-20 03/08/2023 03:45 AM    RBCUA NONE 11/15/2013 11:10 AM    BACTERIA MODERATE 03/08/2023 03:45 AM    CLARITYU CLOUDY 03/08/2023 03:45 AM    SPECGRAV 1.020 03/08/2023 03:45 AM    LEUKOCYTESUR MODERATE 03/08/2023 03:45 AM    UROBILINOGEN 0.2 03/08/2023 03:45 AM    BILIRUBINUR Negative 03/08/2023 03:45 AM    BILIRUBINUR NEGATIVE 01/27/2012 11:00 AM    BLOODU LARGE 03/08/2023 03:45 AM    GLUCOSEU Negative 03/08/2023 03:45 AM    GLUCOSEU NEGATIVE 01/27/2012 11:00 AM       No results found for: YAO4ZXS, BEART, Y9NRDEKY, PHART, THGBART, GJA9QKJ, PO2ART, XJI7ZMS  Radiology:  XR CHEST PORTABLE   Final Result   Aortic atherosclerosis. Central bronchial thickening which may be related to peribronchial edema or   inflammatory change. XR HIP BILATERAL W AP PELVIS (2 VIEWS)   Final Result   1. Intact bilateral total hip arthroplasties, with no periprosthetic fracture   or hardware complication. CT HEAD WO CONTRAST   Final Result   1. No acute intracranial abnormality is identified.    2.  Generalized age-related cortical atrophy, with intracranial   atherosclerosis and CT findings suggestive of chronic microvascular ischemic   change. .   3. Focal left cerebellar hemisphere encephalomalacia related to remote   infarct, also noted on the previous scan though less conspicuous given   calvarial artifact. 4. Degenerative changes in the cervical spine without acute osseous fracture. CT CERVICAL SPINE WO CONTRAST   Final Result   1. No acute intracranial abnormality is identified. 2.  Generalized age-related cortical atrophy, with intracranial   atherosclerosis and CT findings suggestive of chronic microvascular ischemic   change. .   3. Focal left cerebellar hemisphere encephalomalacia related to remote   infarct, also noted on the previous scan though less conspicuous given   calvarial artifact. 4. Degenerative changes in the cervical spine without acute osseous fracture. CT ABDOMEN PELVIS W IV CONTRAST Additional Contrast? None   Final Result   1. Periportal low attenuation is seen within the liver with mild   heterogeneous enhancement. Correlate with liver enzymes as hepatitis could   have this appearance. 2. Nonobstructive right renal calculi. No significant hydroureteronephrosis   is identified. Mild stranding seen adjacent to the ureters. Correlate for   possible ascending urinary tract infection. Bladder not well assessed given   pelvic artifact. 3. Small hiatal hernia. 4. Atherosclerosis including coronary artery involvement. 5. There is a 4 mm pulmonary nodule in the right lower lobe. No follow-up   imaging recommended. Reference below. 6. Other nonacute incidental findings as above. RECOMMENDATIONS:   4 mm right solid pulmonary nodule. No routine follow-up imaging is   recommended per Fleischner Society Guidelines. These guidelines do not apply to immunocompromised patients and patients with   cancer. Follow up in patients with significant comorbidities as clinically   warranted.  For lung cancer screening, adhere to Lung-RADS guidelines. Reference: Radiology. 2017; 284(1):228-43. Microbiology:  Pending  Recent Labs     03/08/23  0359   BC Gram stain performed from blood culture bottle media  Gram negative rods  *     No results for input(s): ORG in the last 72 hours. Recent Labs     03/08/23  0359   BLOODCULT2 Gram stain performed from blood culture bottle media  Gram negative rods  Previously positive blood culture called  *     No results for input(s): STREPNEUMAGU in the last 72 hours. No results for input(s): LP1UAG in the last 72 hours. No results for input(s): ASO in the last 72 hours. No results for input(s): CULTRESP in the last 72 hours. Assessment:  E.coli septicemia from UTI:   Pyelonephritis:   CECY:    Plan:    Cont IV ceftriaxone 1gr daily   Monitor labs  Will follow cx and adjust antibiotics  Will follow with you    Thank you for having us see this patient in consultation. I will be discussing this case with the treating physicians.     Electronically signed by Magalys Barfield MD on 3/9/2023 at 10:01 AM

## 2023-03-10 LAB
ALBUMIN SERPL-MCNC: 2.6 G/DL (ref 3.5–5.2)
ALP BLD-CCNC: 87 U/L (ref 35–104)
ALT SERPL-CCNC: 9 U/L (ref 0–32)
ANION GAP SERPL CALCULATED.3IONS-SCNC: 10 MMOL/L (ref 7–16)
AST SERPL-CCNC: 14 U/L (ref 0–31)
BASOPHILS ABSOLUTE: 0.03 E9/L (ref 0–0.2)
BASOPHILS RELATIVE PERCENT: 0.4 % (ref 0–2)
BILIRUB SERPL-MCNC: 0.7 MG/DL (ref 0–1.2)
BUN BLDV-MCNC: 16 MG/DL (ref 6–23)
CALCIUM SERPL-MCNC: 8.6 MG/DL (ref 8.6–10.2)
CHLORIDE BLD-SCNC: 109 MMOL/L (ref 98–107)
CO2: 20 MMOL/L (ref 22–29)
CREAT SERPL-MCNC: 1.1 MG/DL (ref 0.5–1)
EOSINOPHILS ABSOLUTE: 0.2 E9/L (ref 0.05–0.5)
EOSINOPHILS RELATIVE PERCENT: 2.4 % (ref 0–6)
GFR SERPL CREATININE-BSD FRML MDRD: 49 ML/MIN/1.73
GLUCOSE BLD-MCNC: 130 MG/DL (ref 74–99)
HCT VFR BLD CALC: 35.3 % (ref 34–48)
HEMOGLOBIN: 11.1 G/DL (ref 11.5–15.5)
IMMATURE GRANULOCYTES #: 0.07 E9/L
IMMATURE GRANULOCYTES %: 0.8 % (ref 0–5)
LV EF: 63 %
LVEF MODALITY: NORMAL
LYMPHOCYTES ABSOLUTE: 1.1 E9/L (ref 1.5–4)
LYMPHOCYTES RELATIVE PERCENT: 13.2 % (ref 20–42)
MCH RBC QN AUTO: 31.1 PG (ref 26–35)
MCHC RBC AUTO-ENTMCNC: 31.4 % (ref 32–34.5)
MCV RBC AUTO: 98.9 FL (ref 80–99.9)
METER GLUCOSE: 118 MG/DL (ref 74–99)
METER GLUCOSE: 120 MG/DL (ref 74–99)
METER GLUCOSE: 140 MG/DL (ref 74–99)
METER GLUCOSE: 186 MG/DL (ref 74–99)
MONOCYTES ABSOLUTE: 1.08 E9/L (ref 0.1–0.95)
MONOCYTES RELATIVE PERCENT: 12.9 % (ref 2–12)
NEUTROPHILS ABSOLUTE: 5.88 E9/L (ref 1.8–7.3)
NEUTROPHILS RELATIVE PERCENT: 70.3 % (ref 43–80)
ORGANISM: ABNORMAL
PDW BLD-RTO: 14.6 FL (ref 11.5–15)
PLATELET # BLD: 186 E9/L (ref 130–450)
PMV BLD AUTO: 11.2 FL (ref 7–12)
POTASSIUM REFLEX MAGNESIUM: 4.1 MMOL/L (ref 3.5–5)
RBC # BLD: 3.57 E12/L (ref 3.5–5.5)
SODIUM BLD-SCNC: 139 MMOL/L (ref 132–146)
TOTAL PROTEIN: 5.4 G/DL (ref 6.4–8.3)
URINE CULTURE, ROUTINE: ABNORMAL
WBC # BLD: 8.4 E9/L (ref 4.5–11.5)

## 2023-03-10 PROCEDURE — 6370000000 HC RX 637 (ALT 250 FOR IP): Performed by: INTERNAL MEDICINE

## 2023-03-10 PROCEDURE — 6360000002 HC RX W HCPCS

## 2023-03-10 PROCEDURE — 80053 COMPREHEN METABOLIC PANEL: CPT

## 2023-03-10 PROCEDURE — 36415 COLL VENOUS BLD VENIPUNCTURE: CPT

## 2023-03-10 PROCEDURE — 82962 GLUCOSE BLOOD TEST: CPT

## 2023-03-10 PROCEDURE — 2580000003 HC RX 258: Performed by: INTERNAL MEDICINE

## 2023-03-10 PROCEDURE — 2060000000 HC ICU INTERMEDIATE R&B

## 2023-03-10 PROCEDURE — 99233 SBSQ HOSP IP/OBS HIGH 50: CPT | Performed by: INTERNAL MEDICINE

## 2023-03-10 PROCEDURE — 85025 COMPLETE CBC W/AUTO DIFF WBC: CPT

## 2023-03-10 PROCEDURE — 93306 TTE W/DOPPLER COMPLETE: CPT

## 2023-03-10 PROCEDURE — 6360000002 HC RX W HCPCS: Performed by: INTERNAL MEDICINE

## 2023-03-10 RX ORDER — METOPROLOL SUCCINATE 25 MG/1
12.5 TABLET, EXTENDED RELEASE ORAL DAILY
Status: DISCONTINUED | OUTPATIENT
Start: 2023-03-10 | End: 2023-03-14 | Stop reason: HOSPADM

## 2023-03-10 RX ADMIN — CHOLESTYRAMINE 4 G: 4 POWDER, FOR SUSPENSION ORAL at 10:53

## 2023-03-10 RX ADMIN — BUPROPION HYDROCHLORIDE 150 MG: 150 TABLET, EXTENDED RELEASE ORAL at 10:53

## 2023-03-10 RX ADMIN — LEVOTHYROXINE SODIUM 125 MCG: 0.12 TABLET ORAL at 05:00

## 2023-03-10 RX ADMIN — SODIUM CHLORIDE, PRESERVATIVE FREE 10 ML: 5 INJECTION INTRAVENOUS at 20:55

## 2023-03-10 RX ADMIN — ENOXAPARIN SODIUM 90 MG: 100 INJECTION SUBCUTANEOUS at 10:55

## 2023-03-10 RX ADMIN — ENOXAPARIN SODIUM 90 MG: 100 INJECTION SUBCUTANEOUS at 20:55

## 2023-03-10 RX ADMIN — PANTOPRAZOLE SODIUM 40 MG: 40 TABLET, DELAYED RELEASE ORAL at 05:00

## 2023-03-10 RX ADMIN — ASPIRIN 81 MG CHEWABLE TABLET 81 MG: 81 TABLET CHEWABLE at 10:53

## 2023-03-10 RX ADMIN — BUPROPION HYDROCHLORIDE 150 MG: 150 TABLET, EXTENDED RELEASE ORAL at 20:55

## 2023-03-10 RX ADMIN — CEFTRIAXONE 1000 MG: 1 INJECTION, POWDER, FOR SOLUTION INTRAMUSCULAR; INTRAVENOUS at 04:47

## 2023-03-10 RX ADMIN — SENNOSIDES 8.6 MG: 8.6 TABLET, FILM COATED ORAL at 04:52

## 2023-03-10 RX ADMIN — DIGOXIN 250 MCG: 0.25 INJECTION INTRAMUSCULAR; INTRAVENOUS at 00:19

## 2023-03-10 RX ADMIN — METOPROLOL SUCCINATE 12.5 MG: 25 TABLET, EXTENDED RELEASE ORAL at 12:32

## 2023-03-10 RX ADMIN — SODIUM CHLORIDE, PRESERVATIVE FREE 10 ML: 5 INJECTION INTRAVENOUS at 10:55

## 2023-03-10 RX ADMIN — INSULIN GLARGINE 5 UNITS: 100 INJECTION, SOLUTION SUBCUTANEOUS at 20:55

## 2023-03-10 NOTE — PROGRESS NOTES
Admit Date: 3/8/2023    Subjective:     Patient up in chair. Without complaint    Scheduled Meds:   enoxaparin  1 mg/kg SubCUTAneous BID    sodium chloride flush  10 mL IntraVENous 2 times per day    insulin lispro  0-4 Units SubCUTAneous TID WC    insulin lispro  0-4 Units SubCUTAneous Nightly    buPROPion  150 mg Oral BID    cholestyramine light  4 g Oral Daily    [Held by provider] isosorbide mononitrate  30 mg Oral Daily    levothyroxine  125 mcg Oral Daily    pantoprazole  40 mg Oral QAM AC    aspirin  81 mg Oral Daily    cefTRIAXone (ROCEPHIN) IV  1,000 mg IntraVENous Q24H    insulin glargine  5 Units SubCUTAneous Nightly     Continuous Infusions:   sodium chloride      dextrose      sodium chloride 75 mL/hr at 03/09/23 2017     PRN Meds:perflutren lipid microspheres, sodium chloride flush, sodium chloride, potassium chloride **OR** potassium alternative oral replacement **OR** potassium chloride, ondansetron **OR** ondansetron, senna, acetaminophen **OR** acetaminophen, glucose, dextrose bolus **OR** dextrose bolus, glucagon (rDNA), dextrose      Objective:     I/O last 3 completed shifts: In: 380 [P.O.:380]  Out: 1425 [Urine:1425]  No intake/output data recorded. BP (!) 119/58   Pulse 86   Temp 97.9 °F (36.6 °C) (Temporal)   Resp 18   Ht 5' 1\" (1.549 m)   Wt 196 lb 3.2 oz (89 kg)   SpO2 99%   BMI 37.07 kg/m²     General appearance: alert, appears stated age, and cooperative  Neck: no adenopathy, no carotid bruit, no JVD, supple, symmetrical, trachea midline, and thyroid not enlarged, symmetric, no tenderness/mass/nodules  Lungs: clear to auscultation bilaterally  Chest wall: no tenderness  Heart: irregularly, irregular, rate 70's. -mrg.   Abdomen: soft, non-tender; bowel sounds normal; no masses,  no organomegaly  Extremities: extremities normal, atraumatic, no cyanosis or edema    Data Review    CBC with Differential:    Lab Results   Component Value Date/Time    WBC 8.4 03/10/2023 06:15 AM    RBC 3.57 03/10/2023 06:15 AM    HGB 11.1 03/10/2023 06:15 AM    HCT 35.3 03/10/2023 06:15 AM     03/10/2023 06:15 AM    MCV 98.9 03/10/2023 06:15 AM    MCH 31.1 03/10/2023 06:15 AM    MCHC 31.4 03/10/2023 06:15 AM    RDW 14.6 03/10/2023 06:15 AM    LYMPHOPCT 13.2 03/10/2023 06:15 AM    MONOPCT 12.9 03/10/2023 06:15 AM    BASOPCT 0.4 03/10/2023 06:15 AM    MONOSABS 1.08 03/10/2023 06:15 AM    LYMPHSABS 1.10 03/10/2023 06:15 AM    EOSABS 0.20 03/10/2023 06:15 AM    BASOSABS 0.03 03/10/2023 06:15 AM     CMP:    Lab Results   Component Value Date/Time     03/10/2023 05:29 AM    K 4.1 03/10/2023 05:29 AM     03/10/2023 05:29 AM    CO2 20 03/10/2023 05:29 AM    BUN 16 03/10/2023 05:29 AM    CREATININE 1.1 03/10/2023 05:29 AM    GFRAA 47 08/20/2021 02:50 AM    LABGLOM 49 03/10/2023 05:29 AM    GLUCOSE 130 03/10/2023 05:29 AM    GLUCOSE 122 01/27/2012 11:45 AM    PROT 5.4 03/10/2023 05:29 AM    LABALBU 2.6 03/10/2023 05:29 AM    CALCIUM 8.6 03/10/2023 05:29 AM    BILITOT 0.7 03/10/2023 05:29 AM    ALKPHOS 87 03/10/2023 05:29 AM    AST 14 03/10/2023 05:29 AM    ALT 9 03/10/2023 05:29 AM       XR CHEST PORTABLE   Final Result   Aortic atherosclerosis. Central bronchial thickening which may be related to peribronchial edema or   inflammatory change. XR HIP BILATERAL W AP PELVIS (2 VIEWS)   Final Result   1. Intact bilateral total hip arthroplasties, with no periprosthetic fracture   or hardware complication. CT HEAD WO CONTRAST   Final Result   1. No acute intracranial abnormality is identified. 2.  Generalized age-related cortical atrophy, with intracranial   atherosclerosis and CT findings suggestive of chronic microvascular ischemic   change. .   3. Focal left cerebellar hemisphere encephalomalacia related to remote   infarct, also noted on the previous scan though less conspicuous given   calvarial artifact. 4. Degenerative changes in the cervical spine without acute osseous fracture. CT CERVICAL SPINE WO CONTRAST   Final Result   1. No acute intracranial abnormality is identified. 2.  Generalized age-related cortical atrophy, with intracranial   atherosclerosis and CT findings suggestive of chronic microvascular ischemic   change. .   3. Focal left cerebellar hemisphere encephalomalacia related to remote   infarct, also noted on the previous scan though less conspicuous given   calvarial artifact. 4. Degenerative changes in the cervical spine without acute osseous fracture. CT ABDOMEN PELVIS W IV CONTRAST Additional Contrast? None   Final Result   1. Periportal low attenuation is seen within the liver with mild   heterogeneous enhancement. Correlate with liver enzymes as hepatitis could   have this appearance. 2. Nonobstructive right renal calculi. No significant hydroureteronephrosis   is identified. Mild stranding seen adjacent to the ureters. Correlate for   possible ascending urinary tract infection. Bladder not well assessed given   pelvic artifact. 3. Small hiatal hernia. 4. Atherosclerosis including coronary artery involvement. 5. There is a 4 mm pulmonary nodule in the right lower lobe. No follow-up   imaging recommended. Reference below. 6. Other nonacute incidental findings as above. RECOMMENDATIONS:   4 mm right solid pulmonary nodule. No routine follow-up imaging is   recommended per Fleischner Society Guidelines. These guidelines do not apply to immunocompromised patients and patients with   cancer. Follow up in patients with significant comorbidities as clinically   warranted. For lung cancer screening, adhere to Lung-RADS guidelines. Reference: Radiology. 2017; 284(1):228-43.               Assessment:  Sepsis secondary to complicated UTI -E.coli bacteremia  Afib with rvr -rate controlled today  Dehydration-approaching euvolemia  Lactic acidosis-resolved  Fall   Gait dysfunction  Type II diabetes mellitus Hba1c 7.0 on 2/21/23. Plan:  Remains on IVF. I would suggest by tomorrow am, could d/c IVF. Metoprolol and lisinopril remain on hold due to recent hypotension. Could resume metoprolol first as bp allows. Remains on IV Ceftriaxone. Awaiting ID recs re: PICC line/length of tx. Awaiting acceptance at [facility] for rehab.      Electronically signed by Ciara Montes MD on 3/10/2023 at 11:51 AM

## 2023-03-10 NOTE — CARE COORDINATION
Received insurance auth for [employer]- auth good through [employer] 3/12. Will need COVID test on day of discharge- can go to facility if test is positive. Will need to call Nurse to nurse report over weekend to [phone] and ask for nurses station. NOAH in epic, HENS and ambulette transport form on chart.   Amy Thacker, RN case manager

## 2023-03-10 NOTE — PROGRESS NOTES
INPATIENT CARDIOLOGY Follow UP    Name: Luis Fontaine    Age: 82 y.o. Date of Admission: 3/8/2023  3:30 AM    Date of Service: 3/10/2023      Referring Physician: Shyanne Lopez DO      Subjective:  No significant event overnight. Past Medical History:  Past Medical History:   Diagnosis Date    Arthritis     osteo    CAD (coronary artery disease)     follows with PCP    Depression     Diabetes mellitus ([facility] [address]     insulin dependent    H/O cardiovascular stress test 2016    report on chart    Heartburn     History of kidney stones     Hx of blood clots     h/o    Hyperlipidemia     Hypertension     Left hip pain 2021    Myocardial infarct ([facility] [address]     3 mild episodes    Thyroid disease     Use of cane as ambulatory aid     Vitamin D deficiency        Past Surgical History:  Past Surgical History:   Procedure Laterality Date    ARTHROGRAPHY Left 2021    LEFT HIP INJECTION ARTHROGRAM performed by Chelsey Forbes MD at [address] [place] [address] Bilateral     HYSTERECTOMY (CERVIX STATUS UNKNOWN)      JOINT REPLACEMENT Right     hip    KNEE ARTHROPLASTY Left 2016    OTHER SURGICAL HISTORY Right 2013    hip arthrogram    PARATHYROID GLAND SURGERY  's    TOTAL HIP ARTHROPLASTY Left 2021    LEFT HIP TOTAL ARTHROPLASTY performed by Chelsey Forbes MD at [address] [place] [address] Right 2/19/15    RIGHT TOTAL KNEE ARTHROPLASTY        Family History:  No family history on file. Social History:  Social History     Socioeconomic History    Marital status:       Spouse name: Not on file    Number of children: Not on file    Years of education: Not on file    Highest education level: Not on file   Occupational History    Not on file   Tobacco Use    Smoking status: Former     Types: Cigarettes     Quit date: 1974     Years since quittin.8    Smokeless tobacco: Never   Vaping Use Vaping Use: Never used   Substance and Sexual Activity    Alcohol use: Not Currently     Comment: very rare    Drug use: No    Sexual activity: Not on file   Other Topics Concern    Not on file   Social History Narrative    Not on file     Social Determinants of Health     Financial Resource Strain: Not on file   Food Insecurity: Not on file   Transportation Needs: Not on file   Physical Activity: Not on file   Stress: Not on file   Social Connections: Not on file   Intimate Partner Violence: Not on file   Housing Stability: Not on file       Allergies:  No Known Allergies    Home Medications:  Prior to Admission medications    Medication Sig Start Date End Date Taking? Authorizing Provider   atorvastatin (LIPITOR) 20 MG tablet Take 20 mg by mouth daily   Yes Historical Provider, MD   levothyroxine (SYNTHROID) 125 MCG tablet Take 125 mcg by mouth Daily    Historical Provider, MD   aspirin 325 MG tablet Take 325 mg by mouth daily Last dose 8-12-21    Historical Provider, MD   omeprazole (PRILOSEC) 20 MG delayed release capsule Take 20 mg by mouth daily Instructed to take morning of surgery with a sip of water    Historical Provider, MD   [address] [place] [address]  2/16/21   Historical Provider, MD   lidocaine (LIDODERM) 5 % Place 1 patch onto the skin daily 12 hours on, 12 hours off. 8/1/18   Lachelle Gloss, APRN - CNP   cholestyramine light 4 G packet Take 4 g by mouth daily     Historical Provider, MD   isosorbide mononitrate (IMDUR) 30 MG CR tablet Take 30 mg by mouth daily Instructed to take morning of surgery with a sip of water    Historical Provider, MD   buPROPion SR (WELLBUTRIN SR) 150 MG SR tablet Take 150 mg by mouth 2 times daily Instructed to take morning of surgery with a sip of water    Historical Provider, MD   lisinopril (PRINIVIL;ZESTRIL) 10 MG tablet Take 10 mg by mouth daily.     Historical Provider, MD   Metoprolol Tartrate (LOPRESSOR PO) Take 50 mg by mouth 2 times daily Instructed to take morning of surgery with a sip of water    Historical Provider, MD   Potassium Citrate (UROCIT-K 10 PO) Take 2 tablets by mouth 2 times daily     Historical Provider, MD   allopurinol (ZYLOPRIM) 300 MG tablet Take 300 mg by mouth daily.  Taking for kidney stones    Historical Provider, MD   Cholecalciferol (VITAMIN D3) 2000 UNITS CAPS Take 2,000 Units by mouth daily Last dose 08/12/21    Historical Provider, MD   multivitamin-iron-minerals-folic acid (CENTRUM) chewable tablet Take 1 tablet by mouth daily Last dose 8-12-21    Historical Provider, MD       Current Medications:  Current Facility-Administered Medications   Medication Dose Route Frequency Provider Last Rate Last Admin    perflutren lipid microspheres (DEFINITY) injection 1.5 mL  1.5 mL IntraVENous ONCE PRN Gena E Jessica, DO        enoxaparin (LOVENOX) injection 90 mg  1 mg/kg SubCUTAneous BID Shama Ache, APRN - CNP   90 mg at 03/10/23 1055    sodium chloride flush 0.9 % injection 10 mL  10 mL IntraVENous 2 times per day Gena E Jessica, DO   10 mL at 03/10/23 1055    sodium chloride flush 0.9 % injection 10 mL  10 mL IntraVENous PRN Gena E Jessica, DO        0.9 % sodium chloride infusion   IntraVENous PRN Gena E Jessica, DO        potassium chloride (KLOR-CON M) extended release tablet 40 mEq  40 mEq Oral PRN Gena E Jessica, DO        Or    potassium bicarb-citric acid (EFFER-K) effervescent tablet 40 mEq  40 mEq Oral PRN Gena E Jessica, DO        Or    potassium chloride 10 mEq/100 mL IVPB (Peripheral Line)  10 mEq IntraVENous PRN Gena E Jessica, DO        ondansetron (ZOFRAN-ODT) disintegrating tablet 4 mg  4 mg Oral Q8H PRN Gena E Jessica, DO        Or    ondansetron (ZOFRAN) injection 4 mg  4 mg IntraVENous Q6H PRN Gena E Jessica, DO        senna (SENOKOT) tablet 8.6 mg  1 tablet Oral Daily PRN Gena E Jessica, DO   8.6 mg at 03/10/23 0452    acetaminophen (TYLENOL) tablet 650 mg  650 mg Oral Q6H PRN Gena E Jessica, DO Or    acetaminophen (TYLENOL) suppository 650 mg  650 mg Rectal Q6H PRN Gena E Jessica, DO        insulin lispro (HUMALOG) injection vial 0-4 Units  0-4 Units SubCUTAneous TID WC Gena LEWIS Jessica, DO   1 Units at 03/08/23 1221    insulin lispro (HUMALOG) injection vial 0-4 Units  0-4 Units SubCUTAneous Nightly Gena E Jessica, DO        glucose chewable tablet 16 g  4 tablet Oral PRN Gena E Jessica, DO        dextrose bolus 10% 125 mL  125 mL IntraVENous PRN Gena E Jessica, DO        Or    dextrose bolus 10% 250 mL  250 mL IntraVENous PRN Gena E Jessica, DO        glucagon injection 1 mg  1 mg SubCUTAneous PRN Gena E Jessica, DO        dextrose 10 % infusion   IntraVENous Continuous PRN Gena E Jessica, DO        buPROPion [facility]) extended release tablet 150 mg  150 mg Oral BID Gena LEWIS Jessica, DO   150 mg at 03/10/23 1053    cholestyramine light packet 4 g  4 g Oral Daily Gena LEWIS Jessica, DO   4 g at 03/10/23 1053    [Held by provider] isosorbide mononitrate (IMDUR) extended release tablet 30 mg  30 mg Oral Daily Gena LEWIS Jessica, DO   30 mg at 03/08/23 0956    levothyroxine (SYNTHROID) tablet 125 mcg  125 mcg Oral Daily Gena E Jessica, DO   125 mcg at 03/10/23 0500    pantoprazole (PROTONIX) tablet 40 mg  40 mg Oral QAM AC Gena LEWIS Jessica, DO   40 mg at 03/10/23 0500    0.9 % sodium chloride infusion   IntraVENous Continuous Gena E Jessica, DO 75 mL/hr at 03/09/23 2017 Rate Verify at 03/09/23 2017    aspirin chewable tablet 81 mg  81 mg Oral Daily Gena LEWIS Jessica, DO   81 mg at 03/10/23 1053    cefTRIAXone (ROCEPHIN) 1,000 mg in sterile water 10 mL IV syringe  1,000 mg IntraVENous Q24H Gena E Jessica, DO   1,000 mg at 03/10/23 0447    insulin glargine (LANTUS) injection vial 5 Units  5 Units SubCUTAneous Nightly Gena E Jessica, DO   5 Units at 03/09/23 2027      sodium chloride      dextrose      sodium chloride 75 mL/hr at 03/09/23 2017       Physical Exam:  BP (!) 119/58   Pulse 86   Temp 97.9 °F (36.6 °C) (Temporal)   Resp 18   Ht 5' 1\" (1.549 m)   Wt 196 lb 3.2 oz (89 kg)   SpO2 99%   BMI 37.07 kg/m²   Wt Readings from Last 3 Encounters:   03/08/23 196 lb 3.2 oz (89 kg)   08/19/21 200 lb (90.7 kg)   08/13/21 210 lb (95.3 kg)       Appearance: Alert and oriented x3 not in acute distress. Skin: Dry skin  Head: Atraumatic  Eyes: Intact extraocular muscles   ENMT: Mucous membranes are moist  Neck: Supple  Lungs: Clear to auscultation  Cardiac: Normal S1 and S2  Abdomen: Protuberant  Extremities: Intact range of motion  Neurologic: No focal neurological deficits  Peripheral Pulses: 2+ peripheral pulses    Intake/Output:    Intake/Output Summary (Last 24 hours) at 3/10/2023 1211  Last data filed at 3/10/2023 0622  Gross per 24 hour   Intake 180 ml   Output 925 ml   Net -745 ml     No intake/output data recorded.     Laboratory Tests:  Recent Labs     03/08/23  0345 03/09/23  0343 03/10/23  0529    136 139   K 4.6 4.0 4.1    108* 109*   CO2 20* 21* 20*   BUN 20 23 16   CREATININE 1.2* 1.3* 1.1*   GLUCOSE 252* 143* 130*   CALCIUM 9.3 8.1* 8.6     Lab Results   Component Value Date/Time    MG 1.7 03/09/2023 03:43 AM     Recent Labs     03/08/23  0345 03/09/23  0343 03/10/23  0529   ALKPHOS 76 61 87   ALT 7 7 9   AST 12 11 14   PROT 6.3* 5.2* 5.4*   BILITOT 1.9* 0.6 0.7   BILIDIR 0.9*  --   --    LABALBU 3.5 2.7* 2.6*     Recent Labs     03/08/23  0345 03/09/23  0343 03/10/23  0615   WBC 8.9 9.3 8.4   RBC 4.14 3.48* 3.57   HGB 12.9 10.7* 11.1*   HCT 40.9 34.4 35.3   MCV 98.8 98.9 98.9   MCH 31.2 30.7 31.1   MCHC 31.5* 31.1* 31.4*   RDW 14.4 14.6 14.6    153 186   MPV 10.5 11.0 11.2     Lab Results   Component Value Date    CKTOTAL 88 03/08/2023     Recent Labs     03/08/23  0345 03/08/23  0542 03/09/23  0343   CKTOTAL 88  --   --    TROPHS 27* 32* 34*     Lab Results   Component Value Date    INR 2.1 11/24/2013    INR 2.0 11/23/2013    INR 1.2 11/22/2013    PROTIME 17.4 (H) 11/24/2013    PROTIME 16.8 (H) 11/23/2013    PROTIME 13.2 (H) 11/22/2013     Lab Results   Component Value Date    TSH 0.780 03/09/2023    TSH 0.973 11/15/2013     No results found for: LABA1C  No results found for: EAG  No results found for: CHOL  No results found for: TRIG  No results found for: HDL  No results found for: LDLCALC, LDLCHOLESTEROL  No results found for: LABVLDL, VLDL  No results found for: CHOLHDLRATIO  Recent Labs     03/08/23  0345   PROBNP 8,522*       Cardiac Tests:          Echocardiogram reviewed:     Stress test reviewed:      Cardiac catheterization reviewed:     CXR reviewed: The ASCVD Risk score (Dash WETZEL, et al., 2019) failed to calculate for the following reasons: The 2019 ASCVD risk score is only valid for ages 36 to 78    ASSESSMENT:         CAD:  David Kauffman 7/2016: Normal imaging. No ischemia. [facility] 1997 and 1998: Further details unknown. Patient reports she does not know why she had heart cath done but she had no stents.   Hypertension  Hyperlipidemia  IDDM  Hypothyroidism  GERD  History of blood clots>> patient denies history of blood clots  History of nephrolithiasis  Vitamin D deficiency  Arthritis  Use of cane  Depression  Sepsis due to complicated UTI with E. coli bacteremia  Pyelonephritis  Nausea vomiting  Dehydration  Acute kidney injury  Acute respiratory failure  New onset A-fib with RVR    PLAN:  New onset A-fib with RVR in the setting of sepsis from complicated UTI and pyelonephritis with subsequent hypotension  She was placed on digoxin load for rate control yesterday  Blood pressure appears a bit stable and will initiate low-dose beta-blocker with holding parameters  Hold beta-blocker if systolic blood pressure less than 100  If blood pressure does not allow initiation of beta-blocker then please initiate digoxin 125 mcg p.o. daily for rate control and monitor BUN/creatinine, electrolytes and EKG closely  Awaiting echocardiogram to guide therapy  At this time patient has expressed that she has no interest in ischemic evaluation. Thank you for allowing me to participate in your patient's care. Please feel free to contact me if you have any questions or concerns.     Zamzam Ruelas MD  [facility]) Cardiology

## 2023-03-10 NOTE — PROGRESS NOTES
[address] [place] [address] [facility]  [facility]  Progress Note    SUBJECTIVE:  Chief Complaint   Patient presents with    Abdominal Pain    Nausea     N/v starting tonight, daughter in law reports she was fine going to bed     Patient is tolerating medications. No reported adverse drug reactions. No nausea, vomiting, diarrhea. No abdominal or flank pain or dysuria. Tired. Review of systems:  As stated above in the chief complaint, otherwise negative. Medications:  Scheduled Meds:   enoxaparin  1 mg/kg SubCUTAneous BID    sodium chloride flush  10 mL IntraVENous 2 times per day    insulin lispro  0-4 Units SubCUTAneous TID WC    insulin lispro  0-4 Units SubCUTAneous Nightly    buPROPion  150 mg Oral BID    cholestyramine light  4 g Oral Daily    [Held by provider] isosorbide mononitrate  30 mg Oral Daily    levothyroxine  125 mcg Oral Daily    pantoprazole  40 mg Oral QAM AC    aspirin  81 mg Oral Daily    cefTRIAXone (ROCEPHIN) IV  1,000 mg IntraVENous Q24H    insulin glargine  5 Units SubCUTAneous Nightly     Continuous Infusions:   sodium chloride      dextrose      sodium chloride 75 mL/hr at 23 2017     PRN Meds:perflutren lipid microspheres, sodium chloride flush, sodium chloride, potassium chloride **OR** potassium alternative oral replacement **OR** potassium chloride, ondansetron **OR** ondansetron, senna, acetaminophen **OR** acetaminophen, glucose, dextrose bolus **OR** dextrose bolus, glucagon (rDNA), dextrose    OBJECTIVE:  /67   Pulse 68   Temp 98.9 °F (37.2 °C) (Oral)   Resp 18   Ht 5' 1\" (1.549 m)   Wt 196 lb 3.2 oz (89 kg)   SpO2 95%   BMI 37.07 kg/m²   Temp  Av.1 °F (37.3 °C)  Min: 98.5 °F (36.9 °C)  Max: 100.1 °F (37.8 °C)  Constitutional: The patient is awake, alert, and oriented. Skin: Warm and dry. No rashes were noted. HEENT: Round and reactive pupils. Moist mucous membranes. No ulcerations or thrush. Neck: Supple to movements.    Chest: No use of accessory muscles to breathe. Symmetrical expansion. No wheezing, crackles or rhonchi. Cardiovascular: S1 and S2 are rhythmic and regular. No murmurs appreciated. Abdomen: Positive bowel sounds to auscultation. Benign to palpation. No masses felt. No hepatosplenomegaly. Extremities: No clubbing, no cyanosis, no edema.   Lines: Peripheral.    Laboratory and Tests Review:  Lab Results   Component Value Date    WBC 8.4 03/10/2023    WBC 9.3 03/09/2023    WBC 8.9 03/08/2023    HGB 11.1 (L) 03/10/2023    HCT 35.3 03/10/2023    MCV 98.9 03/10/2023     03/10/2023     Lab Results   Component Value Date    NEUTROABS 5.88 03/10/2023    NEUTROABS 7.04 03/09/2023    NEUTROABS 7.91 (H) 03/08/2023     No results found for: [facility]  Lab Results   Component Value Date    ALT 9 03/10/2023    AST 14 03/10/2023    ALKPHOS 87 03/10/2023    BILITOT 0.7 03/10/2023     Lab Results   Component Value Date/Time     03/10/2023 05:29 AM    K 4.1 03/10/2023 05:29 AM     03/10/2023 05:29 AM    CO2 20 03/10/2023 05:29 AM    BUN 16 03/10/2023 05:29 AM    CREATININE 1.1 03/10/2023 05:29 AM    CREATININE 1.3 03/09/2023 03:43 AM    CREATININE 1.2 03/08/2023 03:45 AM    GFRAA 47 08/20/2021 02:50 AM    LABGLOM 49 03/10/2023 05:29 AM    GLUCOSE 130 03/10/2023 05:29 AM    GLUCOSE 122 01/27/2012 11:45 AM    PROT 5.4 03/10/2023 05:29 AM    LABALBU 2.6 03/10/2023 05:29 AM    CALCIUM 8.6 03/10/2023 05:29 AM    BILITOT 0.7 03/10/2023 05:29 AM    ALKPHOS 87 03/10/2023 05:29 AM    AST 14 03/10/2023 05:29 AM    ALT 9 03/10/2023 05:29 AM     No results found for: CRP  No results found for: [address]  Radiology:      Microbiology:   Lab Results   Component Value Date/Time    [facility]  03/08/2023 03:59 AM     Gram stain performed from blood culture bottle media  Gram negative rods      BC Sensitivity to follow 03/08/2023 03:59 AM    ORG Escherichia coli 03/08/2023 03:59 AM    ORG Staphylococcus aureus 03/08/2023 03:59 AM    ORG Escherichia coli 03/08/2023 03:45 AM     Lab Results   Component Value Date/Time    BLOODCULT2  03/08/2023 03:59 AM     Gram stain performed from blood culture bottle media  Gram negative rods  Previously positive blood culture called      Caesar Ash  03/08/2023 03:59 AM     Refer to previous culture CXBL 3/8/2023 0359 for susceptibility results    ORG Escherichia coli 03/08/2023 03:59 AM    ORG Staphylococcus aureus 03/08/2023 03:59 AM    ORG Escherichia coli 03/08/2023 03:45 AM     No results found for: WNDABS  No results found for: RESPSMEAR  No results found for: MPNEUMO, CLAMYDCU, LABLEGI, AFBCX, FUNGSM, LABFUNG  No results found for: CULTRESP  No results found for: CXCATHTIP  No results found for: BFCS  No results found for: CXSURG  Urine Culture, Routine   Date Value Ref Range Status   03/08/2023 >100,000 CFU/ml  Final     MRSA Culture Only   Date Value Ref Range Status   08/13/2021 Methicillin resistant Staph aureus not isolated  Final   07/14/2016 Methicillin resistant Staph aureus not isolated  Final   02/10/2015 Methicillin resistant Staph aureus not isolated  Final     No results for input(s): PROCAL in the last 72 hours. Urine culture-ecoli-pan sensitive  Blood culture ecoli      Assessment:  E.coli septicemia from UTI:   Pyelonephritis:   CECY:     Plan:    Cont IV ceftriaxone 1gr daily   Monitor labs  Will follow cx and adjust antibiotics  Staph aureus showed up under blood culture-spoke with micro who reports that is transcription error and report will be adjusted. Will follow with you    ION Curran - CNP  10:33 AM  3/10/2023     Pt seen and examined. Above discussed agree with advanced practice nurse. Labs, cultures, and radiographs reviewed. Face to Face encounter occurred. Changes made as necessary. She feels tired. She is still on and off tachycardic. Cultures reviewed. Continue IV ceftriaxone while inpatient and can do oral cefdinir for total of 10 days when ready for discharge.  (End date 3/17/23)    Kendell Muniz MD

## 2023-03-11 LAB
ALBUMIN SERPL-MCNC: 2.5 G/DL (ref 3.5–5.2)
ALP BLD-CCNC: 98 U/L (ref 35–104)
ALT SERPL-CCNC: 15 U/L (ref 0–32)
ANION GAP SERPL CALCULATED.3IONS-SCNC: 8 MMOL/L (ref 7–16)
AST SERPL-CCNC: 26 U/L (ref 0–31)
BASOPHILS ABSOLUTE: 0.04 E9/L (ref 0–0.2)
BASOPHILS RELATIVE PERCENT: 0.5 % (ref 0–2)
BILIRUB SERPL-MCNC: 0.8 MG/DL (ref 0–1.2)
BUN BLDV-MCNC: 14 MG/DL (ref 6–23)
CALCIUM SERPL-MCNC: 8.6 MG/DL (ref 8.6–10.2)
CHLORIDE BLD-SCNC: 112 MMOL/L (ref 98–107)
CO2: 20 MMOL/L (ref 22–29)
CREAT SERPL-MCNC: 1 MG/DL (ref 0.5–1)
CULTURE, BLOOD 2: ABNORMAL
CULTURE, BLOOD 2: ABNORMAL
EOSINOPHILS ABSOLUTE: 0.36 E9/L (ref 0.05–0.5)
EOSINOPHILS RELATIVE PERCENT: 4.3 % (ref 0–6)
GFR SERPL CREATININE-BSD FRML MDRD: 55 ML/MIN/1.73
GLUCOSE BLD-MCNC: 139 MG/DL (ref 74–99)
HCT VFR BLD CALC: 35.3 % (ref 34–48)
HEMOGLOBIN: 11.1 G/DL (ref 11.5–15.5)
IMMATURE GRANULOCYTES #: 0.12 E9/L
IMMATURE GRANULOCYTES %: 1.4 % (ref 0–5)
LYMPHOCYTES ABSOLUTE: 1.36 E9/L (ref 1.5–4)
LYMPHOCYTES RELATIVE PERCENT: 16.3 % (ref 20–42)
MCH RBC QN AUTO: 31 PG (ref 26–35)
MCHC RBC AUTO-ENTMCNC: 31.4 % (ref 32–34.5)
MCV RBC AUTO: 98.6 FL (ref 80–99.9)
METER GLUCOSE: 124 MG/DL (ref 74–99)
METER GLUCOSE: 133 MG/DL (ref 74–99)
METER GLUCOSE: 151 MG/DL (ref 74–99)
METER GLUCOSE: 184 MG/DL (ref 74–99)
MONOCYTES ABSOLUTE: 0.98 E9/L (ref 0.1–0.95)
MONOCYTES RELATIVE PERCENT: 11.7 % (ref 2–12)
NEUTROPHILS ABSOLUTE: 5.49 E9/L (ref 1.8–7.3)
NEUTROPHILS RELATIVE PERCENT: 65.8 % (ref 43–80)
ORGANISM: ABNORMAL
ORGANISM: ABNORMAL
PDW BLD-RTO: 14.8 FL (ref 11.5–15)
PLATELET # BLD: 218 E9/L (ref 130–450)
PMV BLD AUTO: 10.9 FL (ref 7–12)
POTASSIUM REFLEX MAGNESIUM: 4.1 MMOL/L (ref 3.5–5)
RBC # BLD: 3.58 E12/L (ref 3.5–5.5)
SODIUM BLD-SCNC: 140 MMOL/L (ref 132–146)
TOTAL PROTEIN: 5 G/DL (ref 6.4–8.3)
WBC # BLD: 8.4 E9/L (ref 4.5–11.5)

## 2023-03-11 PROCEDURE — 6370000000 HC RX 637 (ALT 250 FOR IP): Performed by: INTERNAL MEDICINE

## 2023-03-11 PROCEDURE — 6370000000 HC RX 637 (ALT 250 FOR IP): Performed by: CLINICAL NURSE SPECIALIST

## 2023-03-11 PROCEDURE — 6360000002 HC RX W HCPCS: Performed by: INTERNAL MEDICINE

## 2023-03-11 PROCEDURE — 2060000000 HC ICU INTERMEDIATE R&B

## 2023-03-11 PROCEDURE — 36415 COLL VENOUS BLD VENIPUNCTURE: CPT

## 2023-03-11 PROCEDURE — 2580000003 HC RX 258: Performed by: INTERNAL MEDICINE

## 2023-03-11 PROCEDURE — 85025 COMPLETE CBC W/AUTO DIFF WBC: CPT

## 2023-03-11 PROCEDURE — 80053 COMPREHEN METABOLIC PANEL: CPT

## 2023-03-11 PROCEDURE — 6360000002 HC RX W HCPCS

## 2023-03-11 PROCEDURE — 99233 SBSQ HOSP IP/OBS HIGH 50: CPT | Performed by: INTERNAL MEDICINE

## 2023-03-11 PROCEDURE — 82962 GLUCOSE BLOOD TEST: CPT

## 2023-03-11 RX ORDER — CEFDINIR 300 MG/1
300 CAPSULE ORAL EVERY 12 HOURS SCHEDULED
Status: DISCONTINUED | OUTPATIENT
Start: 2023-03-11 | End: 2023-03-14 | Stop reason: HOSPADM

## 2023-03-11 RX ORDER — CEFDINIR 300 MG/1
300 CAPSULE ORAL EVERY 12 HOURS SCHEDULED
Qty: 12 CAPSULE | Refills: 0 | Status: SHIPPED | OUTPATIENT
Start: 2023-03-11 | End: 2023-03-17

## 2023-03-11 RX ADMIN — BUPROPION HYDROCHLORIDE 150 MG: 150 TABLET, EXTENDED RELEASE ORAL at 21:01

## 2023-03-11 RX ADMIN — APIXABAN 5 MG: 5 TABLET, FILM COATED ORAL at 21:01

## 2023-03-11 RX ADMIN — ASPIRIN 81 MG CHEWABLE TABLET 81 MG: 81 TABLET CHEWABLE at 09:46

## 2023-03-11 RX ADMIN — PANTOPRAZOLE SODIUM 40 MG: 40 TABLET, DELAYED RELEASE ORAL at 06:11

## 2023-03-11 RX ADMIN — SODIUM CHLORIDE: 9 INJECTION, SOLUTION INTRAVENOUS at 01:05

## 2023-03-11 RX ADMIN — SODIUM CHLORIDE, PRESERVATIVE FREE 10 ML: 5 INJECTION INTRAVENOUS at 21:01

## 2023-03-11 RX ADMIN — METOPROLOL SUCCINATE 12.5 MG: 25 TABLET, EXTENDED RELEASE ORAL at 09:46

## 2023-03-11 RX ADMIN — CEFTRIAXONE 1000 MG: 1 INJECTION, POWDER, FOR SOLUTION INTRAMUSCULAR; INTRAVENOUS at 05:58

## 2023-03-11 RX ADMIN — CEFDINIR 300 MG: 300 CAPSULE ORAL at 21:01

## 2023-03-11 RX ADMIN — INSULIN GLARGINE 5 UNITS: 100 INJECTION, SOLUTION SUBCUTANEOUS at 21:04

## 2023-03-11 RX ADMIN — LEVOTHYROXINE SODIUM 125 MCG: 0.12 TABLET ORAL at 06:11

## 2023-03-11 RX ADMIN — ENOXAPARIN SODIUM 90 MG: 100 INJECTION SUBCUTANEOUS at 09:47

## 2023-03-11 RX ADMIN — BUPROPION HYDROCHLORIDE 150 MG: 150 TABLET, EXTENDED RELEASE ORAL at 09:46

## 2023-03-11 RX ADMIN — CHOLESTYRAMINE 4 G: 4 POWDER, FOR SUSPENSION ORAL at 09:50

## 2023-03-11 RX ADMIN — SODIUM CHLORIDE, PRESERVATIVE FREE 10 ML: 5 INJECTION INTRAVENOUS at 09:50

## 2023-03-11 RX ADMIN — ACETAMINOPHEN 650 MG: 325 TABLET ORAL at 21:01

## 2023-03-11 ASSESSMENT — PAIN DESCRIPTION - ONSET: ONSET: ON-GOING

## 2023-03-11 ASSESSMENT — PAIN DESCRIPTION - DESCRIPTORS: DESCRIPTORS: ACHING;SORE

## 2023-03-11 ASSESSMENT — PAIN SCALES - GENERAL: PAINLEVEL_OUTOF10: 3

## 2023-03-11 ASSESSMENT — PAIN DESCRIPTION - FREQUENCY: FREQUENCY: CONTINUOUS

## 2023-03-11 ASSESSMENT — PAIN DESCRIPTION - LOCATION: LOCATION: BUTTOCKS

## 2023-03-11 NOTE — PROGRESS NOTES
Subjective: The patient is awake and alert. No problems overnight. Denies chest pain, angina, and dyspnea. Denies abdominal pain. Tolerating diet. No nausea or vomiting. BP is stable, on low dose of metoprolol. On ceftriaxone for E.coli UTI/sepsis. Objective:    /72   Pulse 83   Temp 98.3 °F (36.8 °C) (Oral)   Resp 16   Ht 5' 1\" (1.549 m)   Wt 212 lb 3.2 oz (96.3 kg)   SpO2 95%   BMI 40.09 kg/m²   Neck: No goiter, bruit, or LA  Heart:  AF. no murmurs, gallops, or rubs.   Lungs:  CTA bilaterally, no wheeze, rales or rhonchi  Abd: bowel sounds present, nontender, nondistended, no masses  Extrem:  No clubbing, cyanosis, or edema, 2+ peripheral pulses, FROM    CBC:   Lab Results   Component Value Date/Time    WBC 8.4 03/11/2023 03:30 AM    RBC 3.58 03/11/2023 03:30 AM    HGB 11.1 03/11/2023 03:30 AM    HCT 35.3 03/11/2023 03:30 AM    MCV 98.6 03/11/2023 03:30 AM    MCH 31.0 03/11/2023 03:30 AM    MCHC 31.4 03/11/2023 03:30 AM    RDW 14.8 03/11/2023 03:30 AM     03/11/2023 03:30 AM    MPV 10.9 03/11/2023 03:30 AM     CMP:    Lab Results   Component Value Date/Time     03/11/2023 03:30 AM    K 4.1 03/11/2023 03:30 AM     03/11/2023 03:30 AM    CO2 20 03/11/2023 03:30 AM    BUN 14 03/11/2023 03:30 AM    CREATININE 1.0 03/11/2023 03:30 AM    GFRAA 47 08/20/2021 02:50 AM    LABGLOM 55 03/11/2023 03:30 AM    GLUCOSE 139 03/11/2023 03:30 AM    GLUCOSE 122 01/27/2012 11:45 AM    PROT 5.0 03/11/2023 03:30 AM    LABALBU 2.5 03/11/2023 03:30 AM    CALCIUM 8.6 03/11/2023 03:30 AM    BILITOT 0.8 03/11/2023 03:30 AM    ALKPHOS 98 03/11/2023 03:30 AM    AST 26 03/11/2023 03:30 AM    ALT 15 03/11/2023 03:30 AM          Current Facility-Administered Medications:     metoprolol succinate (TOPROL XL) extended release tablet 12.5 mg, 12.5 mg, Oral, Daily, Kenny Trevizo MD, 12.5 mg at 03/10/23 1232    perflutren lipid microspheres (DEFINITY) injection 1.5 mL, 1.5 mL, IntraVENous, ONCE PRN, Gena Lopez, DO    enoxaparin (LOVENOX) injection 90 mg, 1 mg/kg, SubCUTAneous, BID, Conception Piotr, ION - CNP, 90 mg at 03/10/23 2055    sodium chloride flush 0.9 % injection 10 mL, 10 mL, IntraVENous, 2 times per day, Gena Lopez, DO, 10 mL at 03/10/23 2055    sodium chloride flush 0.9 % injection 10 mL, 10 mL, IntraVENous, PRN, Gena LEWIS Jessica, DO    0.9 % sodium chloride infusion, , IntraVENous, PRN, Gena LEWIS Jessica, DO    potassium chloride (KLOR-CON M) extended release tablet 40 mEq, 40 mEq, Oral, PRN **OR** potassium bicarb-citric acid (EFFER-K) effervescent tablet 40 mEq, 40 mEq, Oral, PRN **OR** potassium chloride 10 mEq/100 mL IVPB (Peripheral Line), 10 mEq, IntraVENous, PRN, Gena Everettiter, DO    ondansetron (ZOFRAN-ODT) disintegrating tablet 4 mg, 4 mg, Oral, Q8H PRN **OR** ondansetron (ZOFRAN) injection 4 mg, 4 mg, IntraVENous, Q6H PRN, Gena Lopez, DO    senna (SENOKOT) tablet 8.6 mg, 1 tablet, Oral, Daily PRN, Gena Lopez, DO, 8.6 mg at 03/10/23 0452    acetaminophen (TYLENOL) tablet 650 mg, 650 mg, Oral, Q6H PRN **OR** acetaminophen (TYLENOL) suppository 650 mg, 650 mg, Rectal, Q6H PRN, Gena Everettiter, DO    insulin lispro (HUMALOG) injection vial 0-4 Units, 0-4 Units, SubCUTAneous, TID WC, Gena Lopez, DO, 1 Units at 03/08/23 1221    insulin lispro (HUMALOG) injection vial 0-4 Units, 0-4 Units, SubCUTAneous, Nightly, Gena Lopez, DO    glucose chewable tablet 16 g, 4 tablet, Oral, PRN, Gena Everettiter, DO    dextrose bolus 10% 125 mL, 125 mL, IntraVENous, PRN **OR** dextrose bolus 10% 250 mL, 250 mL, IntraVENous, PRN, Gena Lopez DO    glucagon injection 1 mg, 1 mg, SubCUTAneous, PRN, Gena Lopez DO    dextrose 10 % infusion, , IntraVENous, Continuous PRN, Gena Lopez DO    buPROPion [facility]) extended release tablet 150 mg, 150 mg, Oral, BID, Gena Lopez DO, 150 mg at 03/10/23 2055    cholestyramine light packet 4 g, 4 g, Oral, Daily, Gena E Jessica, DO, 4 g at 03/10/23 1053    [Held by provider] isosorbide mononitrate (IMDUR) extended release tablet 30 mg, 30 mg, Oral, Daily, Gena E Jessica, DO, 30 mg at 03/08/23 0956    levothyroxine (SYNTHROID) tablet 125 mcg, 125 mcg, Oral, Daily, Gena E Jessica, DO, 125 mcg at 03/11/23 0611    pantoprazole (PROTONIX) tablet 40 mg, 40 mg, Oral, QAM AC, Gena E Jessica, DO, 40 mg at 03/11/23 0611    0.9 % sodium chloride infusion, , IntraVENous, Continuous, Gena JOSHUA Jessica, DO, Last Rate: 75 mL/hr at 03/11/23 0107, Rate Verify at 03/11/23 0107    aspirin chewable tablet 81 mg, 81 mg, Oral, Daily, Gena E Jessica, DO, 81 mg at 03/10/23 1053    cefTRIAXone (ROCEPHIN) 1,000 mg in sterile water 10 mL IV syringe, 1,000 mg, IntraVENous, Q24H, Gena E Jessica, DO, 1,000 mg at 03/11/23 0558    insulin glargine (LANTUS) injection vial 5 Units, 5 Units, SubCUTAneous, Nightly, Gena E Jessica, DO, 5 Units at 03/10/23 2055    Assessment:    Patient Active Problem List   Diagnosis    Primary osteoarthritis of right hip    Gout    Depression    Hyperlipidemia    HBP (high blood pressure)    Hypothyroid    H/O total hip arthroplasty (11-21-13: [facility])(Esequiel Lomeli MD)    CAD (coronary artery disease)    Primary osteoarthritis of right knee    S/P R total knee arthroplasty (2-19-15: Dr. Doroteo Fontaine)    Primary osteoarthritis of left knee    S/P L total knee arthroplasty (7-19-16: Sharmaine Barajas M.D.)    Diabetes mellitus [facility])    Primary osteoarthritis of left hip    Pyelonephritis       Plan:  E. Coli septicemia/complicated UTI- on cefriaxone. No fever or chills. No leukocytosis. ID on case  AF with RVR- HR controlled on low dose metoprolol. She is on lovenox. Will defer choice of [facility] to cardiology, Normal EF without significant valvular disease on echo  DM- controlled  HTN- controlled. Previous hypotension appears resolved.  Remains off lisinopril          Parviz Ko MD  8:24 AM  3/11/2023

## 2023-03-11 NOTE — PROGRESS NOTES
[address] [place] [address] [facility]  Progress Note    SUBJECTIVE:  Chief Complaint   Patient presents with    Abdominal Pain    Nausea     N/v starting tonight, daughter in law reports she was fine going to bed     Patient is tolerating medications. No reported adverse drug reactions. No nausea, vomiting, diarrhea. Review of systems:  As stated above in the chief complaint, otherwise negative. Medications:  Scheduled Meds:   metoprolol succinate  12.5 mg Oral Daily    enoxaparin  1 mg/kg SubCUTAneous BID    sodium chloride flush  10 mL IntraVENous 2 times per day    insulin lispro  0-4 Units SubCUTAneous TID WC    insulin lispro  0-4 Units SubCUTAneous Nightly    buPROPion  150 mg Oral BID    cholestyramine light  4 g Oral Daily    [Held by provider] isosorbide mononitrate  30 mg Oral Daily    levothyroxine  125 mcg Oral Daily    pantoprazole  40 mg Oral QAM AC    aspirin  81 mg Oral Daily    cefTRIAXone (ROCEPHIN) IV  1,000 mg IntraVENous Q24H    insulin glargine  5 Units SubCUTAneous Nightly     Continuous Infusions:   sodium chloride      dextrose       PRN Meds:perflutren lipid microspheres, sodium chloride flush, sodium chloride, potassium chloride **OR** potassium alternative oral replacement **OR** potassium chloride, ondansetron **OR** ondansetron, senna, acetaminophen **OR** acetaminophen, glucose, dextrose bolus **OR** dextrose bolus, glucagon (rDNA), dextrose    OBJECTIVE:  /64   Pulse 86   Temp 98.1 °F (36.7 °C) (Temporal)   Resp 18   Ht 5' 1\" (1.549 m)   Wt 212 lb 3.2 oz (96.3 kg)   SpO2 97%   BMI 40.09 kg/m²   Temp  Av.9 °F (36.6 °C)  Min: 97.5 °F (36.4 °C)  Max: 98.3 °F (36.8 °C)  Constitutional: The patient is awake, alert, and oriented. Skin: Warm and dry. No rashes were noted. HEENT: Round and reactive pupils. Moist mucous membranes. No ulcerations or thrush. Neck: Supple to movements. Chest: No use of accessory muscles to breathe.  Symmetrical expansion. No wheezing, crackles or rhonchi. Cardiovascular: S1 and S2 are rhythmic and regular. No murmurs appreciated. Abdomen: Positive bowel sounds to auscultation. Benign to palpation. No masses felt. No hepatosplenomegaly. Pure wick   Extremities: No clubbing, no cyanosis, no edema.   Lines: Peripheral.    Laboratory and Tests Review:  Lab Results   Component Value Date    WBC 8.4 03/11/2023    WBC 8.4 03/10/2023    WBC 9.3 03/09/2023    HGB 11.1 (L) 03/11/2023    HCT 35.3 03/11/2023    MCV 98.6 03/11/2023     03/11/2023     Lab Results   Component Value Date    NEUTROABS 5.49 03/11/2023    NEUTROABS 5.88 03/10/2023    NEUTROABS 7.04 03/09/2023     No results found for: CRPHS  Lab Results   Component Value Date    ALT 15 03/11/2023    AST 26 03/11/2023    ALKPHOS 98 03/11/2023    BILITOT 0.8 03/11/2023     Lab Results   Component Value Date/Time     03/11/2023 03:30 AM    K 4.1 03/11/2023 03:30 AM     03/11/2023 03:30 AM    CO2 20 03/11/2023 03:30 AM    BUN 14 03/11/2023 03:30 AM    CREATININE 1.0 03/11/2023 03:30 AM    CREATININE 1.1 03/10/2023 05:29 AM    CREATININE 1.3 03/09/2023 03:43 AM    GFRAA 47 08/20/2021 02:50 AM    LABGLOM 55 03/11/2023 03:30 AM    GLUCOSE 139 03/11/2023 03:30 AM    GLUCOSE 122 01/27/2012 11:45 AM    PROT 5.0 03/11/2023 03:30 AM    LABALBU 2.5 03/11/2023 03:30 AM    CALCIUM 8.6 03/11/2023 03:30 AM    BILITOT 0.8 03/11/2023 03:30 AM    ALKPHOS 98 03/11/2023 03:30 AM    AST 26 03/11/2023 03:30 AM    ALT 15 03/11/2023 03:30 AM     No results found for: CRP  No results found for: [address]  Radiology:      Microbiology:     Urine culture-ecoli-pan sensitive  Blood culture ecoli      Assessment:  E.coli septicemia from UTI: Improving  Pyelonephritis: Resolving  CECY:     Plan:    Cont IV ceftriaxone 1g--switch her to cefdinir through 3/17/2023  Monitor labs  Will follow cx and adjust antibiotics  Staph aureus showed up under blood culture-spoke with micro who reports that is transcription error and report will be adjusted.   Will follow with you  DC to facility when ready     ION Fountain CNS  11:39 AM  3/11/2023     Patient seen and examined. I had a face to face encounter with the patient. Agree with exam.  Assessment and plan as outlined above and directed by me. Addition and corrections were done as deemed appropriate. My exam and plan include: The patient is doing well.  No new complaints.  She is afebrile.  White count is normal.  Ceftriaxone will be changed over to oral Cefdinir.    Carmelo Vega MD  3/11/2023  12:10 PM

## 2023-03-11 NOTE — PROGRESS NOTES
[facility]) [facility]        CARDIOLOGY                 INPATIENT PROGRESS NOTE          PATIENT SEEN IN FOLLOW UP FOR: A fib    Hospital Day: 4     Grzegorz [place] is a 82year old patient known to Dr. Tomasz Garibay from initial consult       SUBJECTIVE: Denies any CP or heart racing - had Echo yesterday    ROS: Review of rest of 10 systems negative except as mentioned above    OBJECTIVE: No acute distress. See Assessment     Diagnostics:       Telemetry:  Reviewed     Summary   Technically difficult examination. Mild concentric left ventricular hypertrophy. Ejection fraction is visually estimated at 60 to 65%. Normal right ventricular size and function. Calcification of the mitral valve noted. Mild mitral regurgitation is present. Mild tricuspid regurgitation. RVSP is 63 mmHg. Mild pulmonic regurgitation present. The mid ascending aorta measures 3.6cm      Intake/Output Summary (Last 24 hours) at 3/11/2023 1248  Last data filed at 3/11/2023 0107  Gross per 24 hour   Intake 3822.01 ml   Output 725 ml   Net 3097.01 ml       Labs:   CBC:   Recent Labs     03/10/23  0615 23  0330   WBC 8.4 8.4   HGB 11.1* 11.1*   HCT 35.3 35.3    218     BMP:   Recent Labs     03/10/23  0529 23  0330    140   K 4.1 4.1   CO2 20* 20*   BUN 16 14   CREATININE 1.1* 1.0   LABGLOM 49 55   CALCIUM 8.6 8.6     Mag:   Recent Labs     23  0343   MG 1.7     Phos: No results for input(s): PHOS in the last 72 hours. TSH:   Recent Labs     23  0343   TSH 0.780     HgA1c:     BNP: No results for input(s): BNP in the last 72 hours. PT/INR: No results for input(s): PROTIME, INR in the last 72 hours. APTT:No results for input(s): APTT in the last 72 hours.   CARDIAC ENZYMES:  Recent Labs     23  0343   TROPHS 34*     FASTING LIPID PANEL:No results found for: CHOL, HDL, LDLDIRECT, LDLCALC, TRIG  LIVER PROFILE:  Recent Labs     03/10/23  0529 23  0330   AST 14 26   ALT 9 15 LABALBU 2.6* 2.5*       Current Inpatient Medications:   cefdinir  300 mg Oral 2 times per day    metoprolol succinate  12.5 mg Oral Daily    enoxaparin  1 mg/kg SubCUTAneous BID    sodium chloride flush  10 mL IntraVENous 2 times per day    insulin lispro  0-4 Units SubCUTAneous TID WC    insulin lispro  0-4 Units SubCUTAneous Nightly    buPROPion  150 mg Oral BID    cholestyramine light  4 g Oral Daily    [Held by provider] isosorbide mononitrate  30 mg Oral Daily    levothyroxine  125 mcg Oral Daily    pantoprazole  40 mg Oral QAM AC    aspirin  81 mg Oral Daily    insulin glargine  5 Units SubCUTAneous Nightly       IV Infusions (if any):   sodium chloride      dextrose           PHYSICAL EXAM:     CONSTITUTIONAL:   /64   Pulse 86   Temp 98.1 °F (36.7 °C) (Temporal)   Resp 18   Ht 5' 1\" (1.549 m)   Wt 212 lb 3.2 oz (96.3 kg)   SpO2 97%   BMI 40.09 kg/m²   Pulse  Av.6  Min: 78  Max: 86  Systolic (75TYA), PSO:474 , Min:108 , GCV:669    Diastolic (65XNU), TGO:03, Min:46, Max:72    In general, this is a well developed, well nourished who appears stated age, awake, alert, no distress  Neck-  no stridor, no noted enlargement of the thyroid, no carotid bruit. no jugular venous distention   RESPIRATORY: Chest symmetrical   No accessory muscle use. Lung auscultation - few rhonchi  CARDIOVASCULAR:     Heart Palpation - no palpable thrills    Heart Ausculation - Irregularly irregular rate and rhythm, 2/6 systolic murmur, No s3 or rub. No lower extremity edema,  Distal pulses palpable, no c cyanosis   ABDOMEN: Soft, nontender, nondistended. Bowel sounds present. MS: n/a  : Deferred  Rectal Exam: Deferred  SKIN: warm and dry no   NEURO / PSYCH: oriented to person, place        ASSESSMENT/PLAN:    Pyelonephritis - Per Dr Yesica PATEL Fib  - New, High [id number] VASc score - Rate control with low dose BB -If needed add Digoxin 0.125mg;  On Lovenox-transition to [facility] -risk benefits discussed, agreeable for Eliquis 5mg BID    VHD - Mild MR, TR    Hx of CAD    Essential HTN    Morbid obesity - BMI 40.09 - Diet, exercise as tolerates and weight loss discussed. BP an HR stable, Cardiology will sign off - please call if needed    F/u by Dr Pascale Baltazar, at [facility],  2-3 weeks    Echo results and above recommendations discussed with her.       Electronically signed by Loida Roberson MD on 3/11/2023 at 12:48 PM  [facility]) Cardiology

## 2023-03-12 LAB
METER GLUCOSE: 124 MG/DL (ref 74–99)
METER GLUCOSE: 136 MG/DL (ref 74–99)
METER GLUCOSE: 138 MG/DL (ref 74–99)
METER GLUCOSE: 162 MG/DL (ref 74–99)

## 2023-03-12 PROCEDURE — 6370000000 HC RX 637 (ALT 250 FOR IP): Performed by: CLINICAL NURSE SPECIALIST

## 2023-03-12 PROCEDURE — 6370000000 HC RX 637 (ALT 250 FOR IP): Performed by: INTERNAL MEDICINE

## 2023-03-12 PROCEDURE — 82962 GLUCOSE BLOOD TEST: CPT

## 2023-03-12 PROCEDURE — 2060000000 HC ICU INTERMEDIATE R&B

## 2023-03-12 PROCEDURE — 2580000003 HC RX 258: Performed by: INTERNAL MEDICINE

## 2023-03-12 RX ADMIN — BUPROPION HYDROCHLORIDE 150 MG: 150 TABLET, EXTENDED RELEASE ORAL at 20:38

## 2023-03-12 RX ADMIN — APIXABAN 5 MG: 5 TABLET, FILM COATED ORAL at 20:39

## 2023-03-12 RX ADMIN — BUPROPION HYDROCHLORIDE 150 MG: 150 TABLET, EXTENDED RELEASE ORAL at 09:06

## 2023-03-12 RX ADMIN — APIXABAN 5 MG: 5 TABLET, FILM COATED ORAL at 09:06

## 2023-03-12 RX ADMIN — SODIUM CHLORIDE, PRESERVATIVE FREE 10 ML: 5 INJECTION INTRAVENOUS at 20:38

## 2023-03-12 RX ADMIN — SODIUM CHLORIDE, PRESERVATIVE FREE 10 ML: 5 INJECTION INTRAVENOUS at 09:06

## 2023-03-12 RX ADMIN — CEFDINIR 300 MG: 300 CAPSULE ORAL at 09:06

## 2023-03-12 RX ADMIN — CHOLESTYRAMINE 4 G: 4 POWDER, FOR SUSPENSION ORAL at 09:06

## 2023-03-12 RX ADMIN — ASPIRIN 81 MG CHEWABLE TABLET 81 MG: 81 TABLET CHEWABLE at 09:06

## 2023-03-12 RX ADMIN — LEVOTHYROXINE SODIUM 125 MCG: 0.12 TABLET ORAL at 06:25

## 2023-03-12 RX ADMIN — CEFDINIR 300 MG: 300 CAPSULE ORAL at 20:39

## 2023-03-12 RX ADMIN — METOPROLOL SUCCINATE 12.5 MG: 25 TABLET, EXTENDED RELEASE ORAL at 09:06

## 2023-03-12 RX ADMIN — PANTOPRAZOLE SODIUM 40 MG: 40 TABLET, DELAYED RELEASE ORAL at 06:25

## 2023-03-12 RX ADMIN — INSULIN GLARGINE 5 UNITS: 100 INJECTION, SOLUTION SUBCUTANEOUS at 20:39

## 2023-03-12 NOTE — PROGRESS NOTES
Subjective: The patient is awake and alert. No problems overnight. Denies chest pain, angina, and dyspnea. Denies abdominal pain. Tolerating diet. No nausea or vomiting. Objective:    BP (!) 118/57   Pulse 84   Temp 98.2 °F (36.8 °C) (Oral)   Resp 18   Ht 5' 1\" (1.549 m)   Wt 212 lb 3.2 oz (96.3 kg)   SpO2 97%   BMI 40.09 kg/m²   Neck: No goiter, bruit, or LA  Heart: AF with CVR. , no murmurs, gallops, or rubs.   Lungs:  CTA bilaterally, no wheeze, rales or rhonchi  Abd: bowel sounds present, nontender, nondistended, no masses  Extrem:  No clubbing, cyanosis, or edema, 2+ peripheral pulses, FROM    CBC:   Lab Results   Component Value Date/Time    WBC 8.4 03/11/2023 03:30 AM    RBC 3.58 03/11/2023 03:30 AM    HGB 11.1 03/11/2023 03:30 AM    HCT 35.3 03/11/2023 03:30 AM    MCV 98.6 03/11/2023 03:30 AM    MCH 31.0 03/11/2023 03:30 AM    MCHC 31.4 03/11/2023 03:30 AM    RDW 14.8 03/11/2023 03:30 AM     03/11/2023 03:30 AM    MPV 10.9 03/11/2023 03:30 AM     CMP:    Lab Results   Component Value Date/Time     03/11/2023 03:30 AM    K 4.1 03/11/2023 03:30 AM     03/11/2023 03:30 AM    CO2 20 03/11/2023 03:30 AM    BUN 14 03/11/2023 03:30 AM    CREATININE 1.0 03/11/2023 03:30 AM    GFRAA 47 08/20/2021 02:50 AM    LABGLOM 55 03/11/2023 03:30 AM    GLUCOSE 139 03/11/2023 03:30 AM    GLUCOSE 122 01/27/2012 11:45 AM    PROT 5.0 03/11/2023 03:30 AM    LABALBU 2.5 03/11/2023 03:30 AM    CALCIUM 8.6 03/11/2023 03:30 AM    BILITOT 0.8 03/11/2023 03:30 AM    ALKPHOS 98 03/11/2023 03:30 AM    AST 26 03/11/2023 03:30 AM    ALT 15 03/11/2023 03:30 AM          Current Facility-Administered Medications:     cefdinir (OMNICEF) capsule 300 mg, 300 mg, Oral, 2 times per day, ION Ibrahim - CNS, 300 mg at 03/12/23 0906    apixaban (ELIQUIS) tablet 5 mg, 5 mg, Oral, BID, Fred Dale MD, 5 mg at 03/12/23 0906    metoprolol succinate (TOPROL XL) extended release tablet 12.5 mg, 12.5 mg, Oral, Daily, Kenny Trevizo MD, 12.5 mg at 03/12/23 1150    perflutren lipid microspheres (DEFINITY) injection 1.5 mL, 1.5 mL, IntraVENous, ONCE PRN, Gena LEWIS Jessica, DO    sodium chloride flush 0.9 % injection 10 mL, 10 mL, IntraVENous, 2 times per day, Gena LEWIS Jessica, DO, 10 mL at 03/12/23 0906    sodium chloride flush 0.9 % injection 10 mL, 10 mL, IntraVENous, PRN, Gena E Jessica, DO    0.9 % sodium chloride infusion, , IntraVENous, PRN, Gena E Jessica, DO    potassium chloride (KLOR-CON M) extended release tablet 40 mEq, 40 mEq, Oral, PRN **OR** potassium bicarb-citric acid (EFFER-K) effervescent tablet 40 mEq, 40 mEq, Oral, PRN **OR** potassium chloride 10 mEq/100 mL IVPB (Peripheral Line), 10 mEq, IntraVENous, PRN, Gena E Jessica, DO    ondansetron (ZOFRAN-ODT) disintegrating tablet 4 mg, 4 mg, Oral, Q8H PRN **OR** ondansetron (ZOFRAN) injection 4 mg, 4 mg, IntraVENous, Q6H PRN, Gena E Jessica, DO    senna (SENOKOT) tablet 8.6 mg, 1 tablet, Oral, Daily PRN, Gena LEWIS Jessica, DO, 8.6 mg at 03/10/23 0452    acetaminophen (TYLENOL) tablet 650 mg, 650 mg, Oral, Q6H PRN, 650 mg at 03/11/23 2101 **OR** acetaminophen (TYLENOL) suppository 650 mg, 650 mg, Rectal, Q6H PRN, Gena E Jessica, DO    insulin lispro (HUMALOG) injection vial 0-4 Units, 0-4 Units, SubCUTAneous, TID WC, Gena LEWIS Jessica, DO, 1 Units at 03/08/23 1221    insulin lispro (HUMALOG) injection vial 0-4 Units, 0-4 Units, SubCUTAneous, Nightly, Gena LEWIS Jessica, DO    glucose chewable tablet 16 g, 4 tablet, Oral, PRN, Gena E Jessica, DO    dextrose bolus 10% 125 mL, 125 mL, IntraVENous, PRN **OR** dextrose bolus 10% 250 mL, 250 mL, IntraVENous, PRN, Gena Lopez DO    glucagon injection 1 mg, 1 mg, SubCUTAneous, PRN, Gena Lopez DO    dextrose 10 % infusion, , IntraVENous, Continuous PRN, Gena Lopez DO    buPROPion [facility]) extended release tablet 150 mg, 150 mg, Oral, BID, Gena Lopez DO, 150 mg at 03/12/23 3164    cholestyramine light packet 4 g, 4 g, Oral, Daily, Gena E Jessica, DO, 4 g at 03/12/23 0906    [Held by provider] isosorbide mononitrate (IMDUR) extended release tablet 30 mg, 30 mg, Oral, Daily, Gena E Jessica, DO, 30 mg at 03/08/23 0956    levothyroxine (SYNTHROID) tablet 125 mcg, 125 mcg, Oral, Daily, Gena E Jessica, DO, 125 mcg at 03/12/23 3937    pantoprazole (PROTONIX) tablet 40 mg, 40 mg, Oral, QAM AC, Gena E Jessica, DO, 40 mg at 03/12/23 8372    aspirin chewable tablet 81 mg, 81 mg, Oral, Daily, Gena E Jessica, DO, 81 mg at 03/12/23 0906    insulin glargine (LANTUS) injection vial 5 Units, 5 Units, SubCUTAneous, Nightly, Gena E Jessica, DO, 5 Units at 03/11/23 2104    Assessment:    Patient Active Problem List   Diagnosis    Primary osteoarthritis of right hip    Gout    Depression    Hyperlipidemia    HBP (high blood pressure)    Hypothyroid    H/O total hip arthroplasty (11-21-13: [facility])(Esequiel Lomeli MD)    CAD (coronary artery disease)    Primary osteoarthritis of right knee    S/P R total knee arthroplasty (2-19-15: Dr. Matt Francis)    Primary osteoarthritis of left knee    S/P L total knee arthroplasty (7-19-16: Tran Cooney M.D.)    Diabetes mellitus [facility])    Primary osteoarthritis of left hip    Pyelonephritis       Plan:  AF- presenting with RVR. HR improved on low dose of metoprolol. On eliquis  UTI/pyelonephritis/E.cli sepsis- on cefdinir  DM- controlled  HTN- controlled.  Previous hypotension resolved        Ariela Pedersen MD  10:04 AM  3/12/2023

## 2023-03-12 NOTE — PROGRESS NOTES
[address] [place] [address] [facility]  Progress Note    SUBJECTIVE:  Chief Complaint   Patient presents with    Abdominal Pain    Nausea     N/v starting tonight, daughter in law reports she was fine going to bed     Patient is tolerating medications. No reported adverse drug reactions. No dyspnea. No pain. No diarrhea. No dysuria. Review of systems:  As stated above in the chief complaint, otherwise negative. Medications:  Scheduled Meds:   cefdinir  300 mg Oral 2 times per day    apixaban  5 mg Oral BID    metoprolol succinate  12.5 mg Oral Daily    sodium chloride flush  10 mL IntraVENous 2 times per day    insulin lispro  0-4 Units SubCUTAneous TID WC    insulin lispro  0-4 Units SubCUTAneous Nightly    buPROPion  150 mg Oral BID    cholestyramine light  4 g Oral Daily    [Held by provider] isosorbide mononitrate  30 mg Oral Daily    levothyroxine  125 mcg Oral Daily    pantoprazole  40 mg Oral QAM AC    aspirin  81 mg Oral Daily    insulin glargine  5 Units SubCUTAneous Nightly     Continuous Infusions:   sodium chloride      dextrose       PRN Meds:perflutren lipid microspheres, sodium chloride flush, sodium chloride, potassium chloride **OR** potassium alternative oral replacement **OR** potassium chloride, ondansetron **OR** ondansetron, senna, acetaminophen **OR** acetaminophen, glucose, dextrose bolus **OR** dextrose bolus, glucagon (rDNA), dextrose    OBJECTIVE:  BP (!) 118/57   Pulse 84   Temp 98.2 °F (36.8 °C) (Oral)   Resp 18   Ht 5' 1\" (1.549 m)   Wt 212 lb 3.2 oz (96.3 kg)   SpO2 97%   BMI 40.09 kg/m²   Temp  Av.8 °F (36.6 °C)  Min: 97.4 °F (36.3 °C)  Max: 98.2 °F (36.8 °C)  Constitutional: The patient is sitting in a chair. Awake and alert. No distress. Eating lunch. Skin: Warm and dry. No rashes were noted. HEENT: Round and reactive pupils. Moist mucous membranes. No ulcerations or thrush. Neck: Supple to movements. Chest: No respiratory distress.   No crackles. Cardiovascular: S1 and S2 are rhythmic and regular. No murmurs appreciated. Abdomen: Positive bowel sounds to auscultation. Pure wick   Extremities: No edema. Lines: Peripheral.    Laboratory and Tests Review:  Lab Results   Component Value Date    WBC 8.4 03/11/2023    WBC 8.4 03/10/2023    WBC 9.3 03/09/2023    HGB 11.1 (L) 03/11/2023    HCT 35.3 03/11/2023    MCV 98.6 03/11/2023     03/11/2023     Lab Results   Component Value Date    NEUTROABS 5.49 03/11/2023    NEUTROABS 5.88 03/10/2023    NEUTROABS 7.04 03/09/2023     No results found for: CRPHS  Lab Results   Component Value Date    ALT 15 03/11/2023    AST 26 03/11/2023    ALKPHOS 98 03/11/2023    BILITOT 0.8 03/11/2023     Lab Results   Component Value Date/Time     03/11/2023 03:30 AM    K 4.1 03/11/2023 03:30 AM     03/11/2023 03:30 AM    CO2 20 03/11/2023 03:30 AM    BUN 14 03/11/2023 03:30 AM    CREATININE 1.0 03/11/2023 03:30 AM    CREATININE 1.1 03/10/2023 05:29 AM    CREATININE 1.3 03/09/2023 03:43 AM    GFRAA 47 08/20/2021 02:50 AM    LABGLOM 55 03/11/2023 03:30 AM    GLUCOSE 139 03/11/2023 03:30 AM    GLUCOSE 122 01/27/2012 11:45 AM    PROT 5.0 03/11/2023 03:30 AM    LABALBU 2.5 03/11/2023 03:30 AM    CALCIUM 8.6 03/11/2023 03:30 AM    BILITOT 0.8 03/11/2023 03:30 AM    ALKPHOS 98 03/11/2023 03:30 AM    AST 26 03/11/2023 03:30 AM    ALT 15 03/11/2023 03:30 AM     No results found for: CRP  No results found for: [address]  Radiology:      Microbiology:     Urine culture-ecoli-pan sensitive  Blood culture ecoli      Assessment:  E.coli septicemia from UTI: Improving  Pyelonephritis: Resolving  CECY:     Plan:    Continue Cefdinir Through 3/17/2023  Staph aureus showed up under blood culture-spoke with micro who reports that is transcription error and report will be adjusted.    Will follow with you  DC to facility when ready     Jose De Dios MD  11:59 AM  3/12/2023

## 2023-03-13 VITALS
BODY MASS INDEX: 40.06 KG/M2 | RESPIRATION RATE: 16 BRPM | WEIGHT: 212.2 LBS | OXYGEN SATURATION: 97 % | DIASTOLIC BLOOD PRESSURE: 74 MMHG | HEART RATE: 74 BPM | HEIGHT: 61 IN | TEMPERATURE: 98.2 F | SYSTOLIC BLOOD PRESSURE: 144 MMHG

## 2023-03-13 LAB
METER GLUCOSE: 119 MG/DL (ref 74–99)
METER GLUCOSE: 132 MG/DL (ref 74–99)
METER GLUCOSE: 172 MG/DL (ref 74–99)
METER GLUCOSE: 206 MG/DL (ref 74–99)
SARS-COV-2, NAAT: NOT DETECTED

## 2023-03-13 PROCEDURE — 82962 GLUCOSE BLOOD TEST: CPT

## 2023-03-13 PROCEDURE — 6370000000 HC RX 637 (ALT 250 FOR IP): Performed by: INTERNAL MEDICINE

## 2023-03-13 PROCEDURE — 6370000000 HC RX 637 (ALT 250 FOR IP): Performed by: CLINICAL NURSE SPECIALIST

## 2023-03-13 PROCEDURE — 97530 THERAPEUTIC ACTIVITIES: CPT

## 2023-03-13 PROCEDURE — 2580000003 HC RX 258: Performed by: INTERNAL MEDICINE

## 2023-03-13 PROCEDURE — 87635 SARS-COV-2 COVID-19 AMP PRB: CPT

## 2023-03-13 RX ORDER — INSULIN DETEMIR 100 [IU]/ML
5 INJECTION, SOLUTION SUBCUTANEOUS NIGHTLY
Qty: 1 ML | Refills: 0 | Status: SHIPPED | OUTPATIENT
Start: 2023-03-13 | End: 2023-04-12

## 2023-03-13 RX ORDER — METOPROLOL SUCCINATE 25 MG/1
12.5 TABLET, EXTENDED RELEASE ORAL DAILY
Qty: 30 TABLET | Refills: 3 | Status: SHIPPED | OUTPATIENT
Start: 2023-03-13

## 2023-03-13 RX ORDER — POLYETHYLENE GLYCOL 3350 17 G/17G
17 POWDER, FOR SOLUTION ORAL DAILY
Status: DISCONTINUED | OUTPATIENT
Start: 2023-03-13 | End: 2023-03-14 | Stop reason: HOSPADM

## 2023-03-13 RX ORDER — POLYETHYLENE GLYCOL 3350 17 G/17G
17 POWDER, FOR SOLUTION ORAL DAILY
Qty: 527 G | Refills: 1 | Status: SHIPPED | OUTPATIENT
Start: 2023-03-13 | End: 2023-04-12

## 2023-03-13 RX ADMIN — PANTOPRAZOLE SODIUM 40 MG: 40 TABLET, DELAYED RELEASE ORAL at 05:35

## 2023-03-13 RX ADMIN — METOPROLOL SUCCINATE 12.5 MG: 25 TABLET, EXTENDED RELEASE ORAL at 08:33

## 2023-03-13 RX ADMIN — CEFDINIR 300 MG: 300 CAPSULE ORAL at 08:33

## 2023-03-13 RX ADMIN — INSULIN LISPRO 1 UNITS: 100 INJECTION, SOLUTION INTRAVENOUS; SUBCUTANEOUS at 11:06

## 2023-03-13 RX ADMIN — ASPIRIN 81 MG CHEWABLE TABLET 81 MG: 81 TABLET CHEWABLE at 08:33

## 2023-03-13 RX ADMIN — BUPROPION HYDROCHLORIDE 150 MG: 150 TABLET, EXTENDED RELEASE ORAL at 08:33

## 2023-03-13 RX ADMIN — APIXABAN 5 MG: 5 TABLET, FILM COATED ORAL at 08:33

## 2023-03-13 RX ADMIN — CEFDINIR 300 MG: 300 CAPSULE ORAL at 20:03

## 2023-03-13 RX ADMIN — SODIUM CHLORIDE, PRESERVATIVE FREE 10 ML: 5 INJECTION INTRAVENOUS at 08:33

## 2023-03-13 RX ADMIN — APIXABAN 5 MG: 5 TABLET, FILM COATED ORAL at 20:03

## 2023-03-13 RX ADMIN — POLYETHYLENE GLYCOL 3350 17 G: 17 POWDER, FOR SOLUTION ORAL at 08:31

## 2023-03-13 RX ADMIN — LEVOTHYROXINE SODIUM 125 MCG: 0.12 TABLET ORAL at 05:34

## 2023-03-13 RX ADMIN — BUPROPION HYDROCHLORIDE 150 MG: 150 TABLET, EXTENDED RELEASE ORAL at 20:03

## 2023-03-13 ASSESSMENT — ENCOUNTER SYMPTOMS
NAUSEA: 0
COUGH: 0
VOMITING: 0
SHORTNESS OF BREATH: 0
DIARRHEA: 0

## 2023-03-13 NOTE — CARE COORDINATION
Awaiting BM. PT am-pac 16. Precert now good through 3/14. Will need COVID test on day of discharge- can go to facility if test is positive. NOAH in epic, [employer] and ambulette transport form on chart. Linda Tavera RN case manager      [id number]: COVID pending. Discharge to [facility] today. Ambulette transport set up w/ [facility]-  time 7 pm. Nursing, facility, VM left w/ son Yaz Lacyner [phone] notifying of discharge destination and time.  NOAH in epic, [employer] and ambulette transport form on chart Linda Tavera RN`

## 2023-03-13 NOTE — PROGRESS NOTES
Occupational Therapy  OT BEDSIDE TREATMENT NOTE      Date:3/13/2023  Patient Name: Roberto Patiño  MRN: [id number]  : 1935  Room: [address] [place] [address]     Evaluating OT: HANNAH Ash/L - [phone].[phone]     Referring Provider: Seamus Lowe DO  Specific Provider Orders/Date: \"OT eval and treat\" - 3/8/2023     Diagnosis: Dehydration [E86.0]  Pyelonephritis [N12]  Septicemia ([facility] [A41.9]  CECY (acute kidney injury) ([facility] [N17.9]  Acute hypoxemic respiratory failure (HCC) [J96.01]  Nausea and vomiting, unspecified vomiting type [R11.2]     Patient reported that she has fallen twice recently at home. Pertinent Medical History: CAD, OA, DM, depression, HTN, MI      Precautions: fall risk     Assessment of Current Deficits:    [x] Functional mobility             [x]ADLs           [x] Strength                  [x]Cognition   [x] Functional transfers           [x] IADLs         [x] Safety Awareness   [x]Endurance   [] Fine Coordination              [x] Balance      [] Vision/perception   [x]Sensation     []Gross Motor Coordination  [] ROM           [] Delirium                   [] Motor Control      OT PLAN OF CARE   OT POC is based on physician orders, patient diagnosis, and results of clinical assessment.   Frequency/Duration 2-5 days/week for 2 weeks PRN   Specific OT Treatment Interventions to Include:   * Instruction/training on adapted ADL techniques and AE recommendations to increase functional independence within precautions       * Training on energy conservation strategies, correct breathing pattern and techniques to improve independence/tolerance for self-care routine  * Functional transfer/mobility training/DME recommendations for increased independence, safety, and fall prevention  * Patient/Family education to increase follow through with safety techniques and functional independence  * Recommendation of environmental modifications for increased safety with functional transfers/mobility and ADLs  * Cognitive retraining/development of therapeutic activities to improve problem solving, judgement, memory, and attention for increased safety/participation in ADL/IADL tasks  * Therapeutic exercise to improve motor endurance, ROM, and functional strength for ADLs/functional transfers  * Therapeutic activities to facilitate/challenge dynamic balance, stand tolerance for increased safety and independence with ADLs  * Neuro-muscular re-education: facilitation of righting/equilibrium reactions, midline orientation, scapular stability/mobility, normalization of muscle tone, and facilitation of volitional active controled movement  * Positioning to improve skin integrity, interaction with environment and functional independence     Recommended Adaptive Equipment: continue to assess       Home Living: Patient lives alone in a one-floor home; laundry is in the basement. Bathroom Setup: walk-in shower in basement; patient noted that she is unable to get in/out of the tub shower on the main living level  Equipment Owned: cane     Prior Level of Function (PLOF): Patient reported that she was independent with ADLs, IADLs, and functional mobility (with use of cane when out of her home) prior to this hospitalization. Driving: Yes     Pain Level: Pt complained of buttocks/side pain with movement   Cognition: Pt awake and alert. Cues for safety awareness and judgement. Impulsive at times. Functional Assessment:                  AM-PAC Daily Activity Raw Score: 16/24    Initial Eval Status  Date: 3/8/2023 Treatment Status  Date: 3/13/23  Short Term Goals = Long Term Goals   Feeding Setup   Independent   Grooming Min A for hand hygiene and oral care tasks while standing at sink. CGA standing at sink     Mod I (seated/standing at sink)   UB Dressing Min A Min A   Mod I (including item retrieval)   LB Dressing Max A needed to adjust socks. Mod A   Assist to thread brief over feet. Min  A to arrange over hips.      Min A - with use of AE, as needed/appropriate   Bathing Max A   Min A - with use of AE/DME, as needed/appropriate   Toileting Min A with toilet transfer; SBA for hygiene while seated. Min A    Mod I / Independent   Bed Mobility  Supine-to-Sit: SBA  Sit-to-Supine: SBA    Independent in order to maximize patient's independence with ADLs, re-positioning, and other functional tasks. Functional Transfers Sit-to-Stand: Min A   from EOB and toilet  CGA sit <> stand from EOB, chair, and commode   Independent   Functional Mobility Min A (with walker) within patient's room and bathroom. Cues needed to maximize safety with management of walker, particularly during ADLs. Assistance needed with management of O2 line. CGA with WW in room Mod I with functional mobility (with device, as needed/appropriate) in order to maximize independence with ADLs/IADLs and other functional tasks. Balance Sitting: Good (at EOB)  Standing: Fair- (with walker) CGA static standing during ADL. Fair+ dynamic standing balance during completion of ADLs/IADLs and other functional tasks. Activity Tolerance Fair  Patient demonstrated fatigue with ~10 minutes of static/dynamic standing activities during completion of ADLs. fair Patient will demonstrate Good understanding and consistent implementation of energy conservation techniques and work simplification techniques into ADL/IADL routines. B UE Strength 3+/5   Patient will demonstrate 4/5 B UE strength in order to maximize independence with ADLs/IADLs and functional transfers     Comments:  Pt cleared with nursing and agreeable to therapy. Patient quick to fatigue but improvement demonstrated. Pt remained seated in the chair with alarm activated. Education/treatment:  ADL retraining with facilitation of movement to increase self care skills. Therapeutic activity to address balance and endurance for ADL and transfers. Pt education of transfer safety, walker safety, and daily orientation.        Pt has made  progress towards set goals.        Time In: 8:43  Time Out: 8:54      Min Units   Therapeutic Ex [id number]     Therapeutic Activities [id number]   ADL/Self Care [id number]     Orthotic Management 06242     Neuro Re-Ed [id number]     Non-Billable Time     TOTAL TIMED TREATMENT 11 Samuel Vuong 12 MARQUEZ/L 16486

## 2023-03-13 NOTE — PROGRESS NOTES
[address] [place] [address] [facility]  [facility]  Progress Note    SUBJECTIVE:  Chief Complaint   Patient presents with    Abdominal Pain    Nausea     N/v starting tonight, daughter in law reports she was fine going to bed     Patient is tolerating medications. No reported adverse drug reactions. She still feels fatigued but better than admission. No fever. Has constipation. Review of systems:  As stated above in the chief complaint, otherwise negative. Medications:  Scheduled Meds:   polyethylene glycol  17 g Oral Daily    cefdinir  300 mg Oral 2 times per day    apixaban  5 mg Oral BID    metoprolol succinate  12.5 mg Oral Daily    sodium chloride flush  10 mL IntraVENous 2 times per day    insulin lispro  0-4 Units SubCUTAneous TID WC    insulin lispro  0-4 Units SubCUTAneous Nightly    buPROPion  150 mg Oral BID    cholestyramine light  4 g Oral Daily    [Held by provider] isosorbide mononitrate  30 mg Oral Daily    levothyroxine  125 mcg Oral Daily    pantoprazole  40 mg Oral QAM AC    aspirin  81 mg Oral Daily    insulin glargine  5 Units SubCUTAneous Nightly     Continuous Infusions:   sodium chloride      dextrose       PRN Meds:perflutren lipid microspheres, sodium chloride flush, sodium chloride, potassium chloride **OR** potassium alternative oral replacement **OR** potassium chloride, ondansetron **OR** ondansetron, senna, acetaminophen **OR** acetaminophen, glucose, dextrose bolus **OR** dextrose bolus, glucagon (rDNA), dextrose    OBJECTIVE:  BP (!) 135/59   Pulse 74   Temp 97.9 °F (36.6 °C) (Oral)   Resp 18   Ht 5' 1\" (1.549 m)   Wt 212 lb 3.2 oz (96.3 kg)   SpO2 97%   BMI 40.09 kg/m²   Temp  Av.7 °F (36.5 °C)  Min: 97.2 °F (36.2 °C)  Max: 98 °F (36.7 °C)  Constitutional: The patient is sitting in bed. Skin: Warm and dry. No rashes were noted. HEENT: Round and reactive pupils. Moist mucous membranes. No ulcerations or thrush. Neck: Supple to movements.    Chest: No respiratory distress. No crackles. Cardiovascular: S1 and S2 are rhythmic and regular. No murmurs appreciated. Abdomen: Positive bowel sounds to auscultation. Pure wick in place  Extremities: No edema. Lines: Peripheral.    Laboratory and Tests Review:  Lab Results   Component Value Date    WBC 8.4 03/11/2023    WBC 8.4 03/10/2023    WBC 9.3 03/09/2023    HGB 11.1 (L) 03/11/2023    HCT 35.3 03/11/2023    MCV 98.6 03/11/2023     03/11/2023     Lab Results   Component Value Date    NEUTROABS 5.49 03/11/2023    NEUTROABS 5.88 03/10/2023    NEUTROABS 7.04 03/09/2023     No results found for: CRPHS  Lab Results   Component Value Date    ALT 15 03/11/2023    AST 26 03/11/2023    ALKPHOS 98 03/11/2023    BILITOT 0.8 03/11/2023     Lab Results   Component Value Date/Time     03/11/2023 03:30 AM    K 4.1 03/11/2023 03:30 AM     03/11/2023 03:30 AM    CO2 20 03/11/2023 03:30 AM    BUN 14 03/11/2023 03:30 AM    CREATININE 1.0 03/11/2023 03:30 AM    CREATININE 1.1 03/10/2023 05:29 AM    CREATININE 1.3 03/09/2023 03:43 AM    GFRAA 47 08/20/2021 02:50 AM    LABGLOM 55 03/11/2023 03:30 AM    GLUCOSE 139 03/11/2023 03:30 AM    GLUCOSE 122 01/27/2012 11:45 AM    PROT 5.0 03/11/2023 03:30 AM    LABALBU 2.5 03/11/2023 03:30 AM    CALCIUM 8.6 03/11/2023 03:30 AM    BILITOT 0.8 03/11/2023 03:30 AM    ALKPHOS 98 03/11/2023 03:30 AM    AST 26 03/11/2023 03:30 AM    ALT 15 03/11/2023 03:30 AM     No results found for: CRP  No results found for: [address]  Radiology:      Microbiology:   Urine culture-ecoli-pan sensitive  Blood culture ecoli      Assessment:  E.coli septicemia from UTI: Improving  Pyelonephritis: Resolving  CECY:     Plan:    Continue Cefdinir Through 3/17/2023. Med rec done  Will follow with you  DC to facility : possibly today.     Gloria Lynch MD  12:30 PM  3/13/2023

## 2023-03-13 NOTE — PROGRESS NOTES
Progress Note  Date:3/13/2023       Room:[address] [place] [address]-A  Patient Alonzo Wylie     YOB: 1935     Age:88 y.o. Patient seen for follow up of sepsis secondary to ecoli bacteremia. She is laying in bed. She denies any acute complaints. She states that she has not had a bowel movement since admission. Nursing at bedside. Subjective    Subjective:  Symptoms:  Improved. She reports weakness. No shortness of breath, cough, chest pain, headache, chest pressure or diarrhea. Diet:  No nausea or vomiting. Review of Systems   Respiratory:  Negative for cough and shortness of breath. Cardiovascular:  Negative for chest pain. Gastrointestinal:  Negative for diarrhea, nausea and vomiting. Neurological:  Positive for weakness. Objective         Vitals Last 24 Hours:  TEMPERATURE:  Temp  Av.7 °F (36.5 °C)  Min: 97.2 °F (36.2 °C)  Max: 98.2 °F (36.8 °C)  RESPIRATIONS RANGE: Resp  Av  Min: 18  Max: 18  PULSE OXIMETRY RANGE: SpO2  Av.2 %  Min: 96 %  Max: 99 %  PULSE RANGE: Pulse  Av  Min: 73  Max: 84  BLOOD PRESSURE RANGE: Systolic (75NKT), RED:066 , Min:118 , YMO:821   ; Diastolic (68MHV), OME:06, Min:57, Max:85    I/O (24Hr): Intake/Output Summary (Last 24 hours) at 3/13/2023 0549  Last data filed at 3/12/2023 1435  Gross per 24 hour   Intake 240 ml   Output 350 ml   Net -110 ml     Objective:  General Appearance:  Comfortable, in no acute distress and well-appearing. Vital signs: (most recent): Blood pressure (!) 146/68, pulse 76, temperature 97.4 °F (36.3 °C), temperature source Oral, resp. rate 18, height 5' 1\" (1.549 m), weight 212 lb 3.2 oz (96.3 kg), SpO2 97 %. Lungs:  Normal effort and normal respiratory rate. There are decreased breath sounds. No rales or rhonchi. Heart: Normal rate. Irregular rhythm. S1 normal and S2 normal.  No gallop. Abdomen: Abdomen is soft and non-distended. Bowel sounds are normal.   There is no abdominal tenderness. There is no rebound tenderness. There is no guarding. Extremities: Normal range of motion. There is no dependent edema. Skin:  Warm and dry. Labs/Imaging/Diagnostics    Labs:  CBC:  Recent Labs     03/10/23  0615 03/11/23  0330   WBC 8.4 8.4   RBC 3.57 3.58   HGB 11.1* 11.1*   HCT 35.3 35.3   MCV 98.9 98.6   RDW 14.6 14.8    218       CHEMISTRIES:  Recent Labs     03/10/23  0529 03/11/23  0330    140   K 4.1 4.1   * 112*   CO2 20* 20*   BUN 16 14   CREATININE 1.1* 1.0   GLUCOSE 130* 139*       PT/INR:No results for input(s): PROTIME, INR in the last 72 hours. APTT:No results for input(s): APTT in the last 72 hours. LIVER PROFILE:  Recent Labs     03/10/23  0529 03/11/23  0330   AST 14 26   ALT 9 15   BILITOT 0.7 0.8   ALKPHOS 87 98         Imaging Last 24 Hours:  XR HIP BILATERAL W AP PELVIS (2 VIEWS)    Result Date: 3/8/2023  EXAMINATION: ONE XRAY VIEW OF THE PELVIS AND TWO XRAY VIEWS OF EACH OF THE BILATERAL HIPS 3/8/2023 5:14 am COMPARISON: CT study done the same day HISTORY: ORDERING SYSTEM PROVIDED HISTORY: r/o fx TECHNOLOGIST PROVIDED HISTORY: Reason for exam:->r/o fx FINDINGS: Bilateral hip arthroplasty. No periprosthetic fracture is identified. Hardware appears in appropriate alignment. Degenerative change in the pubic symphysis in lumbar spine. Phleboliths are noted in the pelvis. 1. Intact bilateral total hip arthroplasties, with no periprosthetic fracture or hardware complication.      CT HEAD WO CONTRAST    Result Date: 3/8/2023  EXAMINATION: CT OF THE HEAD WITHOUT CONTRAST; CT OF THE CERVICAL SPINE WITHOUT CONTRAST 3/8/2023 5:04 am TECHNIQUE: CT of the head was performed without the administration of intravenous contrast. Automated exposure control, iterative reconstruction, and/or weight based adjustment of the mA/kV was utilized to reduce the radiation dose to as low as reasonably achievable.; CT of the cervical spine was performed without the administration of intravenous contrast. Multiplanar reformatted images are provided for review. Automated exposure control, iterative reconstruction, and/or weight based adjustment of the mA/kV was utilized to reduce the radiation dose to as low as reasonably achievable. COMPARISON: 08/21/2021 HISTORY: ORDERING SYSTEM PROVIDED HISTORY: ams TECHNOLOGIST PROVIDED HISTORY: Reason for exam:->ams Has a \"code stroke\" or \"stroke alert\" been called? ->No Decision Support Exception - unselect if not a suspected or confirmed emergency medical condition->Emergency Medical Condition (MA); ORDERING SYSTEM PROVIDED HISTORY: fall multi days ago, now weak, vomiting, cant walk TECHNOLOGIST PROVIDED HISTORY: Reason for exam:->fall multi days ago, now weak, vomiting, cant walk Decision Support Exception - unselect if not a suspected or confirmed emergency medical condition->Emergency Medical Condition (MA) FINDINGS: CT HEAD: BRAIN/VENTRICLES: There is no acute intracranial hemorrhage, mass effect or midline shift. No abnormal extra-axial fluid collection. The gray-white differentiation is maintained without evidence of an acute infarct. There is no evidence of hydrocephalus. 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 Patchy white matter low attenuation is identified within the periventricular and subcortical white matter. Age related mild generalized cortical atrophy. Intracranial atherosclerosis. ORBITS: Orbits appear unremarkable. No acute abnormality. PARANASAL SINUSES: No acute air-fluid level seen in the visualized paranasal sinuses or mastoid air cells. SOFT TISSUE/CALVARIUM: The bony calvarium appears intact without fracture. No large soft tissue hematoma is seen. CT CERVICAL SPINE: BONES/ALIGNMENT: No cervical spine fracture. Occipital condyles appear intact. C1 ring appears intact. DEGENERATIVE CHANGES: Multilevel degenerative disc disease and facet arthropathy is identified.   Posterior vertebral body alignment appears intact. Degenerative changes seen at the C1-C2 articulation. SOFT TISSUES: No apical pneumothorax. No acute subcutaneous soft tissue abnormality is seen. Carotid calcifications are identified. 1. No acute intracranial abnormality is identified. 2.  Generalized age-related cortical atrophy, with intracranial atherosclerosis and CT findings suggestive of chronic microvascular ischemic change. . 3. 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 4. Degenerative changes in the cervical spine without acute osseous fracture. CT CERVICAL SPINE WO CONTRAST    Result Date: 3/8/2023  EXAMINATION: CT OF THE HEAD WITHOUT CONTRAST; CT OF THE CERVICAL SPINE WITHOUT CONTRAST 3/8/2023 5:04 am TECHNIQUE: CT of the head was performed without the administration of intravenous contrast. Automated exposure control, iterative reconstruction, and/or weight based adjustment of the mA/kV was utilized to reduce the radiation dose to as low as reasonably achievable.; CT of the cervical spine was performed without the administration of intravenous contrast. Multiplanar reformatted images are provided for review. Automated exposure control, iterative reconstruction, and/or weight based adjustment of the mA/kV was utilized to reduce the radiation dose to as low as reasonably achievable. COMPARISON: 08/21/2021 HISTORY: ORDERING SYSTEM PROVIDED HISTORY: ams TECHNOLOGIST PROVIDED HISTORY: Reason for exam:->ams Has a \"code stroke\" or \"stroke alert\" been called? ->No Decision Support Exception - unselect if not a suspected or confirmed emergency medical condition->Emergency Medical Condition (MA); ORDERING SYSTEM PROVIDED HISTORY: fall multi days ago, now weak, vomiting, cant walk TECHNOLOGIST PROVIDED HISTORY: Reason for exam:->fall multi days ago, now weak, vomiting, cant walk Decision Support Exception - unselect if not a suspected or confirmed emergency medical condition->Emergency Medical Condition (MA) FINDINGS: CT HEAD: BRAIN/VENTRICLES: There is no acute intracranial hemorrhage, mass effect or midline shift. No abnormal extra-axial fluid collection. The gray-white differentiation is maintained without evidence of an acute infarct. There is no evidence of hydrocephalus. 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 Patchy white matter low attenuation is identified within the periventricular and subcortical white matter. Age related mild generalized cortical atrophy. Intracranial atherosclerosis. ORBITS: Orbits appear unremarkable. No acute abnormality. PARANASAL SINUSES: No acute air-fluid level seen in the visualized paranasal sinuses or mastoid air cells. SOFT TISSUE/CALVARIUM: The bony calvarium appears intact without fracture. No large soft tissue hematoma is seen. CT CERVICAL SPINE: BONES/ALIGNMENT: No cervical spine fracture. Occipital condyles appear intact. C1 ring appears intact. DEGENERATIVE CHANGES: Multilevel degenerative disc disease and facet arthropathy is identified. Posterior vertebral body alignment appears intact. Degenerative changes seen at the C1-C2 articulation. SOFT TISSUES: No apical pneumothorax. No acute subcutaneous soft tissue abnormality is seen. Carotid calcifications are identified. 1. No acute intracranial abnormality is identified. 2.  Generalized age-related cortical atrophy, with intracranial atherosclerosis and CT findings suggestive of chronic microvascular ischemic change. . 3. 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 4. Degenerative changes in the cervical spine without acute osseous fracture.      CT ABDOMEN PELVIS W IV CONTRAST Additional Contrast? None    Result Date: 3/8/2023  EXAMINATION: CT OF THE ABDOMEN AND PELVIS WITH CONTRAST 3/8/2023 5:04 am TECHNIQUE: CT of the abdomen and pelvis was performed with the administration of intravenous contrast. Multiplanar reformatted images are provided for review. Automated exposure control, iterative reconstruction, and/or weight based adjustment of the mA/kV was utilized to reduce the radiation dose to as low as reasonably achievable. COMPARISON: 01/27/2012 HISTORY: ORDERING SYSTEM PROVIDED HISTORY: febrile, n/v, ill appearing, gen abd pain TECHNOLOGIST PROVIDED HISTORY: Reason for exam:->febrile, n/v, ill appearing, gen abd pain Additional Contrast?->None Decision Support Exception - unselect if not a suspected or confirmed emergency medical condition->Emergency Medical Condition (MA) FINDINGS: Lower Chest: No cardiomegaly. Small hiatal hernia. Mitral annular calcifications. Coronary artery atherosclerosis. 4 mm rounded pulmonary nodule seen in the right lower lobe, axial image 21. Organs: Periportal low attenuation. Tiny subcapsular probable cyst in the left hepatic lobe. No splenic mass is identified. No adrenal mass. No pancreatic mass. Pancreatic calcifications related to chronic pancreatitis. No intraluminal calcified stones in the gallbladder. Nonobstructive calculus in the right kidney. No left-sided renal calculi. Limited evaluation of the pelvis and distal ureters given orthopedic hardware artifact. GI/Bowel: Colonic diverticulosis. No ileus or obstruction. Pelvis: No pelvic mass is definitively seen, though limited evaluation. Bladder is grossly unremarkable. Extensive streak artifact related to hardware from bilateral hip arthroplasty. No pelvic fracture is identified. Degenerative changes seen in the sacroiliac joints. Peritoneum/Retroperitoneum: Aorto iliac atherosclerosis. No retroperitoneal or mesenteric lymphadenopathy. Small lymph nodes are noted. No bowel herniation. Bones/Soft Tissues: No acute subcutaneous soft tissue abnormality. No acute osseous abnormality. Moderate degenerative changes seen throughout the lower thoracic and lumbar spine. Posterior vertebral body alignment appears intact. 1. Periportal low attenuation is seen within the liver with mild heterogeneous enhancement. Correlate with liver enzymes as hepatitis could have this appearance. 2. Nonobstructive right renal calculi. No significant hydroureteronephrosis is identified. Mild stranding seen adjacent to the ureters. Correlate for possible ascending urinary tract infection. Bladder not well assessed given pelvic artifact. 3. Small hiatal hernia. 4. Atherosclerosis including coronary artery involvement. 5. There is a 4 mm pulmonary nodule in the right lower lobe. No follow-up imaging recommended. Reference below. 6. Other nonacute incidental findings as above. RECOMMENDATIONS: 4 mm right solid pulmonary nodule. No routine follow-up imaging is recommended per Fleischner Society Guidelines. These guidelines do not apply to immunocompromised patients and patients with cancer. Follow up in patients with significant comorbidities as clinically warranted. For lung cancer screening, adhere to Lung-RADS guidelines. Reference: Radiology. 2017; 284(1):228-43. XR CHEST PORTABLE    Result Date: 3/8/2023  EXAMINATION: ONE XRAY VIEW OF THE CHEST 3/8/2023 5:14 am COMPARISON: 07/14/2016 HISTORY: ORDERING SYSTEM PROVIDED HISTORY: eval pna TECHNOLOGIST PROVIDED HISTORY: Reason for exam:->eval pna FINDINGS: Cardiac and mediastinal silhouettes appear similar. Elevated right hemidiaphragm. Aortic atherosclerosis. No focal infiltrate. No pleural effusion. No pneumothorax. No acute osseous abnormality. Degenerative changes are seen throughout the spine. Central bronchial thickening. Aortic atherosclerosis. Central bronchial thickening which may be related to peribronchial edema or inflammatory change.      Assessment//Plan           Hospital Problems             Last Modified POA    * (Principal) Pyelonephritis 3/8/2023 Yes   Assessment & Plan  Sepsis secondary to complicated UTI with ecoli bacteremia. Afib   Fall   Gait dysfunction  Type II diabetes mellitus Hba1c 7.0 on 2/21/23. Echocardiogram reviewed. Appreciate Cardiology and infectious disease input. Continue on cefdinir per ID. Continue on metoprolol and eliquis. Add miralax. Lost precert yesterday. Should be stable for discharge once precert once re-obtained.      Gill Lopez, DO

## 2023-03-13 NOTE — DISCHARGE SUMMARY
Discharge Summary     Patient ID:  Yunior Flores  [id number]  82 y.o. 1935 female  Emily Lozano DO Jessica        Admit date: 3/8/2023    Discharge date and time:  3/13/2023  5:11 PM      Activity level: As tolerated  Diet: Diabetic  Labs: None   Disposition:MARLINE at [facility]  Condition on Discharge: Stable  DME:None     Admit Diagnoses:   Patient Active Problem List   Diagnosis    Primary osteoarthritis of right hip    Gout    Depression    Hyperlipidemia    HBP (high blood pressure)    Hypothyroid    H/O total hip arthroplasty (11-21-13: [facility])(Esequiel Lomeli MD)    CAD (coronary artery disease)    Primary osteoarthritis of right knee    S/P R total knee arthroplasty (2-19-15: Dr. Scott Lundberg)    Primary osteoarthritis of left knee    S/P L total knee arthroplasty (7-19-16: Nessa Velásquez M.D.)    Diabetes mellitus [facility])    Primary osteoarthritis of left hip    Pyelonephritis       Discharge Diagnoses: Principal Problem:    Pyelonephritis  Resolved Problems:    * No resolved hospital problems. *      Consults:  IP CONSULT TO INTERNAL MEDICINE  IP CONSULT TO SOCIAL WORK  IP CONSULT TO CARDIOLOGY  IP CONSULT TO INFECTIOUS DISEASES  IP CONSULT TO IV TEAM  IP CONSULT TO CARDIOLOGY    Procedures: None     Hospital Course: Patient is an 82year old female who presented to [address] [place] [address] due to Abdominal pain, nausea and vomiting. Patient states she was not feeling well today and her daughter in law forced her to come to the ER. She was not able to give much of the history but per reports she was having vomiting around 230 and unable to ambulate. She had fallen in her garage 5 days prior to starting to get sick. She denies any lightheadedness or dizziness. She did not black out and did not hit her head when she fell. Patient found to have pyelonephritis and ecoli bacteremia. Patient was started on ceftriaxone and transitioned to oral cefdinir. Patient had metoprolol and lisinopril.  Patient went into afib with rvr which is new. She was seen by cardiology and restarted on low dose metoprolol after digoxin load due to hypotension. BP improved. She was found to be weak and discharged to rehab. Discharge Exam:  General Appearance:  Comfortable, in no acute distress and well-appearing. Vital signs: (most recent): Blood pressure (!) 153/85, pulse 73, temperature 97.4 °F (36.3 °C), temperature source Oral, resp. rate 18, height 5' 1\" (1.549 m), weight 212 lb 3.2 oz (96.3 kg), SpO2 99 %. Lungs:  Normal effort and normal respiratory rate. There are decreased breath sounds. No rales or rhonchi. Heart: Regular rate  Irregular rhythm. S1 normal and S2 normal.  No gallop. Abdomen: Abdomen is soft and non-distended. Bowel sounds are normal.   There is no abdominal tenderness. There is no rebound tenderness. There is no guarding. Extremities: Normal range of motion. There is no dependent edema. Skin:  Warm and dry. I/O last 3 completed shifts: In: 240 [P.O.:240]  Out: 750 [Urine:750]  I/O this shift:  In: 180 [P.O.:180]  Out: -       LABS:  Recent Labs     03/11/23  0330      K 4.1   *   CO2 20*   BUN 14   CREATININE 1.0   GLUCOSE 139*   CALCIUM 8.6       Recent Labs     03/11/23  0330   WBC 8.4   RBC 3.58   HGB 11.1*   HCT 35.3   MCV 98.6   MCH 31.0   MCHC 31.4*   RDW 14.8      MPV 10.9       Recent Labs     03/13/23  1546   GLUMET 119*       Imaging:  XR HIP BILATERAL W AP PELVIS (2 VIEWS)    Result Date: 3/8/2023  EXAMINATION: ONE XRAY VIEW OF THE PELVIS AND TWO XRAY VIEWS OF EACH OF THE BILATERAL HIPS 3/8/2023 5:14 am COMPARISON: CT study done the same day HISTORY: ORDERING SYSTEM PROVIDED HISTORY: r/o fx TECHNOLOGIST PROVIDED HISTORY: Reason for exam:->r/o fx FINDINGS: Bilateral hip arthroplasty. No periprosthetic fracture is identified. Hardware appears in appropriate alignment. Degenerative change in the pubic symphysis in lumbar spine.   Phleboliths are noted in the pelvis. 1. Intact bilateral total hip arthroplasties, with no periprosthetic fracture or hardware complication. CT HEAD WO CONTRAST    Result Date: 3/8/2023  EXAMINATION: CT OF THE HEAD WITHOUT CONTRAST; CT OF THE CERVICAL SPINE WITHOUT CONTRAST 3/8/2023 5:04 am TECHNIQUE: CT of the head was performed without the administration of intravenous contrast. Automated exposure control, iterative reconstruction, and/or weight based adjustment of the mA/kV was utilized to reduce the radiation dose to as low as reasonably achievable.; CT of the cervical spine was performed without the administration of intravenous contrast. Multiplanar reformatted images are provided for review. Automated exposure control, iterative reconstruction, and/or weight based adjustment of the mA/kV was utilized to reduce the radiation dose to as low as reasonably achievable. COMPARISON: 08/21/2021 HISTORY: ORDERING SYSTEM PROVIDED HISTORY: ams TECHNOLOGIST PROVIDED HISTORY: Reason for exam:->ams Has a \"code stroke\" or \"stroke alert\" been called? ->No Decision Support Exception - unselect if not a suspected or confirmed emergency medical condition->Emergency Medical Condition (MA); ORDERING SYSTEM PROVIDED HISTORY: fall multi days ago, now weak, vomiting, cant walk TECHNOLOGIST PROVIDED HISTORY: Reason for exam:->fall multi days ago, now weak, vomiting, cant walk Decision Support Exception - unselect if not a suspected or confirmed emergency medical condition->Emergency Medical Condition (MA) FINDINGS: CT HEAD: BRAIN/VENTRICLES: There is no acute intracranial hemorrhage, mass effect or midline shift. No abnormal extra-axial fluid collection. The gray-white differentiation is maintained without evidence of an acute infarct. There is no evidence of hydrocephalus. 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   Patchy white matter low attenuation is identified within the periventricular and subcortical white matter. Age related mild generalized cortical atrophy. Intracranial atherosclerosis. ORBITS: Orbits appear unremarkable. No acute abnormality. PARANASAL SINUSES: No acute air-fluid level seen in the visualized paranasal sinuses or mastoid air cells. SOFT TISSUE/CALVARIUM: The bony calvarium appears intact without fracture. No large soft tissue hematoma is seen. CT CERVICAL SPINE: BONES/ALIGNMENT: No cervical spine fracture. Occipital condyles appear intact. C1 ring appears intact. DEGENERATIVE CHANGES: Multilevel degenerative disc disease and facet arthropathy is identified. Posterior vertebral body alignment appears intact. Degenerative changes seen at the C1-C2 articulation. SOFT TISSUES: No apical pneumothorax. No acute subcutaneous soft tissue abnormality is seen. Carotid calcifications are identified. 1. No acute intracranial abnormality is identified. 2.  Generalized age-related cortical atrophy, with intracranial atherosclerosis and CT findings suggestive of chronic microvascular ischemic change. . 3. 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 4. Degenerative changes in the cervical spine without acute osseous fracture. CT CERVICAL SPINE WO CONTRAST    Result Date: 3/8/2023  EXAMINATION: CT OF THE HEAD WITHOUT CONTRAST; CT OF THE CERVICAL SPINE WITHOUT CONTRAST 3/8/2023 5:04 am TECHNIQUE: CT of the head was performed without the administration of intravenous contrast. Automated exposure control, iterative reconstruction, and/or weight based adjustment of the mA/kV was utilized to reduce the radiation dose to as low as reasonably achievable.; CT of the cervical spine was performed without the administration of intravenous contrast. Multiplanar reformatted images are provided for review.  Automated exposure control, iterative reconstruction, and/or weight based adjustment of the mA/kV was utilized to reduce the radiation dose to as low as reasonably achievable. COMPARISON: 08/21/2021 HISTORY: ORDERING SYSTEM PROVIDED HISTORY: ams TECHNOLOGIST PROVIDED HISTORY: Reason for exam:->ams Has a \"code stroke\" or \"stroke alert\" been called? ->No Decision Support Exception - unselect if not a suspected or confirmed emergency medical condition->Emergency Medical Condition (MA); ORDERING SYSTEM PROVIDED HISTORY: fall multi days ago, now weak, vomiting, cant walk TECHNOLOGIST PROVIDED HISTORY: Reason for exam:->fall multi days ago, now weak, vomiting, cant walk Decision Support Exception - unselect if not a suspected or confirmed emergency medical condition->Emergency Medical Condition (MA) FINDINGS: CT HEAD: BRAIN/VENTRICLES: There is no acute intracranial hemorrhage, mass effect or midline shift. No abnormal extra-axial fluid collection. The gray-white differentiation is maintained without evidence of an acute infarct. There is no evidence of hydrocephalus. 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 Patchy white matter low attenuation is identified within the periventricular and subcortical white matter. Age related mild generalized cortical atrophy. Intracranial atherosclerosis. ORBITS: Orbits appear unremarkable. No acute abnormality. PARANASAL SINUSES: No acute air-fluid level seen in the visualized paranasal sinuses or mastoid air cells. SOFT TISSUE/CALVARIUM: The bony calvarium appears intact without fracture. No large soft tissue hematoma is seen. CT CERVICAL SPINE: BONES/ALIGNMENT: No cervical spine fracture. Occipital condyles appear intact. C1 ring appears intact. DEGENERATIVE CHANGES: Multilevel degenerative disc disease and facet arthropathy is identified. Posterior vertebral body alignment appears intact. Degenerative changes seen at the C1-C2 articulation. SOFT TISSUES: No apical pneumothorax.   No acute subcutaneous soft tissue abnormality is seen. Carotid calcifications are identified. 1. No acute intracranial abnormality is identified. 2.  Generalized age-related cortical atrophy, with intracranial atherosclerosis and CT findings suggestive of chronic microvascular ischemic change. . 3. 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 4. Degenerative changes in the cervical spine without acute osseous fracture. CT ABDOMEN PELVIS W IV CONTRAST Additional Contrast? None    Result Date: 3/8/2023  EXAMINATION: CT OF THE ABDOMEN AND PELVIS WITH CONTRAST 3/8/2023 5:04 am TECHNIQUE: CT of the abdomen and pelvis was performed with the administration of intravenous contrast. Multiplanar reformatted images are provided for review. Automated exposure control, iterative reconstruction, and/or weight based adjustment of the mA/kV was utilized to reduce the radiation dose to as low as reasonably achievable. COMPARISON: 01/27/2012 HISTORY: ORDERING SYSTEM PROVIDED HISTORY: febrile, n/v, ill appearing, gen abd pain TECHNOLOGIST PROVIDED HISTORY: Reason for exam:->febrile, n/v, ill appearing, gen abd pain Additional Contrast?->None Decision Support Exception - unselect if not a suspected or confirmed emergency medical condition->Emergency Medical Condition (MA) FINDINGS: Lower Chest: No cardiomegaly. Small hiatal hernia. Mitral annular calcifications. Coronary artery atherosclerosis. 4 mm rounded pulmonary nodule seen in the right lower lobe, axial image 21. Organs: Periportal low attenuation. Tiny subcapsular probable cyst in the left hepatic lobe. No splenic mass is identified. No adrenal mass. No pancreatic mass. Pancreatic calcifications related to chronic pancreatitis. No intraluminal calcified stones in the gallbladder. Nonobstructive calculus in the right kidney. No left-sided renal calculi.   Limited evaluation of the pelvis and distal ureters given orthopedic hardware artifact. GI/Bowel: Colonic diverticulosis. No ileus or obstruction. Pelvis: No pelvic mass is definitively seen, though limited evaluation. Bladder is grossly unremarkable. Extensive streak artifact related to hardware from bilateral hip arthroplasty. No pelvic fracture is identified. Degenerative changes seen in the sacroiliac joints. Peritoneum/Retroperitoneum: Aorto iliac atherosclerosis. No retroperitoneal or mesenteric lymphadenopathy. Small lymph nodes are noted. No bowel herniation. Bones/Soft Tissues: No acute subcutaneous soft tissue abnormality. No acute osseous abnormality. Moderate degenerative changes seen throughout the lower thoracic and lumbar spine. Posterior vertebral body alignment appears intact. 1. Periportal low attenuation is seen within the liver with mild heterogeneous enhancement. Correlate with liver enzymes as hepatitis could have this appearance. 2. Nonobstructive right renal calculi. No significant hydroureteronephrosis is identified. Mild stranding seen adjacent to the ureters. Correlate for possible ascending urinary tract infection. Bladder not well assessed given pelvic artifact. 3. Small hiatal hernia. 4. Atherosclerosis including coronary artery involvement. 5. There is a 4 mm pulmonary nodule in the right lower lobe. No follow-up imaging recommended. Reference below. 6. Other nonacute incidental findings as above. RECOMMENDATIONS: 4 mm right solid pulmonary nodule. No routine follow-up imaging is recommended per Fleischner Society Guidelines. These guidelines do not apply to immunocompromised patients and patients with cancer. Follow up in patients with significant comorbidities as clinically warranted. For lung cancer screening, adhere to Lung-RADS guidelines. Reference: Radiology. 2017; 284(1):228-43.      XR CHEST PORTABLE    Result Date: 3/8/2023  EXAMINATION: ONE XRAY VIEW OF THE CHEST 3/8/2023 5:14 am COMPARISON: 07/14/2016 HISTORY: ORDERING SYSTEM PROVIDED HISTORY: eval pna TECHNOLOGIST PROVIDED HISTORY: Reason for exam:->eval pna FINDINGS: Cardiac and mediastinal silhouettes appear similar. Elevated right hemidiaphragm. Aortic atherosclerosis. No focal infiltrate. No pleural effusion. No pneumothorax. No acute osseous abnormality. Degenerative changes are seen throughout the spine. Central bronchial thickening. Aortic atherosclerosis. Central bronchial thickening which may be related to peribronchial edema or inflammatory change. Patient Instructions:   Current Discharge Medication List        START taking these medications    Details   insulin detemir (LEVEMIR) 100 UNIT/ML injection vial Inject 5 Units into the skin nightly  Qty: 1 mL, Refills: 0      metoprolol succinate (TOPROL XL) 25 MG extended release tablet Take 0.5 tablets by mouth daily  Qty: 30 tablet, Refills: 3      polyethylene glycol (GLYCOLAX) 17 g packet Take 17 g by mouth daily  Qty: 527 g, Refills: 1      cefdinir (OMNICEF) 300 MG capsule Take 1 capsule by mouth every 12 hours for 6 days  Qty: 12 capsule, Refills: 0           CONTINUE these medications which have CHANGED    Details   apixaban (ELIQUIS) 5 MG TABS tablet Take 1 tablet by mouth 2 times daily  Qty: 60 tablet, Refills: 0           CONTINUE these medications which have NOT CHANGED    Details   atorvastatin (LIPITOR) 20 MG tablet Take 20 mg by mouth daily      levothyroxine (SYNTHROID) 125 MCG tablet Take 125 mcg by mouth Daily      aspirin 325 MG tablet Take 325 mg by mouth daily Last dose 8-12-21      omeprazole (PRILOSEC) 20 MG delayed release capsule Take 20 mg by mouth daily Instructed to take morning of surgery with a sip of water      ACCU-CHEK JOELLE PLUS strip       lidocaine (LIDODERM) 5 % Place 1 patch onto the skin daily 12 hours on, 12 hours off.   Qty: 30 patch, Refills: 0      cholestyramine light 4 G packet Take 4 g by mouth daily       buPROPion SR (WELLBUTRIN SR) 150 MG SR tablet Take 150 mg by mouth 2 times daily Instructed to take morning of surgery with a sip of water      Potassium Citrate (UROCIT-K 10 PO) Take 2 tablets by mouth 2 times daily       allopurinol (ZYLOPRIM) 300 MG tablet Take 300 mg by mouth daily.  Taking for kidney stones      Cholecalciferol (VITAMIN D3) 2000 UNITS CAPS Take 2,000 Units by mouth daily Last dose 08/12/21      multivitamin-iron-minerals-folic acid (CENTRUM) chewable tablet Take 1 tablet by mouth daily Last dose 8-12-21           STOP taking these medications       isosorbide mononitrate (IMDUR) 30 MG CR tablet Comments:   Reason for Stopping:         lisinopril (PRINIVIL;ZESTRIL) 10 MG tablet Comments:   Reason for Stopping:         Metoprolol Tartrate (LOPRESSOR PO) Comments:   Reason for Stopping:                 Note that more than 30 minutes was spent in preparing discharge papers, discussing discharge with patient, medication review, etc.      Signed:    Hema Orozco DO    Electronically signed by Hema Orozco DO on 3/13/2023 at 5:11 PM

## 2023-03-13 NOTE — PROGRESS NOTES
Physical Therapy  Facility/Department: [address] INTERNAL MEDICINE 2  Daily Treatment Note  NAME: Eddie Crane  : 1935  MRN: [id number]    Date of Service: 3/13/2023    Patient Diagnosis(es): The primary encounter diagnosis was Pyelonephritis. Diagnoses of Nausea and vomiting, unspecified vomiting type, Dehydration, Septicemia ([address] CECY (acute kidney injury) ([address] and Acute hypoxemic respiratory failure ([address] were also pertinent to this visit. Evaluating Therapist: Emma Dominguez PT        Room #:  4623/7471-U  Diagnosis:  Dehydration [E86.0]  Pyelonephritis [N12]  Septicemia ([address] [A41.9]  CECY (acute kidney injury) ([address] [N17.9]  Acute hypoxemic respiratory failure (HCC) [J96.01]  Nausea and vomiting, unspecified vomiting type [R11.2]  PMHx/PSHx:  CAD, DM  Precautions:  falls, O2     Social:  Pt lives alone in a 1 floor plan with laundry in basement. Independent with ww. States she does everything at home on her own, cooking, laundry, cleaning, driving, groceries. Reports falls at times at home. States since her knee surgery sometimes her legs just give out on her. Initial Evaluation  Date: 3/8/23 Treatment     3/13/2023 Short Term/ Long Term   Goals   Was pt agreeable to Eval/treatment? yes  yes     Does pt have pain?  No c/o pain  no c/o pain     Bed Mobility  Rolling: SBA  Supine to sit: SBA  Sit to supine: SBA  Scooting: SBA  rolling:  NT  Supine to sit:  NT  Sit to supine:  NT  Scooting:  NT independent   Transfers Sit to stand: CGA  Stand to sit: CGA  Stand pivot: CGA  sit to stand:  CGA  Stand to sit:  CGA  Stand pivot:  NT independent   Ambulation    25 feet x2 with ww with CGA  15 feet and 35 feet with w/w for balance  feet with ww independent   Stair Negotiation  Ascended and descended  NT  NT  4-12 steps with 1 rail independent   LE strength     3+/5     4/5   balance      Fair with ww  sitting EOB:  independent  Standing with w/w:  Min A     AM-PAC Raw score 16/24 16/24          Pt is alert and able to follow instruction  Balance: fair minus dynamic using [address] for support    Pt performed therapeutic exercise of the following: NT    Patient education/treatment  Pt was educated on UE usage to assist with transfer safety    Patient response to education:   Pt verbalized understanding Pt demonstrated skill Pt requires further education in this area   yes With instruction yes     ASSESSMENT:   Comments: Pt found sitting EOB SBA for balance. Gait slow and consistent. Pt displayed mild unsteadiness, required light hands on assist for balance and safety. Pt fatigued after activity, states LEs weak. Pt was left in a bedside chair with call light in reach, waffle cushion in place. Chair/bed alarm: chair alarm active    Time in 0843   Time out 0855   Total Treatment Time 12 minutes   CPT codes:     Therapeutic activities 55109 12 minutes   Therapeutic exercises 44157 0 minutes       Pt is making fair progress toward established Physical Therapy goals, balance improved this session. Continue with physical therapy current plan of care.     Dhruv Hernadez PTA   License Number: [id number]

## 2023-03-14 ENCOUNTER — TELEPHONE (OUTPATIENT)
Dept: ADMINISTRATIVE | Age: 88
End: 2023-03-14

## 2023-03-14 NOTE — TELEPHONE ENCOUNTER
Jody/YARIEL Olivas called to schedule hospital follow up w/cardiology; Dr Merle Maciel consult 3/9/23; discharged to facility;  please call Liz Gill w/appt at [phone] x [phone]

## 2023-03-14 NOTE — ADT AUTH CERT
Utilization Reviews       Sepsis and Other Febrile Illness, without Focal Infection - Care Day 5 (3/12/2023) by Ryanne Bhandari RN       Review Status Review Entered   Completed 3/13/2023 1701       Created By   Ryanne Bhandari RN      Criteria Review      Care Day: 5 Care Date: 3/12/2023 Level of Care: Intermediate Care    Guideline Day 3    Level Of Care    (X) Floor    3/13/2023 4:58 PM EDT by Anson Oppenheim      medical intermediate care    Clinical Status    (X) * Mental status at baseline    3/13/2023 4:58 PM EDT by Anson Oppenheim      a&o    (X) * Afebrile or fever improved    3/13/2023 4:58 PM EDT by Anson Oppenheim      temp 98.2    Activity    ( ) Activity as tolerated    3/13/2023 4:58 PM EDT by Anson Oppenheim      up as tolerated    Routes    (X) * Oral hydration    3/13/2023 4:58 PM EDT by Anson Oppenheim      po hydration    (X) Diet as tolerated    3/13/2023 4:58 PM EDT by Anson Oppenheim      adult regular diet    Medications    (X) Possible antimicrobial treatment    3/13/2023 5:01 PM EDT by Elliot 74, [address]    3/13/2023 4:58 PM EDT by Anson Oppenheim      OMNICEF 300MG BID PO  rocephin    (X) Possible DVT prophylaxis    3/13/2023 4:58 PM EDT by Anson Oppenheim      apixaban 5mg bid po   aspirin 81mg qd po  lovenox 90mg bid sc    * Milestone   Additional Notes   DATE: GRACIE 3/12/23         PERTINENT UPDATES:   Unstable blood pressure         VITALS:   RR 18   ID 84   /57 156/70. SPO2 96 RA         ABNL/PERTINENT LABS/RADIOLOGY/DIAGNOSTIC STUDIES:   Meter Glucose: 124 (H) 162 (H) 136 (H) 138 (H)      PHYSICAL EXAM:   Heart: AF with CVR. , no murmurs, gallops, or rubs. MD CONSULTS/ASSESSMENT AND PLAN:   *IM   Assessment:    Plan:   1. AF- presenting with RVR. HR improved on low dose of metoprolol. On eliquis   2. UTI/pyelonephritis/E.cli sepsis- on cefdinir   3. DM- controlled   4. HTN- controlled.  Previous hypotension resolved *INFECTIOUS DISEASE    Assessment:   E.coli septicemia from UTI: Improving   Pyelonephritis: Resolving   CECY:       Plan:     Continue Cefdinir Through 3/17/2023   Staph aureus showed up under blood culture-spoke with micro who reports that is transcription error and report will be adjusted. Will follow with you   DC to facility when ready                     Sepsis and Other Febrile Illness, without Focal Infection - Care Day 4 (3/11/2023) by Kyung Hayward RN       Review Status Review Entered   Completed 3/13/2023 1654       Created By   Kyung Hayward RN      Criteria Review      Care Day: 4 Care Date: 3/11/2023 Level of Care: Intermediate Care    Guideline Day 3    Level Of Care    ( ) Floor    3/13/2023 4:41 PM EDT by Moris Salguero      medical intermediate care    Clinical Status    (X) * Mental status at baseline    3/13/2023 4:41 PM EDT by Moris Salguero      a&o    (X) * Afebrile or fever improved    3/13/2023 4:41 PM EDT by Moris Salguero      temp 98.1 (36.7)    Activity    ( ) Activity as tolerated    3/13/2023 4:41 PM EDT by Kathy Hernandez as tolerated    Routes    ( ) * Oral hydration    3/13/2023 4:41 PM EDT by Moris Salguero      0.9nacl 75ml/hr cont iv, po hydration    (X) Diet as tolerated    3/13/2023 4:41 PM EDT by Moris Salguero      ADULT DIET;  Regular; 4 carb choices (60 gm/meal)    (X) Parenteral or oral medications    3/13/2023 4:41 PM EDT by Moris Salguero      tylenol 650mg q6h prn po x 1    Interventions    (X) WBC    3/13/2023 4:41 PM EDT by Moris Salguero      WBC: 8.4    Medications    (X) Possible antimicrobial treatment    3/13/2023 4:54 PM EDT by Moris Salguero      OMNICEF 300MG BID PO  rocephin 1g q24h iv    (X) Possible DVT prophylaxis    3/13/2023 4:41 PM EDT by Moris Salguero      apixaban 5mg bid po   aspirin 81mg qd po  lovenox 90mg bid sc    * Milestone   Additional Notes   DATE: GRACIE 3/11/23         PERTINENT UPDATES: Still on IV fluid      VITALS:   rr 16-18   pr 84   bp 125/55   spo2 95ra       ABNL/PERTINENT LABS/RADIOLOGY/DIAGNOSTIC STUDIES:   Chloride: 112 (H)   CO2: 20 (L)   Glucose, Random: 139 (H)   Total Protein: 5.0 (L)   Albumin: 2.5 (L)   Hemoglobin Quant: 11.1 (L)      Meter Glucose: 133 (H) 151 (H) 124 (H) 184 (H)         PHYSICAL EXAM:   CARDIOVASCULAR:      Heart Palpation - no palpable thrills     Heart Ausculation - Irregularly irregular rate and rhythm, 2/6 systolic murmur, No s3 or rub. No lower extremity edema,  Distal pulses palpable, no c cyanosis          MD CONSULTS/ASSESSMENT AND PLAN:   *IM   Assessment:   Plan:   1. E. Coli septicemia/complicated UTI- on cefriaxone. No fever or chills. No leukocytosis. ID on case   2. AF with RVR- HR controlled on low dose metoprolol. She is on lovenox. Will defer choice of [facility] [address] to cardiology, Normal EF without significant valvular disease on echo   3. DM- controlled   4. HTN- controlled. Previous hypotension appears resolved. Remains off lisinopril         *INFECTIOUS DISEASE   Assessment:   E.coli septicemia from UTI: Improving   Pyelonephritis: Resolving   CECY:       Plan:     Cont IV ceftriaxone 1g--switch her to cefdinir through 3/17/2023   Monitor labs   Will follow cx and adjust antibiotics   Staph aureus showed up under blood culture-spoke with micro who reports that is transcription error and report will be adjusted. Will follow with you   DC to facility when ready          *CARDIOLOGY   ASSESSMENT/PLAN:       Pyelonephritis - Per Dr Dominique PATEL Fib  - New, High [id number] VASc score - Rate control with low dose BB -If needed add Digoxin 0.125mg; On Lovenox-transition to to be -risk benefits discussed, agreeable for Eliquis 5mg BID       VHD - Mild MR, TR       Hx of CAD       Essential HTN       Morbid obesity - BMI 40.09 - Diet, exercise as tolerates and weight loss discussed.          BP an HR stable, Cardiology will sign off - please call if needed

## 2023-09-28 ENCOUNTER — OFFICE VISIT (OUTPATIENT)
Dept: CARDIOLOGY CLINIC | Age: 88
End: 2023-09-28
Payer: MEDICARE

## 2023-09-28 VITALS
HEART RATE: 87 BPM | BODY MASS INDEX: 36.47 KG/M2 | SYSTOLIC BLOOD PRESSURE: 118 MMHG | HEIGHT: 62 IN | DIASTOLIC BLOOD PRESSURE: 74 MMHG | RESPIRATION RATE: 18 BRPM | WEIGHT: 198.2 LBS

## 2023-09-28 DIAGNOSIS — N12 PYELONEPHRITIS: ICD-10-CM

## 2023-09-28 DIAGNOSIS — I38 VALVULAR HEART DISEASE: ICD-10-CM

## 2023-09-28 DIAGNOSIS — I25.10 CORONARY ARTERY DISEASE INVOLVING NATIVE CORONARY ARTERY WITHOUT ANGINA PECTORIS, UNSPECIFIED WHETHER NATIVE OR TRANSPLANTED HEART: ICD-10-CM

## 2023-09-28 DIAGNOSIS — E03.2 HYPOTHYROIDISM DUE TO MEDICATION: ICD-10-CM

## 2023-09-28 DIAGNOSIS — E78.2 MIXED HYPERLIPIDEMIA: ICD-10-CM

## 2023-09-28 DIAGNOSIS — M16.11 PRIMARY OSTEOARTHRITIS OF RIGHT HIP: Chronic | ICD-10-CM

## 2023-09-28 DIAGNOSIS — I48.91 ATRIAL FIBRILLATION, UNSPECIFIED TYPE (HCC): Primary | ICD-10-CM

## 2023-09-28 DIAGNOSIS — E08.00 DIABETES MELLITUS DUE TO UNDERLYING CONDITION WITH HYPEROSMOLARITY WITHOUT COMA, WITHOUT LONG-TERM CURRENT USE OF INSULIN (HCC): ICD-10-CM

## 2023-09-28 DIAGNOSIS — Z96.643 STATUS POST TOTAL REPLACEMENT OF BOTH HIPS: ICD-10-CM

## 2023-09-28 PROCEDURE — 93000 ELECTROCARDIOGRAM COMPLETE: CPT | Performed by: INTERNAL MEDICINE

## 2023-09-28 PROCEDURE — 1123F ACP DISCUSS/DSCN MKR DOCD: CPT | Performed by: INTERNAL MEDICINE

## 2023-09-28 PROCEDURE — 99214 OFFICE O/P EST MOD 30 MIN: CPT | Performed by: INTERNAL MEDICINE

## 2023-09-28 RX ORDER — GLIPIZIDE 2.5 MG/1
TABLET, EXTENDED RELEASE ORAL
COMMUNITY
Start: 2023-07-20

## 2023-09-28 RX ORDER — CEFDINIR 300 MG/1
CAPSULE ORAL
COMMUNITY
Start: 2023-09-14

## 2023-09-28 RX ORDER — LISINOPRIL 10 MG/1
TABLET ORAL
COMMUNITY
Start: 2023-09-22

## 2023-09-28 NOTE — PROGRESS NOTES
Out PATIENT New CARDIOLOGY CONSULT    Name: Morena Sanchez    Age: 82 y.o. Date of Admission: No admission date for patient encounter. Date of Service: 9/29/2023    Reason for Consultation:   Chief Complaint   Patient presents with    Follow-Up from Hospital    Atrial Fibrillation          Referring Physician: No admitting provider for patient encounter. History of Present Illness: 74-year-old female with below history who was recently hospitalized for pyelonephritis with E. coli and found to be in new onset A-fib with RVR. She underwent echo which revealed normal EF noted to be 60 to 65% and the patient did not want ischemic evaluation. She present for follow-up visit today and report doing well and denies any symptoms despite being in A-fib. Her A-fib rate is controlled. Past Medical History:    CAD:  Lexiscan 7/2016: Normal imaging. No ischemia. [facility] 1997 and 1998: Further details unknown. Patient reports she does not know why she had heart cath done but she had no stents.   Hypertension  Hyperlipidemia  IDDM  Hypothyroidism  GERD  History of blood clots>> patient denies history of blood clots  History of nephrolithiasis  Vitamin D deficiency  Arthritis  Use of cane  Depression    Past Medical History:  Past Medical History:   Diagnosis Date    Arthritis     osteo    CAD (coronary artery disease) 1997    follows with PCP    Depression     Diabetes mellitus ([address])     insulin dependent    H/O cardiovascular stress test 06/29/2016    report on chart    Heartburn     History of kidney stones     Hx of blood clots 1973    h/o    Hyperlipidemia     Hypertension     Left hip pain 05/2021    Myocardial infarct ([address]) 1997    3 mild episodes    Thyroid disease     Use of cane as ambulatory aid     Vitamin D deficiency        Past Surgical History:  Past Surgical History:   Procedure Laterality Date    ARTHROGRAPHY Left 5/20/2021    LEFT HIP INJECTION ARTHROGRAM performed by Rohith Young MD at

## 2023-09-29 PROBLEM — I38 VALVULAR HEART DISEASE: Status: ACTIVE | Noted: 2023-09-29

## 2024-09-09 ENCOUNTER — APPOINTMENT (OUTPATIENT)
Dept: CT IMAGING | Age: 89
End: 2024-09-09
Payer: MEDICARE

## 2024-09-09 ENCOUNTER — HOSPITAL ENCOUNTER (EMERGENCY)
Age: 89
Discharge: HOME | End: 2024-09-10
Attending: EMERGENCY MEDICINE
Payer: MEDICARE

## 2024-09-09 VITALS
TEMPERATURE: 97.6 F | DIASTOLIC BLOOD PRESSURE: 77 MMHG | RESPIRATION RATE: 18 BRPM | WEIGHT: 198 LBS | HEART RATE: 74 BPM | BODY MASS INDEX: 36.44 KG/M2 | HEIGHT: 62 IN | SYSTOLIC BLOOD PRESSURE: 166 MMHG | OXYGEN SATURATION: 99 %

## 2024-09-09 DIAGNOSIS — N30.00 ACUTE CYSTITIS WITHOUT HEMATURIA: ICD-10-CM

## 2024-09-09 DIAGNOSIS — N13.30 HYDRONEPHROSIS, UNSPECIFIED HYDRONEPHROSIS TYPE: Primary | ICD-10-CM

## 2024-09-09 DIAGNOSIS — N13.4 HYDROURETER: ICD-10-CM

## 2024-09-09 LAB
ALBUMIN SERPL-MCNC: 4.5 G/DL (ref 3.5–5.2)
ALP SERPL-CCNC: 85 U/L (ref 35–104)
ALT SERPL-CCNC: 12 U/L (ref 0–32)
ANION GAP SERPL CALCULATED.3IONS-SCNC: 12 MMOL/L (ref 7–16)
AST SERPL-CCNC: 19 U/L (ref 0–31)
BACTERIA URNS QL MICRO: ABNORMAL
BASOPHILS # BLD: 0.05 K/UL (ref 0–0.2)
BASOPHILS NFR BLD: 0 % (ref 0–2)
BILIRUB DIRECT SERPL-MCNC: 0.3 MG/DL (ref 0–0.3)
BILIRUB INDIRECT SERPL-MCNC: 0.5 MG/DL (ref 0–1)
BILIRUB SERPL-MCNC: 0.8 MG/DL (ref 0–1.2)
BILIRUB UR QL STRIP: NEGATIVE
BUN SERPL-MCNC: 20 MG/DL (ref 6–23)
CALCIUM SERPL-MCNC: 9.9 MG/DL (ref 8.6–10.2)
CHLORIDE SERPL-SCNC: 106 MMOL/L (ref 98–107)
CLARITY UR: ABNORMAL
CO2 SERPL-SCNC: 25 MMOL/L (ref 22–29)
COLOR UR: ABNORMAL
CREAT SERPL-MCNC: 1.1 MG/DL (ref 0.5–1)
EOSINOPHIL # BLD: 0.16 K/UL (ref 0.05–0.5)
EOSINOPHILS RELATIVE PERCENT: 1 % (ref 0–6)
ERYTHROCYTE [DISTWIDTH] IN BLOOD BY AUTOMATED COUNT: 14.9 % (ref 11.5–15)
GFR, ESTIMATED: 51 ML/MIN/1.73M2
GLUCOSE SERPL-MCNC: 164 MG/DL (ref 74–99)
GLUCOSE UR STRIP-MCNC: NEGATIVE MG/DL
HCT VFR BLD AUTO: 45.6 % (ref 34–48)
HGB BLD-MCNC: 14.1 G/DL (ref 11.5–15.5)
HGB UR QL STRIP.AUTO: ABNORMAL
IMM GRANULOCYTES # BLD AUTO: 0.07 K/UL (ref 0–0.58)
IMM GRANULOCYTES NFR BLD: 1 % (ref 0–5)
KETONES UR STRIP-MCNC: NEGATIVE MG/DL
LACTATE BLDV-SCNC: 2.5 MMOL/L (ref 0.5–2.2)
LEUKOCYTE ESTERASE UR QL STRIP: ABNORMAL
LIPASE SERPL-CCNC: 19 U/L (ref 13–60)
LYMPHOCYTES NFR BLD: 1.41 K/UL (ref 1.5–4)
LYMPHOCYTES RELATIVE PERCENT: 11 % (ref 20–42)
MCH RBC QN AUTO: 30.2 PG (ref 26–35)
MCHC RBC AUTO-ENTMCNC: 30.9 G/DL (ref 32–34.5)
MCV RBC AUTO: 97.6 FL (ref 80–99.9)
MONOCYTES NFR BLD: 0.76 K/UL (ref 0.1–0.95)
MONOCYTES NFR BLD: 6 % (ref 2–12)
NEUTROPHILS NFR BLD: 81 % (ref 43–80)
NEUTS SEG NFR BLD: 10.32 K/UL (ref 1.8–7.3)
NITRITE UR QL STRIP: NEGATIVE
PH UR STRIP: 6.5 [PH] (ref 5–9)
PLATELET # BLD AUTO: 261 K/UL (ref 130–450)
PMV BLD AUTO: 9.1 FL (ref 7–12)
POTASSIUM SERPL-SCNC: 4.3 MMOL/L (ref 3.5–5)
PROT SERPL-MCNC: 7.3 G/DL (ref 6.4–8.3)
PROT UR STRIP-MCNC: 100 MG/DL
RBC # BLD AUTO: 4.67 M/UL (ref 3.5–5.5)
RBC #/AREA URNS HPF: ABNORMAL /HPF
SODIUM SERPL-SCNC: 143 MMOL/L (ref 132–146)
SP GR UR STRIP: 1.02 (ref 1–1.03)
UROBILINOGEN UR STRIP-ACNC: 0.2 EU/DL (ref 0–1)
WBC #/AREA URNS HPF: ABNORMAL /HPF
WBC OTHER # BLD: 12.8 K/UL (ref 4.5–11.5)

## 2024-09-09 PROCEDURE — 70450 CT HEAD/BRAIN W/O DYE: CPT

## 2024-09-09 PROCEDURE — 85025 COMPLETE CBC W/AUTO DIFF WBC: CPT

## 2024-09-09 PROCEDURE — 2580000003 HC RX 258: Performed by: EMERGENCY MEDICINE

## 2024-09-09 PROCEDURE — 82248 BILIRUBIN DIRECT: CPT

## 2024-09-09 PROCEDURE — 6360000004 HC RX CONTRAST MEDICATION: Performed by: RADIOLOGY

## 2024-09-09 PROCEDURE — 83605 ASSAY OF LACTIC ACID: CPT

## 2024-09-09 PROCEDURE — 83690 ASSAY OF LIPASE: CPT

## 2024-09-09 PROCEDURE — 80053 COMPREHEN METABOLIC PANEL: CPT

## 2024-09-09 PROCEDURE — 81001 URINALYSIS AUTO W/SCOPE: CPT

## 2024-09-09 PROCEDURE — 99285 EMERGENCY DEPT VISIT HI MDM: CPT

## 2024-09-09 PROCEDURE — 74178 CT ABD&PLV WO CNTR FLWD CNTR: CPT

## 2024-09-09 RX ORDER — 0.9 % SODIUM CHLORIDE 0.9 %
500 INTRAVENOUS SOLUTION INTRAVENOUS ONCE
Status: COMPLETED | OUTPATIENT
Start: 2024-09-09 | End: 2024-09-09

## 2024-09-09 RX ORDER — IOPAMIDOL 755 MG/ML
75 INJECTION, SOLUTION INTRAVASCULAR
Status: COMPLETED | OUTPATIENT
Start: 2024-09-09 | End: 2024-09-09

## 2024-09-09 RX ADMIN — IOPAMIDOL 75 ML: 755 INJECTION, SOLUTION INTRAVENOUS at 23:03

## 2024-09-09 RX ADMIN — SODIUM CHLORIDE 500 ML: 9 INJECTION, SOLUTION INTRAVENOUS at 21:48

## 2024-09-09 ASSESSMENT — PAIN DESCRIPTION - PAIN TYPE: TYPE: ACUTE PAIN

## 2024-09-09 ASSESSMENT — PAIN SCALES - GENERAL: PAINLEVEL_OUTOF10: 10

## 2024-09-09 ASSESSMENT — PAIN DESCRIPTION - LOCATION: LOCATION: ABDOMEN

## 2024-09-09 ASSESSMENT — PAIN DESCRIPTION - ORIENTATION: ORIENTATION: RIGHT;LOWER

## 2024-09-09 ASSESSMENT — ENCOUNTER SYMPTOMS
COUGH: 0
SHORTNESS OF BREATH: 0
ABDOMINAL PAIN: 1

## 2024-09-09 ASSESSMENT — LIFESTYLE VARIABLES
HOW OFTEN DO YOU HAVE A DRINK CONTAINING ALCOHOL: NEVER
HOW MANY STANDARD DRINKS CONTAINING ALCOHOL DO YOU HAVE ON A TYPICAL DAY: PATIENT DOES NOT DRINK

## 2024-09-09 ASSESSMENT — PAIN DESCRIPTION - DESCRIPTORS: DESCRIPTORS: ACHING

## 2024-09-09 ASSESSMENT — PAIN - FUNCTIONAL ASSESSMENT: PAIN_FUNCTIONAL_ASSESSMENT: ACTIVITIES ARE NOT PREVENTED

## 2024-09-09 ASSESSMENT — PAIN DESCRIPTION - FREQUENCY: FREQUENCY: CONTINUOUS

## 2024-09-10 PROCEDURE — 96374 THER/PROPH/DIAG INJ IV PUSH: CPT

## 2024-09-10 PROCEDURE — 6360000002 HC RX W HCPCS: Performed by: EMERGENCY MEDICINE

## 2024-09-10 PROCEDURE — 2580000003 HC RX 258: Performed by: EMERGENCY MEDICINE

## 2024-09-10 RX ORDER — CEFDINIR 300 MG/1
300 CAPSULE ORAL 2 TIMES DAILY
Qty: 14 CAPSULE | Refills: 0 | Status: SHIPPED | OUTPATIENT
Start: 2024-09-10 | End: 2024-09-17

## 2024-09-10 RX ORDER — ONDANSETRON 4 MG/1
4 TABLET, FILM COATED ORAL EVERY 8 HOURS PRN
Qty: 20 TABLET | Refills: 0 | Status: SHIPPED | OUTPATIENT
Start: 2024-09-10

## 2024-09-10 RX ADMIN — WATER 2000 MG: 1 INJECTION INTRAMUSCULAR; INTRAVENOUS; SUBCUTANEOUS at 05:19

## 2024-10-21 ENCOUNTER — HOSPITAL ENCOUNTER (OUTPATIENT)
Age: 89
Discharge: HOME | End: 2024-10-21
Payer: MEDICARE

## 2024-10-21 LAB
ALBUMIN SERPL-MCNC: 4 G/DL (ref 3.5–5.2)
ALP SERPL-CCNC: 73 U/L (ref 35–104)
ALT SERPL-CCNC: 11 U/L (ref 0–32)
ANION GAP SERPL CALCULATED.3IONS-SCNC: 13 MMOL/L (ref 7–16)
AST SERPL-CCNC: 16 U/L (ref 0–31)
BASOPHILS # BLD: 0.04 K/UL (ref 0–0.2)
BASOPHILS NFR BLD: 1 % (ref 0–2)
BILIRUB SERPL-MCNC: 0.9 MG/DL (ref 0–1.2)
BUN SERPL-MCNC: 15 MG/DL (ref 6–23)
CALCIUM SERPL-MCNC: 9.7 MG/DL (ref 8.6–10.2)
CHLORIDE SERPL-SCNC: 107 MMOL/L (ref 98–107)
CHOLEST SERPL-MCNC: 110 MG/DL
CO2 SERPL-SCNC: 22 MMOL/L (ref 22–29)
CREAT SERPL-MCNC: 0.9 MG/DL (ref 0.5–1)
CREAT UR-MCNC: 66.1 MG/DL (ref 29–226)
EOSINOPHIL # BLD: 0.25 K/UL (ref 0.05–0.5)
EOSINOPHILS RELATIVE PERCENT: 4 % (ref 0–6)
ERYTHROCYTE [DISTWIDTH] IN BLOOD BY AUTOMATED COUNT: 15.4 % (ref 11.5–15)
GFR, ESTIMATED: 63 ML/MIN/1.73M2
GLUCOSE P FAST SERPL-MCNC: 134 MG/DL (ref 74–99)
HBA1C MFR BLD: 7.3 % (ref 4–5.6)
HCT VFR BLD AUTO: 41.1 % (ref 34–48)
HDLC SERPL-MCNC: 37 MG/DL
HGB BLD-MCNC: 12.9 G/DL (ref 11.5–15.5)
IMM GRANULOCYTES # BLD AUTO: 0.03 K/UL (ref 0–0.58)
IMM GRANULOCYTES NFR BLD: 0 % (ref 0–5)
LDLC SERPL CALC-MCNC: 48 MG/DL
LYMPHOCYTES NFR BLD: 1.93 K/UL (ref 1.5–4)
LYMPHOCYTES RELATIVE PERCENT: 29 % (ref 20–42)
MCH RBC QN AUTO: 30.5 PG (ref 26–35)
MCHC RBC AUTO-ENTMCNC: 31.4 G/DL (ref 32–34.5)
MCV RBC AUTO: 97.2 FL (ref 80–99.9)
MICROALBUMIN UR-MCNC: 143 MG/L (ref 0–19)
MICROALBUMIN/CREAT UR-RTO: 216 MCG/MG CREAT (ref 0–30)
MONOCYTES NFR BLD: 0.5 K/UL (ref 0.1–0.95)
MONOCYTES NFR BLD: 7 % (ref 2–12)
NEUTROPHILS NFR BLD: 59 % (ref 43–80)
NEUTS SEG NFR BLD: 4 K/UL (ref 1.8–7.3)
PLATELET # BLD AUTO: 248 K/UL (ref 130–450)
PMV BLD AUTO: 9.9 FL (ref 7–12)
POTASSIUM SERPL-SCNC: 4.6 MMOL/L (ref 3.5–5)
PROT SERPL-MCNC: 6.4 G/DL (ref 6.4–8.3)
RBC # BLD AUTO: 4.23 M/UL (ref 3.5–5.5)
SODIUM SERPL-SCNC: 142 MMOL/L (ref 132–146)
T4 FREE SERPL-MCNC: 1.4 NG/DL (ref 0.9–1.7)
TRIGL SERPL-MCNC: 123 MG/DL
TSH SERPL DL<=0.05 MIU/L-ACNC: 3.86 UIU/ML (ref 0.27–4.2)
VLDLC SERPL CALC-MCNC: 25 MG/DL
WBC OTHER # BLD: 6.8 K/UL (ref 4.5–11.5)

## 2024-10-21 PROCEDURE — 36415 COLL VENOUS BLD VENIPUNCTURE: CPT

## 2024-10-21 PROCEDURE — 85025 COMPLETE CBC W/AUTO DIFF WBC: CPT

## 2024-10-21 PROCEDURE — 80053 COMPREHEN METABOLIC PANEL: CPT

## 2024-10-21 PROCEDURE — 83036 HEMOGLOBIN GLYCOSYLATED A1C: CPT

## 2024-10-21 PROCEDURE — 84439 ASSAY OF FREE THYROXINE: CPT

## 2024-10-21 PROCEDURE — 82043 UR ALBUMIN QUANTITATIVE: CPT

## 2024-10-21 PROCEDURE — 80061 LIPID PANEL: CPT

## 2024-10-21 PROCEDURE — 82652 VIT D 1 25-DIHYDROXY: CPT

## 2024-10-21 PROCEDURE — 84443 ASSAY THYROID STIM HORMONE: CPT

## 2024-10-21 PROCEDURE — 82570 ASSAY OF URINE CREATININE: CPT

## 2024-10-23 LAB — 1,25(OH)2D3 SERPL-MCNC: 58.6 PG/ML (ref 19.9–79.3)

## 2025-04-25 ENCOUNTER — HOSPITAL ENCOUNTER (OUTPATIENT)
Age: 89
Setting detail: OBSERVATION
Discharge: HOME | End: 2025-04-25
Attending: EMERGENCY MEDICINE | Admitting: INTERNAL MEDICINE
Payer: MEDICARE

## 2025-04-25 VITALS
SYSTOLIC BLOOD PRESSURE: 136 MMHG | RESPIRATION RATE: 17 BRPM | HEART RATE: 99 BPM | WEIGHT: 210 LBS | HEIGHT: 62 IN | TEMPERATURE: 98.4 F | DIASTOLIC BLOOD PRESSURE: 84 MMHG | BODY MASS INDEX: 38.64 KG/M2 | OXYGEN SATURATION: 100 %

## 2025-04-25 DIAGNOSIS — N20.0 KIDNEY STONE: Primary | ICD-10-CM

## 2025-04-25 PROBLEM — N13.9 OBSTRUCTIVE UROPATHY: Status: ACTIVE | Noted: 2025-04-25

## 2025-04-25 LAB
ALBUMIN SERPL-MCNC: 4.1 G/DL (ref 3.5–5.2)
ALP SERPL-CCNC: 78 U/L (ref 35–104)
ALT SERPL-CCNC: 15 U/L (ref 0–32)
ANION GAP SERPL CALCULATED.3IONS-SCNC: 11 MMOL/L (ref 7–16)
AST SERPL-CCNC: 20 U/L (ref 0–31)
BACTERIA URNS QL MICRO: ABNORMAL
BASOPHILS # BLD: 0.05 K/UL (ref 0–0.2)
BASOPHILS NFR BLD: 1 % (ref 0–2)
BILIRUB SERPL-MCNC: 0.6 MG/DL (ref 0–1.2)
BILIRUB UR QL STRIP: NEGATIVE
BUN SERPL-MCNC: 21 MG/DL (ref 6–23)
CALCIUM SERPL-MCNC: 9.2 MG/DL (ref 8.6–10.2)
CHLORIDE SERPL-SCNC: 108 MMOL/L (ref 98–107)
CLARITY UR: CLEAR
CO2 SERPL-SCNC: 22 MMOL/L (ref 22–29)
COLOR UR: YELLOW
CREAT SERPL-MCNC: 1 MG/DL (ref 0.5–1)
EKG ATRIAL RATE: 75 BPM
EKG Q-T INTERVAL: 368 MS
EKG QRS DURATION: 94 MS
EKG QTC CALCULATION (BAZETT): 447 MS
EKG R AXIS: -34 DEGREES
EKG T AXIS: 54 DEGREES
EKG VENTRICULAR RATE: 89 BPM
EOSINOPHIL # BLD: 0.28 K/UL (ref 0.05–0.5)
EOSINOPHILS RELATIVE PERCENT: 4 % (ref 0–6)
ERYTHROCYTE [DISTWIDTH] IN BLOOD BY AUTOMATED COUNT: 14.9 % (ref 11.5–15)
GFR, ESTIMATED: 55 ML/MIN/1.73M2
GLUCOSE SERPL-MCNC: 123 MG/DL (ref 74–99)
GLUCOSE UR STRIP-MCNC: NEGATIVE MG/DL
HCT VFR BLD AUTO: 39.5 % (ref 34–48)
HGB BLD-MCNC: 12.6 G/DL (ref 11.5–15.5)
HGB UR QL STRIP.AUTO: ABNORMAL
IMM GRANULOCYTES # BLD AUTO: 0.03 K/UL (ref 0–0.58)
IMM GRANULOCYTES NFR BLD: 0 % (ref 0–5)
KETONES UR STRIP-MCNC: NEGATIVE MG/DL
LACTATE BLDV-SCNC: 1.5 MMOL/L (ref 0.5–2.2)
LEUKOCYTE ESTERASE UR QL STRIP: ABNORMAL
LIPASE SERPL-CCNC: 24 U/L (ref 13–60)
LYMPHOCYTES NFR BLD: 2.23 K/UL (ref 1.5–4)
LYMPHOCYTES RELATIVE PERCENT: 28 % (ref 20–42)
MCH RBC QN AUTO: 31.1 PG (ref 26–35)
MCHC RBC AUTO-ENTMCNC: 31.9 G/DL (ref 32–34.5)
MCV RBC AUTO: 97.5 FL (ref 80–99.9)
MONOCYTES NFR BLD: 0.53 K/UL (ref 0.1–0.95)
MONOCYTES NFR BLD: 7 % (ref 2–12)
NEUTROPHILS NFR BLD: 61 % (ref 43–80)
NEUTS SEG NFR BLD: 4.8 K/UL (ref 1.8–7.3)
NITRITE UR QL STRIP: NEGATIVE
PH UR STRIP: 6 [PH] (ref 5–8)
PLATELET # BLD AUTO: 246 K/UL (ref 130–450)
PMV BLD AUTO: 10.2 FL (ref 7–12)
POTASSIUM SERPL-SCNC: 4.7 MMOL/L (ref 3.5–5)
PROT SERPL-MCNC: 6.3 G/DL (ref 6.4–8.3)
PROT UR STRIP-MCNC: NEGATIVE MG/DL
RBC # BLD AUTO: 4.05 M/UL (ref 3.5–5.5)
RBC #/AREA URNS HPF: ABNORMAL /HPF
SODIUM SERPL-SCNC: 141 MMOL/L (ref 132–146)
SP GR UR STRIP: 1.01 (ref 1–1.03)
TROPONIN I SERPL HS-MCNC: 26 NG/L (ref 0–14)
TROPONIN I SERPL HS-MCNC: 28 NG/L (ref 0–14)
UROBILINOGEN UR STRIP-ACNC: 0.2 EU/DL (ref 0–1)
WBC #/AREA URNS HPF: ABNORMAL /HPF
WBC OTHER # BLD: 7.9 K/UL (ref 4.5–11.5)

## 2025-04-25 PROCEDURE — 96374 THER/PROPH/DIAG INJ IV PUSH: CPT

## 2025-04-25 PROCEDURE — 80053 COMPREHEN METABOLIC PANEL: CPT

## 2025-04-25 PROCEDURE — 83605 ASSAY OF LACTIC ACID: CPT

## 2025-04-25 PROCEDURE — 6360000002 HC RX W HCPCS: Performed by: NURSE PRACTITIONER

## 2025-04-25 PROCEDURE — 93005 ELECTROCARDIOGRAM TRACING: CPT

## 2025-04-25 PROCEDURE — 87086 URINE CULTURE/COLONY COUNT: CPT

## 2025-04-25 PROCEDURE — 2500000003 HC RX 250 WO HCPCS: Performed by: NURSE PRACTITIONER

## 2025-04-25 PROCEDURE — G0378 HOSPITAL OBSERVATION PER HR: HCPCS

## 2025-04-25 PROCEDURE — 81001 URINALYSIS AUTO W/SCOPE: CPT

## 2025-04-25 PROCEDURE — 85025 COMPLETE CBC W/AUTO DIFF WBC: CPT

## 2025-04-25 PROCEDURE — 83690 ASSAY OF LIPASE: CPT

## 2025-04-25 PROCEDURE — 99285 EMERGENCY DEPT VISIT HI MDM: CPT

## 2025-04-25 PROCEDURE — 84484 ASSAY OF TROPONIN QUANT: CPT

## 2025-04-25 RX ORDER — ONDANSETRON 2 MG/ML
4 INJECTION INTRAMUSCULAR; INTRAVENOUS EVERY 6 HOURS PRN
Status: DISCONTINUED | OUTPATIENT
Start: 2025-04-25 | End: 2025-04-25 | Stop reason: HOSPADM

## 2025-04-25 RX ORDER — INSULIN LISPRO 100 [IU]/ML
0-4 INJECTION, SOLUTION INTRAVENOUS; SUBCUTANEOUS
Status: DISCONTINUED | OUTPATIENT
Start: 2025-04-25 | End: 2025-04-25 | Stop reason: HOSPADM

## 2025-04-25 RX ORDER — POTASSIUM CHLORIDE 1500 MG/1
40 TABLET, EXTENDED RELEASE ORAL PRN
Status: DISCONTINUED | OUTPATIENT
Start: 2025-04-25 | End: 2025-04-25 | Stop reason: HOSPADM

## 2025-04-25 RX ORDER — METOPROLOL SUCCINATE 25 MG/1
12.5 TABLET, EXTENDED RELEASE ORAL DAILY
Status: DISCONTINUED | OUTPATIENT
Start: 2025-04-25 | End: 2025-04-25 | Stop reason: HOSPADM

## 2025-04-25 RX ORDER — ACETAMINOPHEN 325 MG/1
650 TABLET ORAL EVERY 6 HOURS PRN
Status: DISCONTINUED | OUTPATIENT
Start: 2025-04-25 | End: 2025-04-25 | Stop reason: HOSPADM

## 2025-04-25 RX ORDER — ENOXAPARIN SODIUM 100 MG/ML
40 INJECTION SUBCUTANEOUS DAILY
Status: DISCONTINUED | OUTPATIENT
Start: 2025-04-25 | End: 2025-04-25 | Stop reason: HOSPADM

## 2025-04-25 RX ORDER — SODIUM CHLORIDE 0.9 % (FLUSH) 0.9 %
10 SYRINGE (ML) INJECTION EVERY 12 HOURS SCHEDULED
Status: DISCONTINUED | OUTPATIENT
Start: 2025-04-25 | End: 2025-04-25 | Stop reason: HOSPADM

## 2025-04-25 RX ORDER — SODIUM CHLORIDE 0.9 % (FLUSH) 0.9 %
10 SYRINGE (ML) INJECTION PRN
Status: DISCONTINUED | OUTPATIENT
Start: 2025-04-25 | End: 2025-04-25 | Stop reason: HOSPADM

## 2025-04-25 RX ORDER — SENNOSIDES A AND B 8.6 MG/1
1 TABLET, FILM COATED ORAL DAILY PRN
Status: DISCONTINUED | OUTPATIENT
Start: 2025-04-25 | End: 2025-04-25 | Stop reason: HOSPADM

## 2025-04-25 RX ORDER — GLUCAGON 1 MG/ML
1 KIT INJECTION PRN
Status: DISCONTINUED | OUTPATIENT
Start: 2025-04-25 | End: 2025-04-25 | Stop reason: HOSPADM

## 2025-04-25 RX ORDER — ACETAMINOPHEN 650 MG/1
650 SUPPOSITORY RECTAL EVERY 6 HOURS PRN
Status: DISCONTINUED | OUTPATIENT
Start: 2025-04-25 | End: 2025-04-25 | Stop reason: HOSPADM

## 2025-04-25 RX ORDER — SODIUM CHLORIDE 9 MG/ML
INJECTION, SOLUTION INTRAVENOUS CONTINUOUS
Status: DISCONTINUED | OUTPATIENT
Start: 2025-04-25 | End: 2025-04-25 | Stop reason: HOSPADM

## 2025-04-25 RX ORDER — HYDROCODONE BITARTRATE AND ACETAMINOPHEN 5; 325 MG/1; MG/1
1 TABLET ORAL ONCE
Status: DISCONTINUED | OUTPATIENT
Start: 2025-04-25 | End: 2025-04-25 | Stop reason: HOSPADM

## 2025-04-25 RX ORDER — DEXTROSE MONOHYDRATE 100 MG/ML
INJECTION, SOLUTION INTRAVENOUS CONTINUOUS PRN
Status: DISCONTINUED | OUTPATIENT
Start: 2025-04-25 | End: 2025-04-25 | Stop reason: HOSPADM

## 2025-04-25 RX ORDER — SODIUM CHLORIDE 9 MG/ML
INJECTION, SOLUTION INTRAVENOUS PRN
Status: DISCONTINUED | OUTPATIENT
Start: 2025-04-25 | End: 2025-04-25 | Stop reason: HOSPADM

## 2025-04-25 RX ORDER — ONDANSETRON 4 MG/1
4 TABLET, ORALLY DISINTEGRATING ORAL EVERY 8 HOURS PRN
Status: DISCONTINUED | OUTPATIENT
Start: 2025-04-25 | End: 2025-04-25 | Stop reason: HOSPADM

## 2025-04-25 RX ADMIN — WATER 1000 MG: 1 INJECTION INTRAMUSCULAR; INTRAVENOUS; SUBCUTANEOUS at 17:06

## 2025-04-25 ASSESSMENT — PAIN DESCRIPTION - ORIENTATION: ORIENTATION: RIGHT

## 2025-04-25 ASSESSMENT — PAIN DESCRIPTION - LOCATION: LOCATION: FLANK

## 2025-04-25 NOTE — CARE COORDINATION
Patient had presented to my office due to flank pain for 3 weeks. Found to have 1 cm kidney stone on CT scan from [facility]. Was directed to ER. Urology consulted and recommended admission to medicine. Urine showed infection and ceftriaxone started. Coverage to see in am.     Gena Lopez, DO

## 2025-04-25 NOTE — ED PROVIDER NOTES
Shared TEAGAN-ED Attending Visit.  CC: No         [facility]  Department of Emergency Medicine   ED  Encounter Note  Admit Date/RoomTime: 2025  2:23 PM  ED Room: [place] [address]    NAME: Scarlet Potter  : 1935  MRN: [id number]     Chief Complaint:  Flank Pain (Right sided ongoing for weeks; hx  Kidney stone; pcp started on Cefdinir today ) and Hematuria    History of Present Illness       Scarlet Potter is a 89 y.o. old female who presents to the emergency department by private vehicle, for gradual onset, persistent aching pain in the right flank with radiation to the abdomen which began 3 week(s) prior to arrival.   There has been similar episodes in the past kidney stones.  Since onset the symptoms have been remaining constant.  The pain is associated with urinary frequency and hematuria.  The pain is aggravated by nothing in particular and relieved by nothing.  There has been NO chest pain, shortness of breath, fever, chills, sweating, nausea, vomiting, anorexia, weight loss, diarrhea, constipation, dark/black stools, blood in stool, blood in emesis, cloudy urine, urinary frequency, urinary incontinence, vaginal discharge, vaginal itching, or vaginal bleeding.   Patient had a CT scan at [facility] today and was sent in for a kidney stone.  She was placed on Omnicef and did receive her first dose this morning..      .  ROS   Pertinent positives and negatives are stated within HPI, all other systems reviewed and are negative.    Past Medical History:  has a past medical history of Arthritis, CAD (coronary artery disease), Depression, Diabetes mellitus (HCC), H/O cardiovascular stress test, Heartburn, History of kidney stones, Hx of blood clots, Hyperlipidemia, Hypertension, Left hip pain, Myocardial infarct (HCC), Thyroid disease, Use of cane as ambulatory aid, and Vitamin D deficiency.    Surgical History has a past surgical history that includes Hysterectomy; Parathyroid gland

## 2025-04-25 NOTE — DISCHARGE INSTR - COC
Continuity of Care Form    Patient Name: Scarlet Potter   :  1935  MRN:  [id number]    Admit date:  2025  Discharge date:  ***    Code Status Order: Full Code   Advance Directives:     Admitting Physician:  Adi Glynn DO  PCP: Gena Lopez DO    Discharging Nurse: ***  Discharging Hospital Unit/Room#: hospitals/[place] [address]  Discharging Unit Phone Number: ***    Emergency Contact:   Extended Emergency Contact Information  Primary Emergency Contact: Brandan Abbott  Address: [address] [place] [address]  Home Phone: [phone]  Relation: Child  Preferred language: English   needed? No  Secondary Emergency Contact: Enzo Abbott/Lety (Poa)   [facility]  Home Phone: [phone]  Mobile Phone: [phone]  Relation: Child    Past Surgical History:  Past Surgical History:   Procedure Laterality Date    ARTHROGRAPHY Left 2021    LEFT HIP INJECTION ARTHROGRAM performed by Esequiel Lomeli MD at [facility] OR    CARDIAC CATHETERIZATION   and     CATARACT REMOVAL WITH IMPLANT Bilateral     HYSTERECTOMY (CERVIX STATUS UNKNOWN)      JOINT REPLACEMENT Right     hip    KNEE ARTHROPLASTY Left 2016    OTHER SURGICAL HISTORY Right 2013    hip arthrogram    PARATHYROID GLAND SURGERY      TOTAL HIP ARTHROPLASTY Left 2021    LEFT HIP TOTAL ARTHROPLASTY performed by Esequiel Lomeli MD at [facility] OR    TOTAL KNEE ARTHROPLASTY Right 2/19/15    RIGHT TOTAL KNEE ARTHROPLASTY        Immunization History:   Immunization History   Administered Date(s) Administered    COVID-19, PFIZER Bivalent, DO NOT Dilute, (age 12y+), IM, 30 mcg/0.3 mL 2022    COVID-19, PFIZER GRAY top, DO NOT Dilute, (age 12 y+), IM, 30 mcg/0.3 mL 2022    COVID-19, PFIZER PURPLE top, DILUTE for use, (age 12 y+), 30mcg/0.3mL 2021, 2021, 2021    COVID-19, PFIZER, , (age 12y+), IM, 30mcg/0.3mL 2023       Active  for {GREATER/LESS:545374229} 30 days.     Update Admission H&P: {CHP DME Changes in HandP:982492150}    PHYSICIAN SIGNATURE:  {Esignature:[id number]}

## 2025-04-25 NOTE — ED NOTES
Department of Emergency Medicine  FIRST PROVIDER TRIAGE NOTE             Independent MLP           4/25/25  12:56 PM EDT    Date of Encounter: 4/25/25   MRN: [id number]      HPI: Scarlet Potter is a 89 y.o. female who presents to the ED for Flank Pain (Right sided ongoing for weeks; hx  Kidney stone; pcp started on Cefdinir today ) and Hematuria      ROS: Negative for cp or sob.    PE: Gen Appearance/Constitutional: alert  HEENT: NC/NT. PERRLA,  Airway patent.    Provider-Patient relationship only established for Provider In Triage (PIT)  Full assessment, HPI and examination not performed, therefore, it is not yet possible to state whether or not an emergency medical condition exists   Focused assessment only during triage. This is not a comprehensive evaluation due to no physical ER space, staff shortage and high numbers of boarding patients in the ER.       Initial Plan of Care: All treatment areas with department are currently occupied. Plan to order/Initiate the following while awaiting opening in ED.  Initiate Treatment-Testing, Proceed toTreatment Area When Bed Available for ED Attending/MLP to Continue Care    Electronically signed by ION Carson CNP   DD: 4/25/25      Edgar Sahu APRN - CNP  04/25/25 [id number]

## 2025-04-26 LAB
MICROORGANISM SPEC CULT: ABNORMAL
SERVICE CMNT-IMP: ABNORMAL
SPECIMEN DESCRIPTION: ABNORMAL

## 2025-04-27 ENCOUNTER — RESULTS FOLLOW-UP (OUTPATIENT)
Dept: EMERGENCY DEPT | Age: 89
End: 2025-04-27

## 2025-04-29 LAB
EKG ATRIAL RATE: 75 BPM
EKG Q-T INTERVAL: 368 MS
EKG QRS DURATION: 94 MS
EKG QTC CALCULATION (BAZETT): 447 MS
EKG R AXIS: -34 DEGREES
EKG T AXIS: 54 DEGREES
EKG VENTRICULAR RATE: 89 BPM

## 2025-08-06 ENCOUNTER — HOSPITAL ENCOUNTER (INPATIENT)
Age: 89
LOS: 4 days | Discharge: HOME HEALTH | DRG: 872 | End: 2025-08-10
Attending: EMERGENCY MEDICINE | Admitting: INTERNAL MEDICINE
Payer: MEDICARE

## 2025-08-06 ENCOUNTER — APPOINTMENT (OUTPATIENT)
Dept: CT IMAGING | Age: 89
DRG: 872 | End: 2025-08-06
Payer: MEDICARE

## 2025-08-06 ENCOUNTER — APPOINTMENT (OUTPATIENT)
Dept: GENERAL RADIOLOGY | Age: 89
DRG: 872 | End: 2025-08-06
Payer: MEDICARE

## 2025-08-06 DIAGNOSIS — I48.21 PERMANENT ATRIAL FIBRILLATION (HCC): ICD-10-CM

## 2025-08-06 DIAGNOSIS — N39.0 URINARY TRACT INFECTION WITHOUT HEMATURIA, SITE UNSPECIFIED: Primary | ICD-10-CM

## 2025-08-06 DIAGNOSIS — A41.9 SEPSIS WITHOUT ACUTE ORGAN DYSFUNCTION, DUE TO UNSPECIFIED ORGANISM (HCC): ICD-10-CM

## 2025-08-06 LAB
ALBUMIN SERPL-MCNC: 3.3 G/DL (ref 3.5–5.2)
ALP SERPL-CCNC: 59 U/L (ref 35–104)
ALT SERPL-CCNC: 10 U/L (ref 0–35)
ANION GAP SERPL CALCULATED.3IONS-SCNC: 15 MMOL/L (ref 7–16)
AST SERPL-CCNC: 32 U/L (ref 0–35)
BACTERIA URNS QL MICRO: ABNORMAL
BASOPHILS # BLD: 0.04 K/UL (ref 0–0.2)
BASOPHILS NFR BLD: 0 % (ref 0–2)
BILIRUB SERPL-MCNC: 1.7 MG/DL (ref 0–1.2)
BILIRUB UR QL STRIP: NEGATIVE
BUN SERPL-MCNC: 19 MG/DL (ref 8–23)
CALCIUM SERPL-MCNC: 9.2 MG/DL (ref 8.8–10.2)
CHLORIDE SERPL-SCNC: 105 MMOL/L (ref 98–107)
CLARITY UR: CLEAR
CO2 SERPL-SCNC: 19 MMOL/L (ref 22–29)
COLOR UR: YELLOW
CREAT SERPL-MCNC: 0.9 MG/DL (ref 0.5–1)
EOSINOPHIL # BLD: 0 K/UL (ref 0.05–0.5)
EOSINOPHILS RELATIVE PERCENT: 0 % (ref 0–6)
ERYTHROCYTE [DISTWIDTH] IN BLOOD BY AUTOMATED COUNT: 14.4 % (ref 11.5–15)
GFR, ESTIMATED: 59 ML/MIN/1.73M2
GLUCOSE BLD-MCNC: 183 MG/DL (ref 74–99)
GLUCOSE BLD-MCNC: 201 MG/DL (ref 74–99)
GLUCOSE SERPL-MCNC: 212 MG/DL (ref 74–99)
GLUCOSE UR STRIP-MCNC: NEGATIVE MG/DL
HCT VFR BLD AUTO: 37.8 % (ref 34–48)
HGB BLD-MCNC: 11.9 G/DL (ref 11.5–15.5)
HGB UR QL STRIP.AUTO: ABNORMAL
IMM GRANULOCYTES # BLD AUTO: 0.22 K/UL (ref 0–0.58)
IMM GRANULOCYTES NFR BLD: 1 % (ref 0–5)
KETONES UR STRIP-MCNC: ABNORMAL MG/DL
LACTATE BLDV-SCNC: 2.4 MMOL/L (ref 0.5–1.9)
LACTATE BLDV-SCNC: 3 MMOL/L (ref 0.5–1.9)
LEUKOCYTE ESTERASE UR QL STRIP: ABNORMAL
LYMPHOCYTES NFR BLD: 1.31 K/UL (ref 1.5–4)
LYMPHOCYTES RELATIVE PERCENT: 6 % (ref 20–42)
MCH RBC QN AUTO: 31.2 PG (ref 26–35)
MCHC RBC AUTO-ENTMCNC: 31.5 G/DL (ref 32–34.5)
MCV RBC AUTO: 99 FL (ref 80–99.9)
MONOCYTES NFR BLD: 1.37 K/UL (ref 0.1–0.95)
MONOCYTES NFR BLD: 6 % (ref 2–12)
NEUTROPHILS NFR BLD: 88 % (ref 43–80)
NEUTS SEG NFR BLD: 21.09 K/UL (ref 1.8–7.3)
NITRITE UR QL STRIP: POSITIVE
PH UR STRIP: 6 [PH] (ref 5–8)
PLATELET # BLD AUTO: 271 K/UL (ref 130–450)
PMV BLD AUTO: 10.1 FL (ref 7–12)
POTASSIUM SERPL-SCNC: 4.4 MMOL/L (ref 3.5–5.1)
PROT SERPL-MCNC: 6.2 G/DL (ref 6.4–8.3)
PROT UR STRIP-MCNC: 100 MG/DL
RBC # BLD AUTO: 3.82 M/UL (ref 3.5–5.5)
RBC # BLD: ABNORMAL 10*6/UL
RBC #/AREA URNS HPF: ABNORMAL /HPF
SODIUM SERPL-SCNC: 138 MMOL/L (ref 136–145)
SP GR UR STRIP: 1.02 (ref 1–1.03)
TROPONIN I SERPL HS-MCNC: 41 NG/L (ref 0–14)
TROPONIN I SERPL HS-MCNC: 45 NG/L (ref 0–14)
UROBILINOGEN UR STRIP-ACNC: 0.2 EU/DL (ref 0–1)
WBC #/AREA URNS HPF: ABNORMAL /HPF
WBC OTHER # BLD: 24 K/UL (ref 4.5–11.5)

## 2025-08-06 PROCEDURE — 82962 GLUCOSE BLOOD TEST: CPT

## 2025-08-06 PROCEDURE — 80053 COMPREHEN METABOLIC PANEL: CPT

## 2025-08-06 PROCEDURE — 2500000003 HC RX 250 WO HCPCS: Performed by: EMERGENCY MEDICINE

## 2025-08-06 PROCEDURE — 70450 CT HEAD/BRAIN W/O DYE: CPT

## 2025-08-06 PROCEDURE — 6360000002 HC RX W HCPCS: Performed by: EMERGENCY MEDICINE

## 2025-08-06 PROCEDURE — 99285 EMERGENCY DEPT VISIT HI MDM: CPT

## 2025-08-06 PROCEDURE — 84484 ASSAY OF TROPONIN QUANT: CPT

## 2025-08-06 PROCEDURE — 87077 CULTURE AEROBIC IDENTIFY: CPT

## 2025-08-06 PROCEDURE — 6360000002 HC RX W HCPCS: Performed by: INTERNAL MEDICINE

## 2025-08-06 PROCEDURE — 87086 URINE CULTURE/COLONY COUNT: CPT

## 2025-08-06 PROCEDURE — 74177 CT ABD & PELVIS W/CONTRAST: CPT

## 2025-08-06 PROCEDURE — 6360000004 HC RX CONTRAST MEDICATION: Performed by: RADIOLOGY

## 2025-08-06 PROCEDURE — 83605 ASSAY OF LACTIC ACID: CPT

## 2025-08-06 PROCEDURE — 6360000002 HC RX W HCPCS

## 2025-08-06 PROCEDURE — 96375 TX/PRO/DX INJ NEW DRUG ADDON: CPT

## 2025-08-06 PROCEDURE — 96374 THER/PROPH/DIAG INJ IV PUSH: CPT

## 2025-08-06 PROCEDURE — 6370000000 HC RX 637 (ALT 250 FOR IP): Performed by: INTERNAL MEDICINE

## 2025-08-06 PROCEDURE — 85025 COMPLETE CBC W/AUTO DIFF WBC: CPT

## 2025-08-06 PROCEDURE — 2500000003 HC RX 250 WO HCPCS: Performed by: INTERNAL MEDICINE

## 2025-08-06 PROCEDURE — 2580000003 HC RX 258: Performed by: INTERNAL MEDICINE

## 2025-08-06 PROCEDURE — 2580000003 HC RX 258: Performed by: EMERGENCY MEDICINE

## 2025-08-06 PROCEDURE — 93005 ELECTROCARDIOGRAM TRACING: CPT

## 2025-08-06 PROCEDURE — 87040 BLOOD CULTURE FOR BACTERIA: CPT

## 2025-08-06 PROCEDURE — 2060000000 HC ICU INTERMEDIATE R&B

## 2025-08-06 PROCEDURE — 71045 X-RAY EXAM CHEST 1 VIEW: CPT

## 2025-08-06 PROCEDURE — 81001 URINALYSIS AUTO W/SCOPE: CPT

## 2025-08-06 RX ORDER — DEXTROSE MONOHYDRATE 100 MG/ML
INJECTION, SOLUTION INTRAVENOUS CONTINUOUS PRN
Status: DISCONTINUED | OUTPATIENT
Start: 2025-08-06 | End: 2025-08-10 | Stop reason: HOSPADM

## 2025-08-06 RX ORDER — BUPROPION HYDROCHLORIDE 150 MG/1
150 TABLET ORAL EVERY MORNING
COMMUNITY

## 2025-08-06 RX ORDER — METOPROLOL SUCCINATE 25 MG/1
12.5 TABLET, EXTENDED RELEASE ORAL DAILY
Status: DISCONTINUED | OUTPATIENT
Start: 2025-08-06 | End: 2025-08-08

## 2025-08-06 RX ORDER — 0.9 % SODIUM CHLORIDE 0.9 %
1000 INTRAVENOUS SOLUTION INTRAVENOUS ONCE
Status: COMPLETED | OUTPATIENT
Start: 2025-08-06 | End: 2025-08-06

## 2025-08-06 RX ORDER — CHOLESTYRAMINE 4 G/9G
4 POWDER, FOR SUSPENSION ORAL EVERY EVENING
Status: ON HOLD | COMMUNITY
End: 2025-08-10 | Stop reason: HOSPADM

## 2025-08-06 RX ORDER — SODIUM CHLORIDE 0.9 % (FLUSH) 0.9 %
10 SYRINGE (ML) INJECTION PRN
Status: DISCONTINUED | OUTPATIENT
Start: 2025-08-06 | End: 2025-08-10 | Stop reason: HOSPADM

## 2025-08-06 RX ORDER — MAGNESIUM SULFATE IN WATER 40 MG/ML
2000 INJECTION, SOLUTION INTRAVENOUS PRN
Status: DISCONTINUED | OUTPATIENT
Start: 2025-08-06 | End: 2025-08-10 | Stop reason: HOSPADM

## 2025-08-06 RX ORDER — 0.9 % SODIUM CHLORIDE 0.9 %
520 INTRAVENOUS SOLUTION INTRAVENOUS ONCE
Status: COMPLETED | OUTPATIENT
Start: 2025-08-06 | End: 2025-08-06

## 2025-08-06 RX ORDER — ATORVASTATIN CALCIUM 20 MG/1
20 TABLET, FILM COATED ORAL EVERY MORNING
Status: DISCONTINUED | OUTPATIENT
Start: 2025-08-07 | End: 2025-08-10 | Stop reason: HOSPADM

## 2025-08-06 RX ORDER — INSULIN LISPRO 100 [IU]/ML
0-4 INJECTION, SOLUTION INTRAVENOUS; SUBCUTANEOUS
Status: DISCONTINUED | OUTPATIENT
Start: 2025-08-06 | End: 2025-08-10 | Stop reason: HOSPADM

## 2025-08-06 RX ORDER — ACETAMINOPHEN 650 MG/1
650 SUPPOSITORY RECTAL EVERY 6 HOURS PRN
Status: DISCONTINUED | OUTPATIENT
Start: 2025-08-06 | End: 2025-08-10 | Stop reason: HOSPADM

## 2025-08-06 RX ORDER — PANTOPRAZOLE SODIUM 40 MG/1
40 TABLET, DELAYED RELEASE ORAL
Status: DISCONTINUED | OUTPATIENT
Start: 2025-08-07 | End: 2025-08-10 | Stop reason: HOSPADM

## 2025-08-06 RX ORDER — POTASSIUM CHLORIDE 1500 MG/1
40 TABLET, EXTENDED RELEASE ORAL PRN
Status: DISCONTINUED | OUTPATIENT
Start: 2025-08-06 | End: 2025-08-10 | Stop reason: HOSPADM

## 2025-08-06 RX ORDER — PROCHLORPERAZINE EDISYLATE 5 MG/ML
10 INJECTION INTRAMUSCULAR; INTRAVENOUS EVERY 6 HOURS PRN
Status: DISCONTINUED | OUTPATIENT
Start: 2025-08-06 | End: 2025-08-10 | Stop reason: HOSPADM

## 2025-08-06 RX ORDER — IOPAMIDOL 755 MG/ML
75 INJECTION, SOLUTION INTRAVASCULAR
Status: COMPLETED | OUTPATIENT
Start: 2025-08-06 | End: 2025-08-06

## 2025-08-06 RX ORDER — SODIUM CHLORIDE 0.9 % (FLUSH) 0.9 %
10 SYRINGE (ML) INJECTION EVERY 12 HOURS SCHEDULED
Status: DISCONTINUED | OUTPATIENT
Start: 2025-08-06 | End: 2025-08-10 | Stop reason: HOSPADM

## 2025-08-06 RX ORDER — ENOXAPARIN SODIUM 100 MG/ML
40 INJECTION SUBCUTANEOUS DAILY
Status: DISCONTINUED | OUTPATIENT
Start: 2025-08-06 | End: 2025-08-07

## 2025-08-06 RX ORDER — ONDANSETRON 4 MG/1
4 TABLET, ORALLY DISINTEGRATING ORAL EVERY 8 HOURS PRN
Status: DISCONTINUED | OUTPATIENT
Start: 2025-08-06 | End: 2025-08-06 | Stop reason: DRUGHIGH

## 2025-08-06 RX ORDER — ONDANSETRON 2 MG/ML
4 INJECTION INTRAMUSCULAR; INTRAVENOUS EVERY 6 HOURS PRN
Status: DISCONTINUED | OUTPATIENT
Start: 2025-08-06 | End: 2025-08-06 | Stop reason: DRUGHIGH

## 2025-08-06 RX ORDER — ONDANSETRON 2 MG/ML
4 INJECTION INTRAMUSCULAR; INTRAVENOUS ONCE
Status: COMPLETED | OUTPATIENT
Start: 2025-08-06 | End: 2025-08-06

## 2025-08-06 RX ORDER — SODIUM CHLORIDE 9 MG/ML
INJECTION, SOLUTION INTRAVENOUS CONTINUOUS
Status: DISCONTINUED | OUTPATIENT
Start: 2025-08-06 | End: 2025-08-08

## 2025-08-06 RX ORDER — PROCHLORPERAZINE MALEATE 10 MG
10 TABLET ORAL EVERY 8 HOURS PRN
Status: DISCONTINUED | OUTPATIENT
Start: 2025-08-06 | End: 2025-08-10 | Stop reason: HOSPADM

## 2025-08-06 RX ORDER — SODIUM CHLORIDE 9 MG/ML
INJECTION, SOLUTION INTRAVENOUS PRN
Status: DISCONTINUED | OUTPATIENT
Start: 2025-08-06 | End: 2025-08-10 | Stop reason: HOSPADM

## 2025-08-06 RX ORDER — SENNOSIDES 8.6 MG/1
1 TABLET ORAL DAILY PRN
Status: DISCONTINUED | OUTPATIENT
Start: 2025-08-06 | End: 2025-08-10 | Stop reason: HOSPADM

## 2025-08-06 RX ORDER — ACETAMINOPHEN 325 MG/1
650 TABLET ORAL EVERY 6 HOURS PRN
Status: DISCONTINUED | OUTPATIENT
Start: 2025-08-06 | End: 2025-08-10 | Stop reason: HOSPADM

## 2025-08-06 RX ORDER — BUPROPION HYDROCHLORIDE 150 MG/1
150 TABLET ORAL EVERY MORNING
Status: DISCONTINUED | OUTPATIENT
Start: 2025-08-07 | End: 2025-08-10 | Stop reason: HOSPADM

## 2025-08-06 RX ORDER — GLUCAGON 1 MG/ML
1 KIT INJECTION PRN
Status: DISCONTINUED | OUTPATIENT
Start: 2025-08-06 | End: 2025-08-10 | Stop reason: HOSPADM

## 2025-08-06 RX ORDER — ACETAMINOPHEN 160 MG
2000 TABLET,DISINTEGRATING ORAL EVERY MORNING
COMMUNITY

## 2025-08-06 RX ADMIN — WATER 1000 MG: 1 INJECTION INTRAMUSCULAR; INTRAVENOUS; SUBCUTANEOUS at 15:04

## 2025-08-06 RX ADMIN — SODIUM CHLORIDE: 0.9 INJECTION, SOLUTION INTRAVENOUS at 19:08

## 2025-08-06 RX ADMIN — ONDANSETRON 4 MG: 2 INJECTION, SOLUTION INTRAMUSCULAR; INTRAVENOUS at 15:16

## 2025-08-06 RX ADMIN — SODIUM CHLORIDE, PRESERVATIVE FREE 10 ML: 5 INJECTION INTRAVENOUS at 19:07

## 2025-08-06 RX ADMIN — IOPAMIDOL 75 ML: 755 INJECTION, SOLUTION INTRAVENOUS at 13:51

## 2025-08-06 RX ADMIN — ENOXAPARIN SODIUM 40 MG: 100 INJECTION SUBCUTANEOUS at 19:04

## 2025-08-06 RX ADMIN — SODIUM CHLORIDE 520 ML: 9 INJECTION, SOLUTION INTRAVENOUS at 15:03

## 2025-08-06 RX ADMIN — METOPROLOL SUCCINATE 12.5 MG: 25 TABLET, EXTENDED RELEASE ORAL at 17:28

## 2025-08-06 RX ADMIN — SODIUM CHLORIDE 1000 ML: 9 INJECTION, SOLUTION INTRAVENOUS at 12:59

## 2025-08-06 RX ADMIN — INSULIN LISPRO 1 UNITS: 100 INJECTION, SOLUTION INTRAVENOUS; SUBCUTANEOUS at 20:14

## 2025-08-07 ENCOUNTER — APPOINTMENT (OUTPATIENT)
Age: 89
DRG: 872 | End: 2025-08-07
Payer: MEDICARE

## 2025-08-07 PROBLEM — N39.0 URINARY TRACT INFECTION WITHOUT HEMATURIA: Status: ACTIVE | Noted: 2025-08-07

## 2025-08-07 LAB
ALBUMIN SERPL-MCNC: 2.9 G/DL (ref 3.5–5.2)
ALP SERPL-CCNC: 67 U/L (ref 35–104)
ALT SERPL-CCNC: 9 U/L (ref 0–35)
ANION GAP SERPL CALCULATED.3IONS-SCNC: 13 MMOL/L (ref 7–16)
AST SERPL-CCNC: 23 U/L (ref 0–35)
BASOPHILS # BLD: 0.04 K/UL (ref 0–0.2)
BASOPHILS NFR BLD: 0 % (ref 0–2)
BILIRUB SERPL-MCNC: 1 MG/DL (ref 0–1.2)
BUN SERPL-MCNC: 21 MG/DL (ref 8–23)
CALCIUM SERPL-MCNC: 8.4 MG/DL (ref 8.8–10.2)
CHLORIDE SERPL-SCNC: 109 MMOL/L (ref 98–107)
CHOLEST SERPL-MCNC: 63 MG/DL
CO2 SERPL-SCNC: 16 MMOL/L (ref 22–29)
CREAT SERPL-MCNC: 1.1 MG/DL (ref 0.5–1)
ECHO AO ROOT DIAM: 3 CM
ECHO AO ROOT INDEX: 1.53 CM/M2
ECHO AO SINUS VALSALVA DIAM: 3 CM
ECHO AO SINUS VALSALVA INDEX: 1.53 CM/M2
ECHO AV AREA PEAK VELOCITY: 2 CM2
ECHO AV AREA VTI: 2.3 CM2
ECHO AV AREA/BSA PEAK VELOCITY: 1 CM2/M2
ECHO AV AREA/BSA VTI: 1.2 CM2/M2
ECHO AV CUSP MM: 1.4 CM
ECHO AV MEAN GRADIENT: 3 MMHG
ECHO AV MEAN VELOCITY: 0.9 M/S
ECHO AV PEAK GRADIENT: 6 MMHG
ECHO AV PEAK VELOCITY: 1.2 M/S
ECHO AV VELOCITY RATIO: 0.67
ECHO AV VTI: 18.9 CM
ECHO BSA: 2.04 M2
ECHO EST RA PRESSURE: 8 MMHG
ECHO LA DIAMETER INDEX: 2.19 CM/M2
ECHO LA DIAMETER: 4.3 CM
ECHO LA TO AORTIC ROOT RATIO: 1.43
ECHO LA VOL A-L A2C: 100 ML (ref 22–52)
ECHO LA VOL A-L A4C: 81 ML (ref 22–52)
ECHO LA VOL MOD A2C: 94 ML (ref 22–52)
ECHO LA VOL MOD A4C: 76 ML (ref 22–52)
ECHO LA VOLUME AREA LENGTH: 98 ML
ECHO LA VOLUME INDEX A-L A2C: 51 ML/M2 (ref 16–34)
ECHO LA VOLUME INDEX A-L A4C: 41 ML/M2 (ref 16–34)
ECHO LA VOLUME INDEX AREA LENGTH: 50 ML/M2 (ref 16–34)
ECHO LA VOLUME INDEX MOD A2C: 48 ML/M2 (ref 16–34)
ECHO LA VOLUME INDEX MOD A4C: 39 ML/M2 (ref 16–34)
ECHO LV EDV A2C: 36 ML
ECHO LV EDV A4C: 42 ML
ECHO LV EDV BP: 40 ML (ref 56–104)
ECHO LV EDV INDEX A4C: 21 ML/M2
ECHO LV EDV INDEX BP: 20 ML/M2
ECHO LV EDV NDEX A2C: 18 ML/M2
ECHO LV EF PHYSICIAN: 65 %
ECHO LV EJECTION FRACTION A2C: 62 %
ECHO LV EJECTION FRACTION A4C: 69 %
ECHO LV EJECTION FRACTION BIPLANE: 66 % (ref 55–100)
ECHO LV ESV A2C: 14 ML
ECHO LV ESV A4C: 13 ML
ECHO LV ESV BP: 14 ML (ref 19–49)
ECHO LV ESV INDEX A2C: 7 ML/M2
ECHO LV ESV INDEX A4C: 7 ML/M2
ECHO LV ESV INDEX BP: 7 ML/M2
ECHO LV FRACTIONAL SHORTENING: 32 % (ref 28–44)
ECHO LV INTERNAL DIMENSION DIASTOLE INDEX: 1.58 CM/M2
ECHO LV INTERNAL DIMENSION DIASTOLIC: 3.1 CM (ref 3.9–5.3)
ECHO LV INTERNAL DIMENSION SYSTOLIC INDEX: 1.07 CM/M2
ECHO LV INTERNAL DIMENSION SYSTOLIC: 2.1 CM
ECHO LV IVSD: 1.1 CM (ref 0.6–0.9)
ECHO LV IVSS: 1.3 CM
ECHO LV MASS 2D: 99.7 G (ref 67–162)
ECHO LV MASS INDEX 2D: 50.9 G/M2 (ref 43–95)
ECHO LV POSTERIOR WALL DIASTOLIC: 1.1 CM (ref 0.6–0.9)
ECHO LV POSTERIOR WALL SYSTOLIC: 1.3 CM
ECHO LV RELATIVE WALL THICKNESS RATIO: 0.71
ECHO LVOT AREA: 3.1 CM2
ECHO LVOT AV VTI INDEX: 0.76
ECHO LVOT DIAM: 2 CM
ECHO LVOT MEAN GRADIENT: 2 MMHG
ECHO LVOT PEAK GRADIENT: 3 MMHG
ECHO LVOT PEAK VELOCITY: 0.8 M/S
ECHO LVOT STROKE VOLUME INDEX: 22.9 ML/M2
ECHO LVOT SV: 44.9 ML
ECHO LVOT VTI: 14.3 CM
ECHO MV AREA PHT: 2.5 CM2
ECHO MV AREA VTI: 1.6 CM2
ECHO MV E DECELERATION TIME (DT): 245.9 MS
ECHO MV LVOT VTI INDEX: 2
ECHO MV MAX VELOCITY: 1.4 M/S
ECHO MV MEAN GRADIENT: 3 MMHG
ECHO MV MEAN VELOCITY: 0.8 M/S
ECHO MV PEAK GRADIENT: 8 MMHG
ECHO MV PRESSURE HALF TIME (PHT): 87.8 MS
ECHO MV VTI: 28.6 CM
ECHO RIGHT VENTRICULAR SYSTOLIC PRESSURE (RVSP): 47 MMHG
ECHO RV FREE WALL PEAK S': 8 CM/S
ECHO RV INTERNAL DIMENSION: 4 CM
ECHO RV TAPSE: 1.2 CM (ref 1.7–?)
ECHO TV REGURGITANT MAX VELOCITY: 3.13 M/S
ECHO TV REGURGITANT PEAK GRADIENT: 39 MMHG
EKG ATRIAL RATE: 129 BPM
EKG Q-T INTERVAL: 372 MS
EKG QRS DURATION: 94 MS
EKG QTC CALCULATION (BAZETT): 518 MS
EKG R AXIS: -51 DEGREES
EKG T AXIS: 246 DEGREES
EKG VENTRICULAR RATE: 117 BPM
EOSINOPHIL # BLD: 0.01 K/UL (ref 0.05–0.5)
EOSINOPHILS RELATIVE PERCENT: 0 % (ref 0–6)
ERYTHROCYTE [DISTWIDTH] IN BLOOD BY AUTOMATED COUNT: 14.6 % (ref 11.5–15)
GFR, ESTIMATED: 51 ML/MIN/1.73M2
GLUCOSE BLD-MCNC: 114 MG/DL (ref 74–99)
GLUCOSE BLD-MCNC: 132 MG/DL (ref 74–99)
GLUCOSE BLD-MCNC: 146 MG/DL (ref 74–99)
GLUCOSE BLD-MCNC: 179 MG/DL (ref 74–99)
GLUCOSE SERPL-MCNC: 151 MG/DL (ref 74–99)
HBA1C MFR BLD: 7.3 % (ref 4–5.6)
HCT VFR BLD AUTO: 34.5 % (ref 34–48)
HDLC SERPL-MCNC: 23 MG/DL
HGB BLD-MCNC: 10.9 G/DL (ref 11.5–15.5)
IMM GRANULOCYTES # BLD AUTO: 0.15 K/UL (ref 0–0.58)
IMM GRANULOCYTES NFR BLD: 1 % (ref 0–5)
LDLC SERPL CALC-MCNC: 26 MG/DL
LYMPHOCYTES NFR BLD: 1.37 K/UL (ref 1.5–4)
LYMPHOCYTES RELATIVE PERCENT: 8 % (ref 20–42)
MAGNESIUM SERPL-MCNC: 1.5 MG/DL (ref 1.6–2.4)
MCH RBC QN AUTO: 31.6 PG (ref 26–35)
MCHC RBC AUTO-ENTMCNC: 31.6 G/DL (ref 32–34.5)
MCV RBC AUTO: 100 FL (ref 80–99.9)
MONOCYTES NFR BLD: 1.06 K/UL (ref 0.1–0.95)
MONOCYTES NFR BLD: 6 % (ref 2–12)
NEUTROPHILS NFR BLD: 84 % (ref 43–80)
NEUTS SEG NFR BLD: 14.13 K/UL (ref 1.8–7.3)
PLATELET # BLD AUTO: 224 K/UL (ref 130–450)
PMV BLD AUTO: 10.6 FL (ref 7–12)
POTASSIUM SERPL-SCNC: 3.7 MMOL/L (ref 3.5–5.1)
PROT SERPL-MCNC: 5.5 G/DL (ref 6.4–8.3)
RBC # BLD AUTO: 3.45 M/UL (ref 3.5–5.5)
SODIUM SERPL-SCNC: 138 MMOL/L (ref 136–145)
T4 FREE SERPL-MCNC: 1.1 NG/DL (ref 0.9–1.7)
TRIGL SERPL-MCNC: 73 MG/DL
TROPONIN I SERPL HS-MCNC: 65 NG/L (ref 0–14)
TSH SERPL DL<=0.05 MIU/L-ACNC: 0.78 UIU/ML (ref 0.27–4.2)
VLDLC SERPL CALC-MCNC: 15 MG/DL
WBC OTHER # BLD: 16.8 K/UL (ref 4.5–11.5)

## 2025-08-07 PROCEDURE — 6360000002 HC RX W HCPCS: Performed by: INTERNAL MEDICINE

## 2025-08-07 PROCEDURE — 2060000000 HC ICU INTERMEDIATE R&B

## 2025-08-07 PROCEDURE — 97161 PT EVAL LOW COMPLEX 20 MIN: CPT

## 2025-08-07 PROCEDURE — 82962 GLUCOSE BLOOD TEST: CPT

## 2025-08-07 PROCEDURE — 85025 COMPLETE CBC W/AUTO DIFF WBC: CPT

## 2025-08-07 PROCEDURE — 84484 ASSAY OF TROPONIN QUANT: CPT

## 2025-08-07 PROCEDURE — 93306 TTE W/DOPPLER COMPLETE: CPT | Performed by: INTERNAL MEDICINE

## 2025-08-07 PROCEDURE — 6360000004 HC RX CONTRAST MEDICATION

## 2025-08-07 PROCEDURE — 2500000003 HC RX 250 WO HCPCS: Performed by: INTERNAL MEDICINE

## 2025-08-07 PROCEDURE — APPSS60 APP SPLIT SHARED TIME 46-60 MINUTES

## 2025-08-07 PROCEDURE — 80053 COMPREHEN METABOLIC PANEL: CPT

## 2025-08-07 PROCEDURE — 83735 ASSAY OF MAGNESIUM: CPT

## 2025-08-07 PROCEDURE — 6360000002 HC RX W HCPCS

## 2025-08-07 PROCEDURE — 99223 1ST HOSP IP/OBS HIGH 75: CPT | Performed by: INTERNAL MEDICINE

## 2025-08-07 PROCEDURE — 80061 LIPID PANEL: CPT

## 2025-08-07 PROCEDURE — 36415 COLL VENOUS BLD VENIPUNCTURE: CPT

## 2025-08-07 PROCEDURE — 93010 ELECTROCARDIOGRAM REPORT: CPT | Performed by: INTERNAL MEDICINE

## 2025-08-07 PROCEDURE — 83036 HEMOGLOBIN GLYCOSYLATED A1C: CPT

## 2025-08-07 PROCEDURE — 6370000000 HC RX 637 (ALT 250 FOR IP): Performed by: INTERNAL MEDICINE

## 2025-08-07 PROCEDURE — 97165 OT EVAL LOW COMPLEX 30 MIN: CPT

## 2025-08-07 PROCEDURE — 84443 ASSAY THYROID STIM HORMONE: CPT

## 2025-08-07 PROCEDURE — 84439 ASSAY OF FREE THYROXINE: CPT

## 2025-08-07 PROCEDURE — C8929 TTE W OR WO FOL WCON,DOPPLER: HCPCS

## 2025-08-07 RX ORDER — INSULIN GLARGINE 100 [IU]/ML
5 INJECTION, SOLUTION SUBCUTANEOUS DAILY
Status: DISCONTINUED | OUTPATIENT
Start: 2025-08-07 | End: 2025-08-10 | Stop reason: HOSPADM

## 2025-08-07 RX ORDER — DIGOXIN 0.25 MG/ML
250 INJECTION INTRAMUSCULAR; INTRAVENOUS EVERY 6 HOURS
Status: COMPLETED | OUTPATIENT
Start: 2025-08-07 | End: 2025-08-07

## 2025-08-07 RX ADMIN — ATORVASTATIN CALCIUM 20 MG: 20 TABLET, FILM COATED ORAL at 10:42

## 2025-08-07 RX ADMIN — APIXABAN 5 MG: 5 TABLET, FILM COATED ORAL at 21:03

## 2025-08-07 RX ADMIN — INSULIN GLARGINE 5 UNITS: 100 INJECTION, SOLUTION SUBCUTANEOUS at 16:29

## 2025-08-07 RX ADMIN — DIGOXIN 250 MCG: 0.25 INJECTION INTRAMUSCULAR; INTRAVENOUS at 10:42

## 2025-08-07 RX ADMIN — APIXABAN 5 MG: 5 TABLET, FILM COATED ORAL at 10:42

## 2025-08-07 RX ADMIN — SULFUR HEXAFLUORIDE 2 ML: 60.7; .19; .19 INJECTION, POWDER, LYOPHILIZED, FOR SUSPENSION INTRAVENOUS; INTRAVESICAL at 15:03

## 2025-08-07 RX ADMIN — LEVOTHYROXINE SODIUM 137 MCG: 0.11 TABLET ORAL at 10:42

## 2025-08-07 RX ADMIN — PANTOPRAZOLE SODIUM 40 MG: 40 TABLET, DELAYED RELEASE ORAL at 06:27

## 2025-08-07 RX ADMIN — SODIUM CHLORIDE, PRESERVATIVE FREE 10 ML: 5 INJECTION INTRAVENOUS at 21:03

## 2025-08-07 RX ADMIN — DIGOXIN 250 MCG: 0.25 INJECTION INTRAMUSCULAR; INTRAVENOUS at 16:37

## 2025-08-07 RX ADMIN — WATER 2000 MG: 1 INJECTION INTRAMUSCULAR; INTRAVENOUS; SUBCUTANEOUS at 16:36

## 2025-08-07 RX ADMIN — METOPROLOL SUCCINATE 12.5 MG: 25 TABLET, EXTENDED RELEASE ORAL at 10:42

## 2025-08-07 RX ADMIN — BUPROPION HYDROCHLORIDE 150 MG: 150 TABLET, EXTENDED RELEASE ORAL at 10:42

## 2025-08-08 ENCOUNTER — APPOINTMENT (OUTPATIENT)
Dept: GENERAL RADIOLOGY | Age: 89
DRG: 872 | End: 2025-08-08
Payer: MEDICARE

## 2025-08-08 LAB
ALBUMIN SERPL-MCNC: 2.7 G/DL (ref 3.5–5.2)
ALP SERPL-CCNC: 51 U/L (ref 35–104)
ALT SERPL-CCNC: 15 U/L (ref 0–35)
ANION GAP SERPL CALCULATED.3IONS-SCNC: 11 MMOL/L (ref 7–16)
AST SERPL-CCNC: 27 U/L (ref 0–35)
BASOPHILS # BLD: 0.04 K/UL (ref 0–0.2)
BASOPHILS NFR BLD: 0 % (ref 0–2)
BILIRUB SERPL-MCNC: 0.6 MG/DL (ref 0–1.2)
BUN SERPL-MCNC: 22 MG/DL (ref 8–23)
CALCIUM SERPL-MCNC: 8.2 MG/DL (ref 8.8–10.2)
CHLORIDE SERPL-SCNC: 110 MMOL/L (ref 98–107)
CO2 SERPL-SCNC: 15 MMOL/L (ref 22–29)
CREAT SERPL-MCNC: 1.2 MG/DL (ref 0.5–1)
EOSINOPHIL # BLD: 0.16 K/UL (ref 0.05–0.5)
EOSINOPHILS RELATIVE PERCENT: 2 % (ref 0–6)
ERYTHROCYTE [DISTWIDTH] IN BLOOD BY AUTOMATED COUNT: 14.5 % (ref 11.5–15)
GFR, ESTIMATED: 44 ML/MIN/1.73M2
GLUCOSE BLD-MCNC: 132 MG/DL (ref 74–99)
GLUCOSE BLD-MCNC: 149 MG/DL (ref 74–99)
GLUCOSE SERPL-MCNC: 115 MG/DL (ref 74–99)
HCT VFR BLD AUTO: 33.1 % (ref 34–48)
HGB BLD-MCNC: 10.1 G/DL (ref 11.5–15.5)
IMM GRANULOCYTES # BLD AUTO: 0.06 K/UL (ref 0–0.58)
IMM GRANULOCYTES NFR BLD: 1 % (ref 0–5)
LYMPHOCYTES NFR BLD: 1.11 K/UL (ref 1.5–4)
LYMPHOCYTES RELATIVE PERCENT: 11 % (ref 20–42)
MCH RBC QN AUTO: 31 PG (ref 26–35)
MCHC RBC AUTO-ENTMCNC: 30.5 G/DL (ref 32–34.5)
MCV RBC AUTO: 101.5 FL (ref 80–99.9)
MICROORGANISM SPEC CULT: ABNORMAL
MONOCYTES NFR BLD: 0.88 K/UL (ref 0.1–0.95)
MONOCYTES NFR BLD: 9 % (ref 2–12)
NEUTROPHILS NFR BLD: 78 % (ref 43–80)
NEUTS SEG NFR BLD: 7.72 K/UL (ref 1.8–7.3)
PLATELET # BLD AUTO: 196 K/UL (ref 130–450)
PMV BLD AUTO: 10.3 FL (ref 7–12)
POTASSIUM SERPL-SCNC: 3.7 MMOL/L (ref 3.5–5.1)
PROT SERPL-MCNC: 5.2 G/DL (ref 6.4–8.3)
RBC # BLD AUTO: 3.26 M/UL (ref 3.5–5.5)
SERVICE CMNT-IMP: ABNORMAL
SODIUM SERPL-SCNC: 136 MMOL/L (ref 136–145)
SPECIMEN DESCRIPTION: ABNORMAL
WBC OTHER # BLD: 10 K/UL (ref 4.5–11.5)

## 2025-08-08 PROCEDURE — 71045 X-RAY EXAM CHEST 1 VIEW: CPT

## 2025-08-08 PROCEDURE — 85025 COMPLETE CBC W/AUTO DIFF WBC: CPT

## 2025-08-08 PROCEDURE — 6360000002 HC RX W HCPCS: Performed by: INTERNAL MEDICINE

## 2025-08-08 PROCEDURE — 80053 COMPREHEN METABOLIC PANEL: CPT

## 2025-08-08 PROCEDURE — 99233 SBSQ HOSP IP/OBS HIGH 50: CPT | Performed by: INTERNAL MEDICINE

## 2025-08-08 PROCEDURE — 6370000000 HC RX 637 (ALT 250 FOR IP): Performed by: INTERNAL MEDICINE

## 2025-08-08 PROCEDURE — 2060000000 HC ICU INTERMEDIATE R&B

## 2025-08-08 PROCEDURE — 82962 GLUCOSE BLOOD TEST: CPT

## 2025-08-08 PROCEDURE — 2700000000 HC OXYGEN THERAPY PER DAY

## 2025-08-08 PROCEDURE — 2500000003 HC RX 250 WO HCPCS: Performed by: INTERNAL MEDICINE

## 2025-08-08 RX ORDER — METOPROLOL SUCCINATE 25 MG/1
25 TABLET, EXTENDED RELEASE ORAL 2 TIMES DAILY
Status: DISCONTINUED | OUTPATIENT
Start: 2025-08-09 | End: 2025-08-10 | Stop reason: HOSPADM

## 2025-08-08 RX ORDER — MIDODRINE HYDROCHLORIDE 5 MG/1
5 TABLET ORAL
Status: DISCONTINUED | OUTPATIENT
Start: 2025-08-09 | End: 2025-08-10 | Stop reason: HOSPADM

## 2025-08-08 RX ORDER — SODIUM BICARBONATE 650 MG/1
650 TABLET ORAL 2 TIMES DAILY
Status: DISCONTINUED | OUTPATIENT
Start: 2025-08-08 | End: 2025-08-10 | Stop reason: HOSPADM

## 2025-08-08 RX ADMIN — BUPROPION HYDROCHLORIDE 150 MG: 150 TABLET, EXTENDED RELEASE ORAL at 11:10

## 2025-08-08 RX ADMIN — LEVOTHYROXINE SODIUM 137 MCG: 0.11 TABLET ORAL at 11:10

## 2025-08-08 RX ADMIN — SODIUM BICARBONATE 650 MG: 650 TABLET ORAL at 21:56

## 2025-08-08 RX ADMIN — APIXABAN 5 MG: 5 TABLET, FILM COATED ORAL at 21:56

## 2025-08-08 RX ADMIN — INSULIN GLARGINE 5 UNITS: 100 INJECTION, SOLUTION SUBCUTANEOUS at 11:11

## 2025-08-08 RX ADMIN — WATER 2000 MG: 1 INJECTION INTRAMUSCULAR; INTRAVENOUS; SUBCUTANEOUS at 15:57

## 2025-08-08 RX ADMIN — METOPROLOL SUCCINATE 12.5 MG: 25 TABLET, EXTENDED RELEASE ORAL at 11:10

## 2025-08-08 RX ADMIN — SODIUM BICARBONATE 650 MG: 650 TABLET ORAL at 11:11

## 2025-08-08 RX ADMIN — ATORVASTATIN CALCIUM 20 MG: 20 TABLET, FILM COATED ORAL at 11:11

## 2025-08-08 RX ADMIN — PANTOPRAZOLE SODIUM 40 MG: 40 TABLET, DELAYED RELEASE ORAL at 06:37

## 2025-08-08 RX ADMIN — APIXABAN 5 MG: 5 TABLET, FILM COATED ORAL at 11:11

## 2025-08-09 LAB
ALBUMIN SERPL-MCNC: 2.7 G/DL (ref 3.5–5.2)
ALP SERPL-CCNC: 80 U/L (ref 35–104)
ALT SERPL-CCNC: 53 U/L (ref 0–35)
ANION GAP SERPL CALCULATED.3IONS-SCNC: 13 MMOL/L (ref 7–16)
AST SERPL-CCNC: 74 U/L (ref 0–35)
BASOPHILS # BLD: 0.03 K/UL (ref 0–0.2)
BASOPHILS NFR BLD: 0 % (ref 0–2)
BILIRUB SERPL-MCNC: 0.5 MG/DL (ref 0–1.2)
BUN SERPL-MCNC: 21 MG/DL (ref 8–23)
CALCIUM SERPL-MCNC: 8.6 MG/DL (ref 8.8–10.2)
CHLORIDE SERPL-SCNC: 111 MMOL/L (ref 98–107)
CO2 SERPL-SCNC: 15 MMOL/L (ref 22–29)
CREAT SERPL-MCNC: 1.1 MG/DL (ref 0.5–1)
EOSINOPHIL # BLD: 0.3 K/UL (ref 0.05–0.5)
EOSINOPHILS RELATIVE PERCENT: 4 % (ref 0–6)
ERYTHROCYTE [DISTWIDTH] IN BLOOD BY AUTOMATED COUNT: 14.4 % (ref 11.5–15)
GFR, ESTIMATED: 48 ML/MIN/1.73M2
GLUCOSE BLD-MCNC: 155 MG/DL (ref 74–99)
GLUCOSE BLD-MCNC: 164 MG/DL (ref 74–99)
GLUCOSE BLD-MCNC: 167 MG/DL (ref 74–99)
GLUCOSE SERPL-MCNC: 115 MG/DL (ref 74–99)
HCT VFR BLD AUTO: 32.3 % (ref 34–48)
HGB BLD-MCNC: 10.3 G/DL (ref 11.5–15.5)
IMM GRANULOCYTES # BLD AUTO: 0.05 K/UL (ref 0–0.58)
IMM GRANULOCYTES NFR BLD: 1 % (ref 0–5)
LYMPHOCYTES NFR BLD: 1.05 K/UL (ref 1.5–4)
LYMPHOCYTES RELATIVE PERCENT: 16 % (ref 20–42)
MCH RBC QN AUTO: 31.2 PG (ref 26–35)
MCHC RBC AUTO-ENTMCNC: 31.9 G/DL (ref 32–34.5)
MCV RBC AUTO: 97.9 FL (ref 80–99.9)
MONOCYTES NFR BLD: 0.73 K/UL (ref 0.1–0.95)
MONOCYTES NFR BLD: 11 % (ref 2–12)
NEUTROPHILS NFR BLD: 68 % (ref 43–80)
NEUTS SEG NFR BLD: 4.61 K/UL (ref 1.8–7.3)
PLATELET # BLD AUTO: 212 K/UL (ref 130–450)
PMV BLD AUTO: 10.5 FL (ref 7–12)
POTASSIUM SERPL-SCNC: 3.8 MMOL/L (ref 3.5–5.1)
PROT SERPL-MCNC: 5.3 G/DL (ref 6.4–8.3)
RBC # BLD AUTO: 3.3 M/UL (ref 3.5–5.5)
SODIUM SERPL-SCNC: 139 MMOL/L (ref 136–145)
WBC OTHER # BLD: 6.8 K/UL (ref 4.5–11.5)

## 2025-08-09 PROCEDURE — 80053 COMPREHEN METABOLIC PANEL: CPT

## 2025-08-09 PROCEDURE — 99232 SBSQ HOSP IP/OBS MODERATE 35: CPT | Performed by: INTERNAL MEDICINE

## 2025-08-09 PROCEDURE — 2700000000 HC OXYGEN THERAPY PER DAY

## 2025-08-09 PROCEDURE — 6370000000 HC RX 637 (ALT 250 FOR IP): Performed by: INTERNAL MEDICINE

## 2025-08-09 PROCEDURE — 85025 COMPLETE CBC W/AUTO DIFF WBC: CPT

## 2025-08-09 PROCEDURE — 2060000000 HC ICU INTERMEDIATE R&B

## 2025-08-09 PROCEDURE — 82962 GLUCOSE BLOOD TEST: CPT

## 2025-08-09 PROCEDURE — 2500000003 HC RX 250 WO HCPCS: Performed by: INTERNAL MEDICINE

## 2025-08-09 RX ORDER — CIPROFLOXACIN 250 MG/1
250 TABLET, FILM COATED ORAL EVERY 12 HOURS SCHEDULED
Status: DISCONTINUED | OUTPATIENT
Start: 2025-08-09 | End: 2025-08-10 | Stop reason: HOSPADM

## 2025-08-09 RX ADMIN — SODIUM CHLORIDE, PRESERVATIVE FREE 10 ML: 5 INJECTION INTRAVENOUS at 21:08

## 2025-08-09 RX ADMIN — LEVOTHYROXINE SODIUM 137 MCG: 0.11 TABLET ORAL at 08:29

## 2025-08-09 RX ADMIN — MIDODRINE HYDROCHLORIDE 5 MG: 5 TABLET ORAL at 16:25

## 2025-08-09 RX ADMIN — SODIUM BICARBONATE 650 MG: 650 TABLET ORAL at 21:08

## 2025-08-09 RX ADMIN — APIXABAN 5 MG: 5 TABLET, FILM COATED ORAL at 21:08

## 2025-08-09 RX ADMIN — METOPROLOL SUCCINATE 25 MG: 25 TABLET, EXTENDED RELEASE ORAL at 08:28

## 2025-08-09 RX ADMIN — APIXABAN 5 MG: 5 TABLET, FILM COATED ORAL at 08:28

## 2025-08-09 RX ADMIN — MIDODRINE HYDROCHLORIDE 5 MG: 5 TABLET ORAL at 12:37

## 2025-08-09 RX ADMIN — BUPROPION HYDROCHLORIDE 150 MG: 150 TABLET, EXTENDED RELEASE ORAL at 08:28

## 2025-08-09 RX ADMIN — MIDODRINE HYDROCHLORIDE 5 MG: 5 TABLET ORAL at 08:28

## 2025-08-09 RX ADMIN — SODIUM BICARBONATE 650 MG: 650 TABLET ORAL at 08:28

## 2025-08-09 RX ADMIN — CIPROFLOXACIN HYDROCHLORIDE 250 MG: 250 TABLET, FILM COATED ORAL at 21:08

## 2025-08-09 RX ADMIN — PANTOPRAZOLE SODIUM 40 MG: 40 TABLET, DELAYED RELEASE ORAL at 08:29

## 2025-08-09 RX ADMIN — SODIUM CHLORIDE, PRESERVATIVE FREE 10 ML: 5 INJECTION INTRAVENOUS at 08:29

## 2025-08-09 RX ADMIN — METOPROLOL SUCCINATE 25 MG: 25 TABLET, EXTENDED RELEASE ORAL at 21:07

## 2025-08-09 RX ADMIN — ATORVASTATIN CALCIUM 20 MG: 20 TABLET, FILM COATED ORAL at 08:28

## 2025-08-09 RX ADMIN — INSULIN GLARGINE 5 UNITS: 100 INJECTION, SOLUTION SUBCUTANEOUS at 08:28

## 2025-08-09 ASSESSMENT — PAIN SCALES - GENERAL: PAINLEVEL_OUTOF10: 0

## 2025-08-10 VITALS
RESPIRATION RATE: 17 BRPM | SYSTOLIC BLOOD PRESSURE: 122 MMHG | DIASTOLIC BLOOD PRESSURE: 60 MMHG | WEIGHT: 223.33 LBS | HEART RATE: 71 BPM | BODY MASS INDEX: 41.1 KG/M2 | TEMPERATURE: 97.5 F | HEIGHT: 62 IN | OXYGEN SATURATION: 94 %

## 2025-08-10 LAB
ALBUMIN SERPL-MCNC: 3 G/DL (ref 3.5–5.2)
ALP SERPL-CCNC: 125 U/L (ref 35–104)
ALT SERPL-CCNC: 68 U/L (ref 0–35)
ANION GAP SERPL CALCULATED.3IONS-SCNC: 11 MMOL/L (ref 7–16)
AST SERPL-CCNC: 77 U/L (ref 0–35)
BASOPHILS # BLD: 0.04 K/UL (ref 0–0.2)
BASOPHILS NFR BLD: 1 % (ref 0–2)
BILIRUB SERPL-MCNC: 0.5 MG/DL (ref 0–1.2)
BUN SERPL-MCNC: 21 MG/DL (ref 8–23)
CALCIUM SERPL-MCNC: 8.9 MG/DL (ref 8.8–10.2)
CHLORIDE SERPL-SCNC: 111 MMOL/L (ref 98–107)
CO2 SERPL-SCNC: 19 MMOL/L (ref 22–29)
CREAT SERPL-MCNC: 1.2 MG/DL (ref 0.5–1)
EOSINOPHIL # BLD: 0.23 K/UL (ref 0.05–0.5)
EOSINOPHILS RELATIVE PERCENT: 3 % (ref 0–6)
ERYTHROCYTE [DISTWIDTH] IN BLOOD BY AUTOMATED COUNT: 14.6 % (ref 11.5–15)
GFR, ESTIMATED: 44 ML/MIN/1.73M2
GLUCOSE BLD-MCNC: 186 MG/DL (ref 74–99)
GLUCOSE SERPL-MCNC: 154 MG/DL (ref 74–99)
HCT VFR BLD AUTO: 35.2 % (ref 34–48)
HGB BLD-MCNC: 11.2 G/DL (ref 11.5–15.5)
IMM GRANULOCYTES # BLD AUTO: 0.07 K/UL (ref 0–0.58)
IMM GRANULOCYTES NFR BLD: 1 % (ref 0–5)
LYMPHOCYTES NFR BLD: 1.16 K/UL (ref 1.5–4)
LYMPHOCYTES RELATIVE PERCENT: 17 % (ref 20–42)
MCH RBC QN AUTO: 31 PG (ref 26–35)
MCHC RBC AUTO-ENTMCNC: 31.8 G/DL (ref 32–34.5)
MCV RBC AUTO: 97.5 FL (ref 80–99.9)
MONOCYTES NFR BLD: 0.72 K/UL (ref 0.1–0.95)
MONOCYTES NFR BLD: 11 % (ref 2–12)
NEUTROPHILS NFR BLD: 67 % (ref 43–80)
NEUTS SEG NFR BLD: 4.59 K/UL (ref 1.8–7.3)
PLATELET # BLD AUTO: 257 K/UL (ref 130–450)
PMV BLD AUTO: 10.3 FL (ref 7–12)
POTASSIUM SERPL-SCNC: 3.9 MMOL/L (ref 3.5–5.1)
PROT SERPL-MCNC: 5.7 G/DL (ref 6.4–8.3)
RBC # BLD AUTO: 3.61 M/UL (ref 3.5–5.5)
SODIUM SERPL-SCNC: 141 MMOL/L (ref 136–145)
WBC OTHER # BLD: 6.8 K/UL (ref 4.5–11.5)

## 2025-08-10 PROCEDURE — 80053 COMPREHEN METABOLIC PANEL: CPT

## 2025-08-10 PROCEDURE — 85025 COMPLETE CBC W/AUTO DIFF WBC: CPT

## 2025-08-10 PROCEDURE — 82962 GLUCOSE BLOOD TEST: CPT

## 2025-08-10 PROCEDURE — 6370000000 HC RX 637 (ALT 250 FOR IP): Performed by: INTERNAL MEDICINE

## 2025-08-10 PROCEDURE — 2500000003 HC RX 250 WO HCPCS: Performed by: INTERNAL MEDICINE

## 2025-08-10 RX ORDER — METOPROLOL SUCCINATE 25 MG/1
25 TABLET, EXTENDED RELEASE ORAL 2 TIMES DAILY
Qty: 30 TABLET | Refills: 3 | Status: SHIPPED | OUTPATIENT
Start: 2025-08-10

## 2025-08-10 RX ORDER — SODIUM BICARBONATE 650 MG/1
650 TABLET ORAL 2 TIMES DAILY
Qty: 60 TABLET | Refills: 0 | Status: SHIPPED | OUTPATIENT
Start: 2025-08-10

## 2025-08-10 RX ORDER — MIDODRINE HYDROCHLORIDE 5 MG/1
5 TABLET ORAL
Qty: 90 TABLET | Refills: 3 | Status: SHIPPED | OUTPATIENT
Start: 2025-08-10

## 2025-08-10 RX ORDER — CIPROFLOXACIN 250 MG/1
250 TABLET, FILM COATED ORAL EVERY 12 HOURS SCHEDULED
Qty: 8 TABLET | Refills: 0 | Status: SHIPPED | OUTPATIENT
Start: 2025-08-10 | End: 2025-08-14

## 2025-08-10 RX ADMIN — MIDODRINE HYDROCHLORIDE 5 MG: 5 TABLET ORAL at 08:21

## 2025-08-10 RX ADMIN — INSULIN GLARGINE 5 UNITS: 100 INJECTION, SOLUTION SUBCUTANEOUS at 08:31

## 2025-08-10 RX ADMIN — SODIUM CHLORIDE, PRESERVATIVE FREE 10 ML: 5 INJECTION INTRAVENOUS at 08:22

## 2025-08-10 RX ADMIN — ATORVASTATIN CALCIUM 20 MG: 20 TABLET, FILM COATED ORAL at 08:21

## 2025-08-10 RX ADMIN — LEVOTHYROXINE SODIUM 137 MCG: 0.11 TABLET ORAL at 08:21

## 2025-08-10 RX ADMIN — APIXABAN 5 MG: 5 TABLET, FILM COATED ORAL at 08:21

## 2025-08-10 RX ADMIN — PANTOPRAZOLE SODIUM 40 MG: 40 TABLET, DELAYED RELEASE ORAL at 06:15

## 2025-08-10 RX ADMIN — CIPROFLOXACIN HYDROCHLORIDE 250 MG: 250 TABLET, FILM COATED ORAL at 08:21

## 2025-08-10 RX ADMIN — BUPROPION HYDROCHLORIDE 150 MG: 150 TABLET, EXTENDED RELEASE ORAL at 08:21

## 2025-08-10 RX ADMIN — SODIUM BICARBONATE 650 MG: 650 TABLET ORAL at 08:21

## 2025-08-10 RX ADMIN — MIDODRINE HYDROCHLORIDE 5 MG: 5 TABLET ORAL at 11:09

## 2025-08-10 RX ADMIN — METOPROLOL SUCCINATE 25 MG: 25 TABLET, EXTENDED RELEASE ORAL at 08:21

## 2025-08-10 ASSESSMENT — PAIN SCALES - GENERAL: PAINLEVEL_OUTOF10: 0

## 2025-08-11 LAB
MICROORGANISM SPEC CULT: NORMAL
MICROORGANISM SPEC CULT: NORMAL
SERVICE CMNT-IMP: NORMAL
SERVICE CMNT-IMP: NORMAL
SPECIMEN DESCRIPTION: NORMAL
SPECIMEN DESCRIPTION: NORMAL

## (undated) DEVICE — GOWN,SIRUS,POLYRNF,BRTHSLV,XL,30/CS: Brand: MEDLINE

## (undated) DEVICE — TUBE IRRIG HNDPC HI FLO TP INTRPULS W/SUCTION TUBE

## (undated) DEVICE — STRIP,CLOSURE,WOUND,MEDI-STRIP,1/2X4: Brand: MEDLINE

## (undated) DEVICE — INSTRUMENT CLAMP GALLBLADDER REUSABLE

## (undated) DEVICE — GLOVE SURG SZ 8 L12IN FNGR THK94MIL TRNSLUC YEL LTX HYDRGEL

## (undated) DEVICE — Device: Brand: POWER-FLO®

## (undated) DEVICE — SUTURE STRATAFIX SYMMETRIC PDS + SZ 1 L18IN ABSRB VLT OS-6 SXPP1A201

## (undated) DEVICE — DECANTER: Brand: UNBRANDED

## (undated) DEVICE — CHLORAPREP 26ML ORANGE

## (undated) DEVICE — SYRINGE MED 50ML LUERLOCK TIP

## (undated) DEVICE — 3M™ IOBAN™ 2 ANTIMICROBIAL INCISE DRAPE 6650EZ: Brand: IOBAN™ 2

## (undated) DEVICE — NEEDLE HYPO 25GA L1.5IN BLU POLYPR HUB S STL REG BVL STR

## (undated) DEVICE — NEEDLE HYPO 22GA L1.5IN BLK POLYPR HUB S STL REG BVL STR

## (undated) DEVICE — TOWEL,OR,DSP,ST,BLUE,STD,6/PK,12PK/CS: Brand: MEDLINE

## (undated) DEVICE — 3M™ COBAN™ NL STERILE NON-LATEX SELF-ADHERENT WRAP, 2084S, 4 IN X 5 YD (10 CM X 4,5 M), 18 ROLLS/CASE: Brand: 3M™ COBAN™

## (undated) DEVICE — INSTRUMENT CLAMP TOWEL LARGE REUSABLE

## (undated) DEVICE — FORCEPS RAT TOOTH 1X2

## (undated) DEVICE — SYRINGE 20ML LL S/C 50

## (undated) DEVICE — CONVERTORS STOCKINETTE: Brand: CONVERTORS

## (undated) DEVICE — GAUZE,SPONGE,4"X4",8PLY,STRL,LF,10/TRAY: Brand: MEDLINE

## (undated) DEVICE — Z DISCONTINUED PER MEDLINE USE 2741943 DRESSING AQUACEL 10 IN ALG W9XL25CM SIL CVR WTRPRF VIR BACT BARR ANTIMIC

## (undated) DEVICE — INSTRUMENT ZIMMER HIP LOW PROFILE REAMER HANDLE REUSABLE

## (undated) DEVICE — TRAY LARGE DRILL REUSABLE

## (undated) DEVICE — ELECTRODE PT RET AD L9FT HI MOIST COND ADH HYDRGEL CORDED

## (undated) DEVICE — SOLUTION IV IRRIG POUR BRL 0.9% SODIUM CHL 2F7124

## (undated) DEVICE — SYRINGE, LUER LOCK, 10ML: Brand: MEDLINE

## (undated) DEVICE — SYRINGE MED 10ML POLYPR LUERSLIP TIP FLAT TOP W/O SFTY DISP

## (undated) DEVICE — GOWN,SIRUS,FABRNF,2XL,18/CS: Brand: MEDLINE

## (undated) DEVICE — TRAY LAMBOTS REUSABLE

## (undated) DEVICE — CUP MEDICINE ST

## (undated) DEVICE — TRAY HIP EXTRAS REUSABLE

## (undated) DEVICE — INSTRUMENT LARGE BATTERY REUSABLE

## (undated) DEVICE — DOUBLE BASIN SET: Brand: MEDLINE INDUSTRIES, INC.

## (undated) DEVICE — INSTRUMENT RETRACTOR WEITLANER CURVED PP REUSABLE

## (undated) DEVICE — Device

## (undated) DEVICE — 3M™ STERI-DRAPE™ U-DRAPE 1015: Brand: STERI-DRAPE™

## (undated) DEVICE — INSTRUMENT CURRETTES REUSABLE

## (undated) DEVICE — TUBING, SUCTION, 9/32" X 10', STRAIGHT: Brand: MEDLINE

## (undated) DEVICE — KIT SURG W7XL11IN 2 PKT UNTREATED NA

## (undated) DEVICE — NEEDLE FLTR 18GA L1.5IN MEM THK5UM BLNT DISP

## (undated) DEVICE — TOTAL HIP PK

## (undated) DEVICE — PIN DISPNS IV ADD FOR RUB STOPPERED MD VI REUSE MINI-SPIKE

## (undated) DEVICE — DRAPE,TOP,102X53,STERILE: Brand: MEDLINE

## (undated) DEVICE — DISPOSABLE FEMORAL CEMENT                                    COMPRESSOR CAP STERILE

## (undated) DEVICE — PACK PROCEDURE SURG GEN CUST

## (undated) DEVICE — GLOVE SURG SZ 8 CRM LTX FREE POLYISOPRENE POLYMER BEAD ANTI

## (undated) DEVICE — SOLUTION IV 1000ML 0.9% SOD CHL PH 5 INJ USP VIAFLX PLAS

## (undated) DEVICE — NEEDLE SPNL 22GA L3.5IN BLK HUB S STL REG WALL FIT STYL W/

## (undated) DEVICE — SOLUTION IV 100ML 0.9% SOD CHL PLAS CONT USP VIAFLX 1 PER

## (undated) DEVICE — SPONGE LAP W18XL18IN WHT COT 4 PLY FLD STRUNG RADPQ DISP ST

## (undated) DEVICE — PACKING,VAGINAL,XR,ST,4"X36",100EA: Brand: MEDLINE

## (undated) DEVICE — COVER HNDL LT DISP

## (undated) DEVICE — COAXIAL HIGH FLOW TIP WITH SOFT SHIELD

## (undated) DEVICE — MARKER,SKIN,WI/RULER AND LABELS: Brand: MEDLINE

## (undated) DEVICE — NEEDLE SPNL L3.5IN PNK HUB S STL REG WALL FIT STYL W/ QNCKE

## (undated) DEVICE — SOLUTION IRRIG 2000ML 0.9% SOD CHL USP UROMATIC PLAS CONT

## (undated) DEVICE — INSTRUMENT SYSTEM 7 BATTERY REUSABLE

## (undated) DEVICE — 1000 S-DRAPE TOWEL DRAPE 10/BX: Brand: STERI-DRAPE™

## (undated) DEVICE — PILLOW POS W15XH6XL22IN RASPBERRY FOAM ABD W/ STRP DISP FOR

## (undated) DEVICE — DRAPE,REIN 53X77,STERILE: Brand: MEDLINE

## (undated) DEVICE — SYRINGE, LUER LOCK, 5ML: Brand: MEDLINE

## (undated) DEVICE — Y-TYPE TUR/BLADDER IRRIGATION SET, REGULATING CLAMP

## (undated) DEVICE — SET ORTHO STD STORTSTD1

## (undated) DEVICE — 4-PORT MANIFOLD: Brand: NEPTUNE 2

## (undated) DEVICE — Z DISCONTINUED USE 2275686 GLOVE SURG SZ 8 L12IN FNGR THK13MIL WHT ISOLEX POLYISOPRENE

## (undated) DEVICE — MEDIA CONTRAST ISOVUE  300 10X50ML

## (undated) DEVICE — TRAY  ZIMMER HIP POSTERIOR RETRACTOR REUSABLE

## (undated) DEVICE — BLADE SAW W11XL77.5MM THK1.23MM CUT THK1.17MM S STL RECIP

## (undated) DEVICE — BLADE ES L6IN ELASTOMERIC COAT EXT DURABLE BEND UPTO 90DEG

## (undated) DEVICE — STERILE PVP: Brand: MEDLINE INDUSTRIES, INC.

## (undated) DEVICE — BANDAGE ADH W0.75XL3IN UNIV WVN FAB NAT GEN USE STRP N ADH